# Patient Record
Sex: MALE | Race: WHITE | Employment: FULL TIME
[De-identification: names, ages, dates, MRNs, and addresses within clinical notes are randomized per-mention and may not be internally consistent; named-entity substitution may affect disease eponyms.]

---

## 2019-09-29 ENCOUNTER — HEALTH MAINTENANCE LETTER (OUTPATIENT)
Age: 59
End: 2019-09-29

## 2021-01-14 ENCOUNTER — HEALTH MAINTENANCE LETTER (OUTPATIENT)
Age: 61
End: 2021-01-14

## 2021-04-01 ENCOUNTER — TRANSFERRED RECORDS (OUTPATIENT)
Dept: HEALTH INFORMATION MANAGEMENT | Facility: CLINIC | Age: 61
End: 2021-04-01

## 2021-04-08 ENCOUNTER — OFFICE VISIT (OUTPATIENT)
Dept: FAMILY MEDICINE | Facility: CLINIC | Age: 61
End: 2021-04-08
Payer: COMMERCIAL

## 2021-04-08 ENCOUNTER — TELEPHONE (OUTPATIENT)
Dept: FAMILY MEDICINE | Facility: CLINIC | Age: 61
End: 2021-04-08

## 2021-04-08 VITALS
TEMPERATURE: 98.1 F | SYSTOLIC BLOOD PRESSURE: 131 MMHG | RESPIRATION RATE: 20 BRPM | WEIGHT: 285 LBS | HEART RATE: 60 BPM | HEIGHT: 75 IN | OXYGEN SATURATION: 96 % | BODY MASS INDEX: 35.43 KG/M2 | DIASTOLIC BLOOD PRESSURE: 83 MMHG

## 2021-04-08 DIAGNOSIS — I10 ESSENTIAL HYPERTENSION: ICD-10-CM

## 2021-04-08 DIAGNOSIS — E11.65 TYPE 2 DIABETES MELLITUS WITH HYPERGLYCEMIA, WITH LONG-TERM CURRENT USE OF INSULIN (H): ICD-10-CM

## 2021-04-08 DIAGNOSIS — Z79.4 TYPE 2 DIABETES MELLITUS WITH HYPERGLYCEMIA, WITH LONG-TERM CURRENT USE OF INSULIN (H): Primary | ICD-10-CM

## 2021-04-08 DIAGNOSIS — E78.5 HYPERLIPIDEMIA LDL GOAL <100: ICD-10-CM

## 2021-04-08 DIAGNOSIS — E11.65 TYPE 2 DIABETES MELLITUS WITH HYPERGLYCEMIA, WITH LONG-TERM CURRENT USE OF INSULIN (H): Primary | ICD-10-CM

## 2021-04-08 DIAGNOSIS — Z79.4 TYPE 2 DIABETES MELLITUS WITH HYPERGLYCEMIA, WITH LONG-TERM CURRENT USE OF INSULIN (H): ICD-10-CM

## 2021-04-08 DIAGNOSIS — R73.9 HYPERGLYCEMIA: ICD-10-CM

## 2021-04-08 LAB
ANION GAP SERPL CALCULATED.3IONS-SCNC: 6 MMOL/L (ref 3–14)
BUN SERPL-MCNC: 13 MG/DL (ref 7–30)
CALCIUM SERPL-MCNC: 8.6 MG/DL (ref 8.5–10.1)
CHLORIDE SERPL-SCNC: 105 MMOL/L (ref 94–109)
CHOLEST SERPL-MCNC: 313 MG/DL
CO2 SERPL-SCNC: 24 MMOL/L (ref 20–32)
CREAT SERPL-MCNC: 0.86 MG/DL (ref 0.66–1.25)
CREAT UR-MCNC: 300 MG/DL
GFR SERPL CREATININE-BSD FRML MDRD: >90 ML/MIN/{1.73_M2}
GLUCOSE SERPL-MCNC: 192 MG/DL (ref 70–99)
HBA1C MFR BLD: 11.7 % (ref 0–5.6)
HDLC SERPL-MCNC: 47 MG/DL
LDLC SERPL CALC-MCNC: 217 MG/DL
MICROALBUMIN UR-MCNC: 85 MG/L
MICROALBUMIN/CREAT UR: 28.3 MG/G CR (ref 0–17)
NONHDLC SERPL-MCNC: 266 MG/DL
POTASSIUM SERPL-SCNC: 3.8 MMOL/L (ref 3.4–5.3)
SODIUM SERPL-SCNC: 135 MMOL/L (ref 133–144)
TRIGL SERPL-MCNC: 247 MG/DL

## 2021-04-08 PROCEDURE — 80061 LIPID PANEL: CPT | Performed by: PHYSICIAN ASSISTANT

## 2021-04-08 PROCEDURE — 82043 UR ALBUMIN QUANTITATIVE: CPT | Performed by: PHYSICIAN ASSISTANT

## 2021-04-08 PROCEDURE — 99204 OFFICE O/P NEW MOD 45 MIN: CPT | Performed by: PHYSICIAN ASSISTANT

## 2021-04-08 PROCEDURE — 80048 BASIC METABOLIC PNL TOTAL CA: CPT | Performed by: PHYSICIAN ASSISTANT

## 2021-04-08 PROCEDURE — 36415 COLL VENOUS BLD VENIPUNCTURE: CPT | Performed by: PHYSICIAN ASSISTANT

## 2021-04-08 RX ORDER — FLURBIPROFEN SODIUM 0.3 MG/ML
SOLUTION/ DROPS OPHTHALMIC
Qty: 100 EACH | Refills: 3 | Status: SHIPPED | OUTPATIENT
Start: 2021-04-08 | End: 2021-04-08

## 2021-04-08 RX ORDER — LANCETS
EACH MISCELLANEOUS
Qty: 200 EACH | Refills: 11 | Status: SHIPPED | OUTPATIENT
Start: 2021-04-08 | End: 2021-04-08

## 2021-04-08 RX ORDER — METFORMIN HCL 500 MG
TABLET, EXTENDED RELEASE 24 HR ORAL
Qty: 360 TABLET | Refills: 0 | Status: SHIPPED | OUTPATIENT
Start: 2021-04-08 | End: 2021-04-08

## 2021-04-08 RX ORDER — FLURBIPROFEN SODIUM 0.3 MG/ML
SOLUTION/ DROPS OPHTHALMIC
Qty: 100 EACH | Refills: 3 | Status: SHIPPED | OUTPATIENT
Start: 2021-04-08 | End: 2021-05-10

## 2021-04-08 RX ORDER — GLUCOSAMINE HCL/CHONDROITIN SU 500-400 MG
CAPSULE ORAL
Qty: 100 EACH | Refills: 3 | Status: SHIPPED | OUTPATIENT
Start: 2021-04-08 | End: 2021-04-08

## 2021-04-08 RX ORDER — LANCETS
EACH MISCELLANEOUS
Qty: 200 EACH | Refills: 11 | Status: SHIPPED | OUTPATIENT
Start: 2021-04-08 | End: 2021-04-09

## 2021-04-08 RX ORDER — GLUCOSAMINE HCL/CHONDROITIN SU 500-400 MG
CAPSULE ORAL
Qty: 100 EACH | Refills: 3 | Status: SHIPPED | OUTPATIENT
Start: 2021-04-08

## 2021-04-08 RX ORDER — METFORMIN HCL 500 MG
TABLET, EXTENDED RELEASE 24 HR ORAL
Qty: 360 TABLET | Refills: 0 | Status: SHIPPED | OUTPATIENT
Start: 2021-04-08

## 2021-04-08 SDOH — ECONOMIC STABILITY: FOOD INSECURITY: WITHIN THE PAST 12 MONTHS, YOU WORRIED THAT YOUR FOOD WOULD RUN OUT BEFORE YOU GOT MONEY TO BUY MORE.: NOT ASKED

## 2021-04-08 SDOH — ECONOMIC STABILITY: TRANSPORTATION INSECURITY
IN THE PAST 12 MONTHS, HAS LACK OF TRANSPORTATION KEPT YOU FROM MEETINGS, WORK, OR FROM GETTING THINGS NEEDED FOR DAILY LIVING?: NOT ASKED

## 2021-04-08 SDOH — ECONOMIC STABILITY: FOOD INSECURITY: WITHIN THE PAST 12 MONTHS, THE FOOD YOU BOUGHT JUST DIDN'T LAST AND YOU DIDN'T HAVE MONEY TO GET MORE.: NOT ASKED

## 2021-04-08 SDOH — ECONOMIC STABILITY: TRANSPORTATION INSECURITY
IN THE PAST 12 MONTHS, HAS THE LACK OF TRANSPORTATION KEPT YOU FROM MEDICAL APPOINTMENTS OR FROM GETTING MEDICATIONS?: NOT ASKED

## 2021-04-08 SDOH — ECONOMIC STABILITY: INCOME INSECURITY: HOW HARD IS IT FOR YOU TO PAY FOR THE VERY BASICS LIKE FOOD, HOUSING, MEDICAL CARE, AND HEATING?: NOT ASKED

## 2021-04-08 ASSESSMENT — MIFFLIN-ST. JEOR: SCORE: 2183.38

## 2021-04-08 NOTE — Clinical Note
Neida. Having follow up with you in 1 month. New diabetic. Started on meds. Seeing education. See note. Let me know if you have any questions.     -jeremi gay, pac

## 2021-04-08 NOTE — PROGRESS NOTES
Assessment & Plan     Type 2 diabetes mellitus with hyperglycemia, with long-term current use of insulin (H)  Extremely uncontrolled. Discussed diagnosis and management in great detail. Given a1c, recommending not only starting metformin, but also insulin with future adjustments per diabetes education. Labs pending today and medications and supplies sent to pharmacy. Follow up in 1 week with diabetes education and with extender in 1 month. Recommending check on status of sugars at home and medications at this follow up. Will check a1c in 3 months.   - Albumin Random Urine Quantitative with Creat Ratio  - Lipid panel reflex to direct LDL Fasting  - Basic metabolic panel  (Ca, Cl, CO2, Creat, Gluc, K, Na, BUN)  - AMBULATORY ADULT DIABETES EDUCATOR REFERRAL; Future  - EYE ADULT REFERRAL; Future  - alcohol swab prep pads; Use to swab area of injection/idalia as directed.  - blood glucose (NO BRAND SPECIFIED) test strip; Use to test blood sugar 1-3 times daily or as directed. To accompany: Blood Glucose Monitor Brands: per insurance.  - blood glucose calibration (NO BRAND SPECIFIED) solution; To accompany: Blood Glucose Monitor Brands: per insurance.  - blood glucose monitoring (NO BRAND SPECIFIED) meter device kit; Use to test blood sugar 1-3 times daily or as directed. Preferred blood glucose meter OR supplies to accompany: Blood Glucose Monitor Brands: per insurance.  - insulin glargine (LANTUS SOLOSTAR) 100 UNIT/ML pen; Inject 10 Units Subcutaneous At Bedtime  - insulin pen needle (ULTICARE MINI) 31G X 6 MM miscellaneous; Use 1 pen needles daily or as directed.  - metFORMIN (GLUCOPHAGE-XR) 500 MG 24 hr tablet; Take 1 tab (500 mg) with dinner daily for 1 week. Then increase to 1 tab (500 mg) bid with meals for 1 week. Then 1 tab (500 mg) in AM with meal and 2 tabs (1000 mg) with dinner for 1 week. Then 2 tabs (1000 mg) bid with meals.  - thin (NO BRAND SPECIFIED) lancets; Use with lanceting device. To accompany:  "Blood Glucose Monitor Brands: per insurance.  -Medication use and side effects discussed with the patient. Patient is in complete understanding and agreement with plan.       Hyperglycemia  As above     Essential hypertension  History of this, but now controlled. Will continue to monitor and albumin pending. If elevated albumin and on repeat in 3 months, still elevated, will restart lisinopril.       45 minutes spent on the date of the encounter doing chart review, review of outside records, review of test results, patient visit and documentation      BMI:   Estimated body mass index is 35.62 kg/m  as calculated from the following:    Height as of this encounter: 1.905 m (6' 3\").    Weight as of this encounter: 129.3 kg (285 lb).   Weight management plan: Discussed healthy diet and exercise guidelines      Return in about 1 month (around 5/8/2021) for diabetes extender in person preferred. labs to lab for urine. .    M[facility] MYA Badillo is a 61 year old who presents for the following health issues    History of Present Illness       Diabetes:   He presents for follow up of diabetes.  He is not checking blood glucose. He is concerned about other.  He is not experiencing numbness or burning in feet, excessive thirst, blurry vision, weight changes or redness, sores or blisters on feet. The patient has not had a diabetic eye exam in the last 12 months.         Hypertension: He presents for follow up of hypertension.  He does not check blood pressure  regularly outside of the clinic. Outside blood pressures have been over 140/90. He follows a low salt diet.     He eats 2-3 servings of fruits and vegetables daily.He consumes 0 sweetened beverage(s) daily.He exercises with enough effort to increase his heart rate 30 to 60 minutes per day.  He exercises with enough effort to increase his heart rate 6 days per week.   He is taking medications regularly.   " "  In summary, patient is a 61 yoM with a past medical history of type 2 diabetes (new onset in 2014) and htn who has not been seen in ~6 years and no longer on any medications. Never took medication for diabetes and patient states was not aware was diabetic originally. No longer taking lisinopril. States has focused on weight loss and diet and no longer drinks pop.     Was at [facility] health clinic on 4/1 where his BG was noted to be 221 with BP of 144/96 and a1c of 11.7. patient denies any symptoms of vision changes, numbness or tingling, thirst, urination changes, or unintentional weight loss.          Review of Systems   Constitutional, HEENT, cardiovascular, pulmonary, GI, , musculoskeletal, neuro, skin, endocrine and psych systems are negative, except as otherwise noted.      Objective    /83 (BP Location: Right arm, Patient Position: Chair, Cuff Size: Adult Large)   Pulse 60   Temp 98.1  F (36.7  C) (Oral)   Resp 20   Ht 1.905 m (6' 3\")   Wt 129.3 kg (285 lb)   SpO2 96%   BMI 35.62 kg/m    Body mass index is 35.62 kg/m .  Physical Exam   GENERAL: alert, no distress and obese  RESP: lungs clear to auscultation - no rales, rhonchi or wheezes  CV: regular rates and rhythm, normal S1 S2, no S3 or S4 and no murmur, click or rub  Diabetic foot exam: normal DP and PT pulses, no trophic changes or ulcerative lesions, normal sensory exam and normal monofilament exam            "

## 2021-04-08 NOTE — PATIENT INSTRUCTIONS
"  Patient Education   Diabetes ABCs  Work with Your Health Care Team to Manage Diabetes!     A1c (hemoglobin A1c test):  Check at least every 6 months.  Goal without diabetes: Less than 6%  Goal with diabetes: Less than 7%, but your doctor may have a different goal for you.  My Goal: ___________________   BG (blood glucose):  Goals without diabetes:  Before meals: 60 to 99 mg/dL  After meals (2 hours): under 140 mg/dL  Goals with diabetes:   Before meals: 80 to 130 mg/dL  After meals: (2 hours): under 180 mg/dL  My Goals:  Before meals: __________  After meals: ___________   Blood pressure:  Check at every doctor visit.   Goal: Less than 140/90    Cholesterol:   Check at least 1 time a year.  Goals for LDL (\"bad\" cholesterol):  With heart disease: Less than 70 mg/dL  Without heart disease: Less than 100 mg/dL   Eyes:   Have a dilated eye exam every year.   Teeth:   See your dentist twice a year. People with diabetes have a higher risk of gum disease.  Smoking:   If you smoke, it's important to quit. Make a plan with help from your health care team.  Weight:   Work towards a healthy weight with help from your diabetes educator or dietitian.  Kidneys:     Check your urine protein 1 time each year.    Protect your kidneys by keeping your blood pressure normal.  Feet:    Check your feet every day for signs of injury, dry skin, cracks, cuts, swelling, or blisters.    Ask your doctor to check your feet at every visit.    Wear shoes and socks that fit well.    Don't go barefoot.  Sex:   Talk about erectile dysfunction (ED) and female sexual dysfunction (FSD) with your doctor.  For informational purposes only. Not to replace the advice of your health care provider. Copyright   2018 [employer]. All rights reserved. Illustration ID [id number]   [id number]  TOOVIA. Clinically reviewed by [facility]. SMART[employer] [id number] - 10/18.       "

## 2021-04-08 NOTE — TELEPHONE ENCOUNTER
Pharmacy Comments    thin (NO BRAND SPECIFIED) lancets    How many times per day is the patient testing? Need that information on the Rx.

## 2021-04-09 ENCOUNTER — TELEPHONE (OUTPATIENT)
Dept: FAMILY MEDICINE | Facility: CLINIC | Age: 61
End: 2021-04-09

## 2021-04-09 RX ORDER — ATORVASTATIN CALCIUM 80 MG/1
80 TABLET, FILM COATED ORAL DAILY
Qty: 90 TABLET | Refills: 3 | Status: SHIPPED | OUTPATIENT
Start: 2021-04-09

## 2021-04-09 RX ORDER — LANCETS
EACH MISCELLANEOUS
Qty: 200 EACH | Refills: 11 | Status: SHIPPED | OUTPATIENT
Start: 2021-04-09

## 2021-04-09 NOTE — TELEPHONE ENCOUNTER
Patient called back and stated he will call back to schedule. He just started a new job.    Priya PENA  Central Scheduler

## 2021-04-09 NOTE — TELEPHONE ENCOUNTER
Diabetes Education Scheduling Outreach #1:    Call to patient to schedule. Left message with phone number to call to schedule.    Plan for 2nd outreach attempt within 1 week.    Priya Pemberton  [place] [employer]  Diabetes and Nutrition Scheduling

## 2021-04-09 NOTE — TELEPHONE ENCOUNTER
Lancets Sig corrected. Prescription approved per [facility] Refill Protocol.  Taiwo Sainz RN

## 2021-05-10 ENCOUNTER — OFFICE VISIT (OUTPATIENT)
Dept: FAMILY MEDICINE | Facility: CLINIC | Age: 61
End: 2021-05-10
Payer: COMMERCIAL

## 2021-05-10 VITALS
BODY MASS INDEX: 35.5 KG/M2 | TEMPERATURE: 97.6 F | HEART RATE: 73 BPM | RESPIRATION RATE: 14 BRPM | DIASTOLIC BLOOD PRESSURE: 80 MMHG | WEIGHT: 284 LBS | SYSTOLIC BLOOD PRESSURE: 132 MMHG

## 2021-05-10 DIAGNOSIS — E11.65 TYPE 2 DIABETES MELLITUS WITH HYPERGLYCEMIA, WITH LONG-TERM CURRENT USE OF INSULIN (H): Primary | ICD-10-CM

## 2021-05-10 DIAGNOSIS — Z79.4 TYPE 2 DIABETES MELLITUS WITH HYPERGLYCEMIA, WITH LONG-TERM CURRENT USE OF INSULIN (H): Primary | ICD-10-CM

## 2021-05-10 LAB
ALBUMIN UR-MCNC: 30 MG/DL
AMORPH CRY #/AREA URNS HPF: ABNORMAL /HPF
APPEARANCE UR: CLEAR
BACTERIA #/AREA URNS HPF: ABNORMAL /HPF
BILIRUB UR QL STRIP: NEGATIVE
COLOR UR AUTO: ABNORMAL
CREAT SERPL-MCNC: 0.89 MG/DL (ref 0.66–1.25)
GFR SERPL CREATININE-BSD FRML MDRD: >90 ML/MIN/{1.73_M2}
GLUCOSE SERPL-MCNC: 137 MG/DL (ref 70–99)
GLUCOSE UR STRIP-MCNC: NEGATIVE MG/DL
HGB UR QL STRIP: NEGATIVE
KETONES UR STRIP-MCNC: NEGATIVE MG/DL
LEUKOCYTE ESTERASE UR QL STRIP: NEGATIVE
MUCOUS THREADS #/AREA URNS LPF: PRESENT /LPF
NITRATE UR QL: NEGATIVE
NON-SQ EPI CELLS #/AREA URNS LPF: ABNORMAL /LPF
PH UR STRIP: 5 PH (ref 5–7)
RBC #/AREA URNS AUTO: ABNORMAL /HPF
SOURCE: ABNORMAL
SP GR UR STRIP: >1.03 (ref 1–1.03)
TRANS CELLS #/AREA URNS HPF: ABNORMAL /HPF
UROBILINOGEN UR STRIP-ACNC: 0.2 EU/DL (ref 0.2–1)
WBC #/AREA URNS AUTO: ABNORMAL /HPF

## 2021-05-10 PROCEDURE — 81001 URINALYSIS AUTO W/SCOPE: CPT | Performed by: PHYSICIAN ASSISTANT

## 2021-05-10 PROCEDURE — 36415 COLL VENOUS BLD VENIPUNCTURE: CPT | Performed by: PHYSICIAN ASSISTANT

## 2021-05-10 PROCEDURE — 82565 ASSAY OF CREATININE: CPT | Performed by: PHYSICIAN ASSISTANT

## 2021-05-10 PROCEDURE — 99213 OFFICE O/P EST LOW 20 MIN: CPT | Performed by: PHYSICIAN ASSISTANT

## 2021-05-10 PROCEDURE — 82947 ASSAY GLUCOSE BLOOD QUANT: CPT | Performed by: PHYSICIAN ASSISTANT

## 2021-05-10 RX ORDER — BLOOD-GLUCOSE CONTROL, NORMAL
EACH MISCELLANEOUS
COMMUNITY
Start: 2021-04-08

## 2021-05-10 RX ORDER — BLOOD-GLUCOSE METER
EACH MISCELLANEOUS
COMMUNITY
Start: 2021-04-08

## 2021-05-10 NOTE — Clinical Note
Fyi. Saw patient today. Noncompliant with treatment plan. Never saw diabetes education and never started insulin. However, claims drastic improved home sugars with metformin and life style changes only. Checking labs today. Pending labs, will likely recheck a1c and other future labs in 2 months. Can be virtual visit with lab only. Denied scheduling today. Starting new job tomorrow and will need to look as schedule.     -jeremi gay, pac

## 2021-05-10 NOTE — PROGRESS NOTES
Assessment & Plan     Type 2 diabetes mellitus with hyperglycemia, with long-term current use of insulin (H)  Patient reports drastic improvement in sugars at home with life style changes. Never started lantus as recommended. However, if what he says is true, likely will not need this. Discussed diabetes code as well. Will recheck glucose today. Patient is fasting. Also renal function and UA for evidence of glucose and ketones. Curiously, has lost no weight though making drastic reported life style changes. However, it is possible previous weight was atypical due to unintentional weight loss from diabetes. Pending labs, planning on rechecking a1c in 2 months with microalbumin and lipids. May be lab only and virtual visit for this.   - Glucose  - Creatinine  - *UA reflex to Microscopic and Culture ([place] [facility] (except [place] and [place])    29 minutes spent on the date of the encounter doing chart review, patient visit and documentation       Return in about 2 months (around 7/10/2021) for diabetes check virtual with prior lab only. .    Dm Cintron PA-C  [facility] MYA Badillo is a 61 year old who presents for the following health issues     HPI     Diabetes Follow-up      How often are you checking your blood sugar? Daily before and after breakfast    What concerns do you have today about your diabetes? None     Do you have any of these symptoms? (Select all that apply)  No numbness or tingling in feet.  No redness, sores or blisters on feet.  No complaints of excessive thirst.  No reports of blurry vision.  No significant changes to weight.    Have you had a diabetic eye exam in the last 12 months? No      BP Readings from Last 2 Encounters:   05/10/21 132/80   04/08/21 131/83     Hemoglobin A1C (%)   Date Value   04/01/2021 11.7 (A)   11/12/2013 6.6 (H)     LDL Cholesterol Calculated (mg/dL)   Date Value   04/08/2021 217 (H)   06/06/2014 115        Stopped all sugars including pop daily. Also has increased exercise. States fasting sugars are mid to upper 120s. Never started lantus. On 1000 mg of metformin bid currently. No side effects. Never followed up with diabetes education.     Hypertension Follow-up      Do you check your blood pressure regularly outside of the clinic? No     Are you following a low salt diet? Yes    Are your blood pressures ever more than 140 on the top number (systolic) OR more   than 90 on the bottom number (diastolic), for example 140/90? No    Review of Systems   Constitutional, HEENT, cardiovascular, pulmonary, GI, , musculoskeletal, neuro, skin, endocrine and psych systems are negative, except as otherwise noted.      Objective    /80 (BP Location: Right arm, Patient Position: Chair, Cuff Size: Adult Large)   Pulse 73   Temp 97.6  F (36.4  C) (Oral)   Resp 14   Wt 128.8 kg (284 lb)   BMI 35.50 kg/m    Body mass index is 35.5 kg/m .  Physical Exam   GENERAL: alert, no distress and obese  RESP: lungs clear to auscultation - no rales, rhonchi or wheezes  CV: regular rates and rhythm, normal S1 S2, no S3 or S4 and no murmur, click or rub  PSYCH: mentation appears normal, affect normal/bright

## 2021-05-11 ENCOUNTER — PATIENT OUTREACH (OUTPATIENT)
Dept: FAMILY MEDICINE | Facility: CLINIC | Age: 61
End: 2021-05-11

## 2021-05-11 NOTE — TELEPHONE ENCOUNTER
Pt called when PAL on another call. Did not leave message.   Left message call back Thur or Fri after 7 AM. Or call back this evening before 6 PM.   Taiwo Sainz RN

## 2021-05-11 NOTE — TELEPHONE ENCOUNTER
Left message to all back. FYI started new job today. Offer video visit and lab only in 2 months (or OV if patient prefers). Lipids, albumin, A1C  At this time has not yet reviewed lab results in MyChart:   Kidney function remains normal.    You had a little protein in the urine. This is likely the residual albumin which should improve with improved diabetes.We can recheck this in 2 months.  Blood sugar is much improved. Let's stick to our plan and recheck your diabetes in 2 months.   Taiwo Sainz RN - Patient Advocate and Liaison (PAL)  [facility]   [phone]

## 2021-05-13 NOTE — TELEPHONE ENCOUNTER
Phone call with Justino this morning. Lab results reviewed. He prefers to schedule an in person visit mid-July at a later date.  Taiwo Sainz RN - Patient Advocate and Liaison (PAL)  [facility]   [phone]

## 2021-05-21 ENCOUNTER — TELEPHONE (OUTPATIENT)
Dept: FAMILY MEDICINE | Facility: CLINIC | Age: 61
End: 2021-05-21

## 2021-05-21 NOTE — TELEPHONE ENCOUNTER
Summary:    Patient is due/failing the following:   Eye exam    Reviewed:  [x] CARE EVERYWHERE  [x] LAST OV NOTE INCLUDING ENDO  [x] FYI TAB  [x] MYCHART ACTIVE?  [x] LAST PANEL ENCOUNTER  [x] FUTURE APPTS  [x] IMMUNIZATIONS          Action needed:   Patient needs office visit for eye exam.    Type of outreach:    Phone, left message for patient to call back.  and Sent MyChart message.                                                                               Lyndsey Bolden/[facility]---[facility]

## 2021-07-15 ENCOUNTER — PATIENT OUTREACH (OUTPATIENT)
Dept: FAMILY MEDICINE | Facility: CLINIC | Age: 61
End: 2021-07-15

## 2021-07-15 NOTE — TELEPHONE ENCOUNTER
Due for visit mid-July. [facility] call [facility] or PAL line.   Taiwo Sainz RN - Patient Advocate and Liaison (PAL)  [facility] [phone]  PAL direct [phone]

## 2021-08-16 ENCOUNTER — PATIENT OUTREACH (OUTPATIENT)
Dept: FAMILY MEDICINE | Facility: CLINIC | Age: 61
End: 2021-08-16

## 2021-08-16 NOTE — LETTER
August 16, 2021      Justino Giordano  [address] [address] [address]        Dear Justino,     This is a reminder to schedule a fasting lab appointment followed by a virtual (video) visit with Tom a few days later to review the results.     Please call [phone] if you have questions about this reminder and/or for assistance scheduling the appointments.      Do not eat 10-12 hours before your lab appointment but do drink plenty of water during the fast. Tea and black coffee (with nothing added) are okay, too.     Taiwo Regalado RN - PAL (Patient Advocate and Liaison)  [facility]  [phone]

## 2021-08-16 NOTE — TELEPHONE ENCOUNTER
Was due around 7/10/2021 for diabetes check virtual with prior lab only.  MyChart and phone reminders not answered.   Letter mailed.   Taiwo Sainz RN - Patient Advocate and Liaison (PAL)  [facility]   [phone]

## 2021-10-24 ENCOUNTER — HEALTH MAINTENANCE LETTER (OUTPATIENT)
Age: 61
End: 2021-10-24

## 2022-02-13 ENCOUNTER — HEALTH MAINTENANCE LETTER (OUTPATIENT)
Age: 62
End: 2022-02-13

## 2022-06-05 ENCOUNTER — HEALTH MAINTENANCE LETTER (OUTPATIENT)
Age: 62
End: 2022-06-05

## 2022-10-15 ENCOUNTER — HEALTH MAINTENANCE LETTER (OUTPATIENT)
Age: 62
End: 2022-10-15

## 2023-03-26 ENCOUNTER — HEALTH MAINTENANCE LETTER (OUTPATIENT)
Age: 63
End: 2023-03-26

## 2023-10-29 ENCOUNTER — HEALTH MAINTENANCE LETTER (OUTPATIENT)
Age: 63
End: 2023-10-29

## 2024-05-26 ENCOUNTER — HEALTH MAINTENANCE LETTER (OUTPATIENT)
Age: 64
End: 2024-05-26

## 2024-12-21 ENCOUNTER — HEALTH MAINTENANCE LETTER (OUTPATIENT)
Age: 64
End: 2024-12-21

## 2025-04-07 ENCOUNTER — HOSPITAL ENCOUNTER (INPATIENT)
Facility: CLINIC | Age: 65
End: 2025-04-07
Attending: EMERGENCY MEDICINE
Payer: MEDICARE

## 2025-04-07 ENCOUNTER — APPOINTMENT (OUTPATIENT)
Dept: GENERAL RADIOLOGY | Facility: CLINIC | Age: 65
End: 2025-04-07
Attending: EMERGENCY MEDICINE
Payer: MEDICARE

## 2025-04-07 ENCOUNTER — APPOINTMENT (OUTPATIENT)
Dept: MRI IMAGING | Facility: CLINIC | Age: 65
DRG: 853 | End: 2025-04-07
Attending: PODIATRIST
Payer: MEDICARE

## 2025-04-07 DIAGNOSIS — E78.5 HYPERLIPIDEMIA LDL GOAL <100: ICD-10-CM

## 2025-04-07 DIAGNOSIS — A48.0 GAS GANGRENE OF FOOT (H): Primary | ICD-10-CM

## 2025-04-07 DIAGNOSIS — Z89.422 HISTORY OF COMPLETE RAY AMPUTATION OF FIFTH TOE OF LEFT FOOT: ICD-10-CM

## 2025-04-07 DIAGNOSIS — S91.309A WOUND OF FOOT: ICD-10-CM

## 2025-04-07 DIAGNOSIS — E11.9 TYPE 2 DIABETES MELLITUS WITHOUT COMPLICATION, WITHOUT LONG-TERM CURRENT USE OF INSULIN (H): ICD-10-CM

## 2025-04-07 DIAGNOSIS — E11.9 TYPE 2 DIABETES MELLITUS WITHOUT COMPLICATION, UNSPECIFIED WHETHER LONG TERM INSULIN USE (H): ICD-10-CM

## 2025-04-07 DIAGNOSIS — I10 ESSENTIAL HYPERTENSION, BENIGN: ICD-10-CM

## 2025-04-07 DIAGNOSIS — Z79.4 TYPE 2 DIABETES MELLITUS WITHOUT COMPLICATION, WITH LONG-TERM CURRENT USE OF INSULIN (H): ICD-10-CM

## 2025-04-07 DIAGNOSIS — I48.92 ATRIAL FLUTTER, PAROXYSMAL (H): ICD-10-CM

## 2025-04-07 DIAGNOSIS — E11.9 TYPE 2 DIABETES MELLITUS WITHOUT COMPLICATION, WITH LONG-TERM CURRENT USE OF INSULIN (H): ICD-10-CM

## 2025-04-07 LAB
ANION GAP SERPL CALCULATED.3IONS-SCNC: 19 MMOL/L (ref 7–15)
BASOPHILS # BLD AUTO: 0.1 10E3/UL (ref 0–0.2)
BASOPHILS NFR BLD AUTO: 0 %
BUN SERPL-MCNC: 12.9 MG/DL (ref 8–23)
CALCIUM SERPL-MCNC: 9.1 MG/DL (ref 8.8–10.4)
CHLORIDE SERPL-SCNC: 88 MMOL/L (ref 98–107)
CHOLEST SERPL-MCNC: 207 MG/DL
CREAT SERPL-MCNC: 0.79 MG/DL (ref 0.67–1.17)
CRP SERPL-MCNC: 380.9 MG/L
EGFRCR SERPLBLD CKD-EPI 2021: >90 ML/MIN/1.73M2
EOSINOPHIL # BLD AUTO: 0 10E3/UL (ref 0–0.7)
EOSINOPHIL NFR BLD AUTO: 0 %
ERYTHROCYTE [DISTWIDTH] IN BLOOD BY AUTOMATED COUNT: 11.7 % (ref 10–15)
ERYTHROCYTE [SEDIMENTATION RATE] IN BLOOD BY WESTERGREN METHOD: 46 MM/HR (ref 0–20)
EST. AVERAGE GLUCOSE BLD GHB EST-MCNC: 306 MG/DL
GLUCOSE BLDC GLUCOMTR-MCNC: 232 MG/DL (ref 70–99)
GLUCOSE BLDC GLUCOMTR-MCNC: 239 MG/DL (ref 70–99)
GLUCOSE BLDC GLUCOMTR-MCNC: 240 MG/DL (ref 70–99)
GLUCOSE BLDC GLUCOMTR-MCNC: 256 MG/DL (ref 70–99)
GLUCOSE SERPL-MCNC: 282 MG/DL (ref 70–99)
HBA1C MFR BLD: 12.3 %
HCO3 SERPL-SCNC: 20 MMOL/L (ref 22–29)
HCT VFR BLD AUTO: 40.5 % (ref 40–53)
HDLC SERPL-MCNC: 41 MG/DL
HGB BLD-MCNC: 14.5 G/DL (ref 13.3–17.7)
HOLD SPECIMEN: NORMAL
IMM GRANULOCYTES # BLD: 0.3 10E3/UL
IMM GRANULOCYTES NFR BLD: 2 %
LACTATE SERPL-SCNC: 1.9 MMOL/L (ref 0.7–2)
LDLC SERPL CALC-MCNC: 146 MG/DL
LYMPHOCYTES # BLD AUTO: 1.5 10E3/UL (ref 0.8–5.3)
LYMPHOCYTES NFR BLD AUTO: 9 %
MAGNESIUM SERPL-MCNC: 1.9 MG/DL (ref 1.7–2.3)
MCH RBC QN AUTO: 30.7 PG (ref 26.5–33)
MCHC RBC AUTO-ENTMCNC: 35.8 G/DL (ref 31.5–36.5)
MCV RBC AUTO: 86 FL (ref 78–100)
MONOCYTES # BLD AUTO: 1.6 10E3/UL (ref 0–1.3)
MONOCYTES NFR BLD AUTO: 10 %
MRSA DNA SPEC QL NAA+PROBE: NEGATIVE
NEUTROPHILS # BLD AUTO: 12.9 10E3/UL (ref 1.6–8.3)
NEUTROPHILS NFR BLD AUTO: 79 %
NONHDLC SERPL-MCNC: 166 MG/DL
NRBC # BLD AUTO: 0 10E3/UL
NRBC BLD AUTO-RTO: 0 /100
PHOSPHATE SERPL-MCNC: 3.1 MG/DL (ref 2.5–4.5)
PLAT MORPH BLD: NORMAL
PLATELET # BLD AUTO: 340 10E3/UL (ref 150–450)
POTASSIUM SERPL-SCNC: 3.7 MMOL/L (ref 3.4–5.3)
RADIOLOGIST FLAGS: NORMAL
RBC # BLD AUTO: 4.73 10E6/UL (ref 4.4–5.9)
RBC MORPH BLD: NORMAL
SA TARGET DNA: NEGATIVE
SODIUM SERPL-SCNC: 127 MMOL/L (ref 135–145)
TRIGL SERPL-MCNC: 102 MG/DL
WBC # BLD AUTO: 16.4 10E3/UL (ref 4–11)

## 2025-04-07 PROCEDURE — 36415 COLL VENOUS BLD VENIPUNCTURE: CPT | Performed by: EMERGENCY MEDICINE

## 2025-04-07 PROCEDURE — 86140 C-REACTIVE PROTEIN: CPT | Performed by: EMERGENCY MEDICINE

## 2025-04-07 PROCEDURE — 258N000003 HC RX IP 258 OP 636: Performed by: EMERGENCY MEDICINE

## 2025-04-07 PROCEDURE — 250N000013 HC RX MED GY IP 250 OP 250 PS 637: Performed by: INTERNAL MEDICINE

## 2025-04-07 PROCEDURE — 99285 EMERGENCY DEPT VISIT HI MDM: CPT | Mod: 25

## 2025-04-07 PROCEDURE — 83735 ASSAY OF MAGNESIUM: CPT | Performed by: PHYSICIAN ASSISTANT

## 2025-04-07 PROCEDURE — 82962 GLUCOSE BLOOD TEST: CPT

## 2025-04-07 PROCEDURE — 73630 X-RAY EXAM OF FOOT: CPT | Mod: LT

## 2025-04-07 PROCEDURE — 73718 MRI LOWER EXTREMITY W/O DYE: CPT | Mod: LT

## 2025-04-07 PROCEDURE — 87070 CULTURE OTHR SPECIMN AEROBIC: CPT | Performed by: PHYSICIAN ASSISTANT

## 2025-04-07 PROCEDURE — 250N000012 HC RX MED GY IP 250 OP 636 PS 637: Performed by: PHYSICIAN ASSISTANT

## 2025-04-07 PROCEDURE — 83036 HEMOGLOBIN GLYCOSYLATED A1C: CPT | Performed by: EMERGENCY MEDICINE

## 2025-04-07 PROCEDURE — 96368 THER/DIAG CONCURRENT INF: CPT

## 2025-04-07 PROCEDURE — 99223 1ST HOSP IP/OBS HIGH 75: CPT | Performed by: PHYSICIAN ASSISTANT

## 2025-04-07 PROCEDURE — 87040 BLOOD CULTURE FOR BACTERIA: CPT | Performed by: EMERGENCY MEDICINE

## 2025-04-07 PROCEDURE — 96365 THER/PROPH/DIAG IV INF INIT: CPT

## 2025-04-07 PROCEDURE — 80048 BASIC METABOLIC PNL TOTAL CA: CPT | Performed by: EMERGENCY MEDICINE

## 2025-04-07 PROCEDURE — 84100 ASSAY OF PHOSPHORUS: CPT | Performed by: PHYSICIAN ASSISTANT

## 2025-04-07 PROCEDURE — 250N000011 HC RX IP 250 OP 636: Performed by: PHYSICIAN ASSISTANT

## 2025-04-07 PROCEDURE — 99222 1ST HOSP IP/OBS MODERATE 55: CPT | Performed by: PODIATRIST

## 2025-04-07 PROCEDURE — 87640 STAPH A DNA AMP PROBE: CPT | Performed by: PHYSICIAN ASSISTANT

## 2025-04-07 PROCEDURE — 258N000003 HC RX IP 258 OP 636: Performed by: PHYSICIAN ASSISTANT

## 2025-04-07 PROCEDURE — 85652 RBC SED RATE AUTOMATED: CPT | Performed by: EMERGENCY MEDICINE

## 2025-04-07 PROCEDURE — 87075 CULTR BACTERIA EXCEPT BLOOD: CPT | Performed by: PHYSICIAN ASSISTANT

## 2025-04-07 PROCEDURE — 85004 AUTOMATED DIFF WBC COUNT: CPT | Performed by: EMERGENCY MEDICINE

## 2025-04-07 PROCEDURE — 120N000001 HC R&B MED SURG/OB

## 2025-04-07 PROCEDURE — 73718 MRI LOWER EXTREMITY W/O DYE: CPT | Mod: 26 | Performed by: RADIOLOGY

## 2025-04-07 PROCEDURE — 83605 ASSAY OF LACTIC ACID: CPT | Performed by: EMERGENCY MEDICINE

## 2025-04-07 PROCEDURE — 250N000011 HC RX IP 250 OP 636: Performed by: EMERGENCY MEDICINE

## 2025-04-07 PROCEDURE — 82465 ASSAY BLD/SERUM CHOLESTEROL: CPT | Performed by: PHYSICIAN ASSISTANT

## 2025-04-07 RX ORDER — PIPERACILLIN SODIUM, TAZOBACTAM SODIUM 4; .5 G/20ML; G/20ML
4.5 INJECTION, POWDER, LYOPHILIZED, FOR SOLUTION INTRAVENOUS ONCE
Status: COMPLETED | OUTPATIENT
Start: 2025-04-07 | End: 2025-04-07

## 2025-04-07 RX ORDER — HYDRALAZINE HYDROCHLORIDE 20 MG/ML
5 INJECTION INTRAMUSCULAR; INTRAVENOUS EVERY 6 HOURS PRN
Status: DISCONTINUED | OUTPATIENT
Start: 2025-04-07 | End: 2025-04-19 | Stop reason: HOSPADM

## 2025-04-07 RX ORDER — AMOXICILLIN 250 MG
1 CAPSULE ORAL 2 TIMES DAILY PRN
Status: DISCONTINUED | OUTPATIENT
Start: 2025-04-07 | End: 2025-04-11

## 2025-04-07 RX ORDER — ONDANSETRON 4 MG/1
4 TABLET, ORALLY DISINTEGRATING ORAL EVERY 6 HOURS PRN
Status: DISCONTINUED | OUTPATIENT
Start: 2025-04-07 | End: 2025-04-19 | Stop reason: HOSPADM

## 2025-04-07 RX ORDER — LIDOCAINE 40 MG/G
CREAM TOPICAL
Status: DISCONTINUED | OUTPATIENT
Start: 2025-04-07 | End: 2025-04-17

## 2025-04-07 RX ORDER — CLINDAMYCIN PHOSPHATE 900 MG/50ML
900 INJECTION, SOLUTION INTRAVENOUS EVERY 8 HOURS
Status: DISCONTINUED | OUTPATIENT
Start: 2025-04-07 | End: 2025-04-19 | Stop reason: HOSPADM

## 2025-04-07 RX ORDER — OXYCODONE HYDROCHLORIDE 5 MG/1
5 TABLET ORAL EVERY 4 HOURS PRN
Status: DISCONTINUED | OUTPATIENT
Start: 2025-04-07 | End: 2025-04-17

## 2025-04-07 RX ORDER — ACETAMINOPHEN 325 MG/1
650 TABLET ORAL EVERY 4 HOURS PRN
Status: DISCONTINUED | OUTPATIENT
Start: 2025-04-07 | End: 2025-04-13

## 2025-04-07 RX ORDER — CLINDAMYCIN PHOSPHATE 900 MG/50ML
900 INJECTION, SOLUTION INTRAVENOUS ONCE
Status: COMPLETED | OUTPATIENT
Start: 2025-04-07 | End: 2025-04-07

## 2025-04-07 RX ORDER — ONDANSETRON 2 MG/ML
4 INJECTION INTRAMUSCULAR; INTRAVENOUS EVERY 6 HOURS PRN
Status: DISCONTINUED | OUTPATIENT
Start: 2025-04-07 | End: 2025-04-19 | Stop reason: HOSPADM

## 2025-04-07 RX ORDER — NICOTINE POLACRILEX 4 MG
15-30 LOZENGE BUCCAL
Status: DISCONTINUED | OUTPATIENT
Start: 2025-04-07 | End: 2025-04-19 | Stop reason: HOSPADM

## 2025-04-07 RX ORDER — SODIUM CHLORIDE 9 MG/ML
INJECTION, SOLUTION INTRAVENOUS CONTINUOUS
Status: DISCONTINUED | OUTPATIENT
Start: 2025-04-07 | End: 2025-04-08

## 2025-04-07 RX ORDER — MAGNESIUM OXIDE 400 MG/1
400 TABLET ORAL EVERY 4 HOURS
Status: COMPLETED | OUTPATIENT
Start: 2025-04-07 | End: 2025-04-07

## 2025-04-07 RX ORDER — AMOXICILLIN 250 MG
2 CAPSULE ORAL 2 TIMES DAILY PRN
Status: DISCONTINUED | OUTPATIENT
Start: 2025-04-07 | End: 2025-04-11

## 2025-04-07 RX ORDER — POTASSIUM CHLORIDE 1500 MG/1
20 TABLET, EXTENDED RELEASE ORAL ONCE
Status: COMPLETED | OUTPATIENT
Start: 2025-04-07 | End: 2025-04-07

## 2025-04-07 RX ORDER — PIPERACILLIN SODIUM, TAZOBACTAM SODIUM 4; .5 G/20ML; G/20ML
4.5 INJECTION, POWDER, LYOPHILIZED, FOR SOLUTION INTRAVENOUS EVERY 6 HOURS
Status: DISCONTINUED | OUTPATIENT
Start: 2025-04-07 | End: 2025-04-11

## 2025-04-07 RX ORDER — HYDROMORPHONE HCL IN WATER/PF 6 MG/30 ML
0.2 PATIENT CONTROLLED ANALGESIA SYRINGE INTRAVENOUS
Status: DISCONTINUED | OUTPATIENT
Start: 2025-04-07 | End: 2025-04-19 | Stop reason: HOSPADM

## 2025-04-07 RX ORDER — HYDROMORPHONE HCL IN WATER/PF 6 MG/30 ML
0.4 PATIENT CONTROLLED ANALGESIA SYRINGE INTRAVENOUS
Status: DISCONTINUED | OUTPATIENT
Start: 2025-04-07 | End: 2025-04-19 | Stop reason: HOSPADM

## 2025-04-07 RX ORDER — DEXTROSE MONOHYDRATE 25 G/50ML
25-50 INJECTION, SOLUTION INTRAVENOUS
Status: DISCONTINUED | OUTPATIENT
Start: 2025-04-07 | End: 2025-04-19 | Stop reason: HOSPADM

## 2025-04-07 RX ADMIN — SODIUM CHLORIDE, PRESERVATIVE FREE: 5 INJECTION INTRAVENOUS at 15:30

## 2025-04-07 RX ADMIN — POTASSIUM CHLORIDE 20 MEQ: 1500 TABLET, EXTENDED RELEASE ORAL at 15:30

## 2025-04-07 RX ADMIN — VANCOMYCIN HYDROCHLORIDE 2500 MG: 5 INJECTION, POWDER, LYOPHILIZED, FOR SOLUTION INTRAVENOUS at 09:51

## 2025-04-07 RX ADMIN — Medication 400 MG: at 19:16

## 2025-04-07 RX ADMIN — INSULIN ASPART 2 UNITS: 100 INJECTION, SOLUTION INTRAVENOUS; SUBCUTANEOUS at 16:48

## 2025-04-07 RX ADMIN — SODIUM CHLORIDE 1000 ML: 9 INJECTION, SOLUTION INTRAVENOUS at 13:14

## 2025-04-07 RX ADMIN — Medication 400 MG: at 15:30

## 2025-04-07 RX ADMIN — INSULIN GLARGINE 10 UNITS: 100 INJECTION, SOLUTION SUBCUTANEOUS at 17:29

## 2025-04-07 RX ADMIN — PIPERACILLIN AND TAZOBACTAM 4.5 G: 4; .5 INJECTION, POWDER, FOR SOLUTION INTRAVENOUS at 21:53

## 2025-04-07 RX ADMIN — PIPERACILLIN AND TAZOBACTAM 4.5 G: 4; .5 INJECTION, POWDER, FOR SOLUTION INTRAVENOUS at 09:43

## 2025-04-07 RX ADMIN — PIPERACILLIN AND TAZOBACTAM 4.5 G: 4; .5 INJECTION, POWDER, FOR SOLUTION INTRAVENOUS at 15:30

## 2025-04-07 RX ADMIN — CLINDAMYCIN PHOSPHATE 900 MG: 900 INJECTION, SOLUTION INTRAVENOUS at 09:42

## 2025-04-07 RX ADMIN — Medication 1500 MG: at 21:53

## 2025-04-07 RX ADMIN — CLINDAMYCIN PHOSPHATE 900 MG: 900 INJECTION, SOLUTION INTRAVENOUS at 17:30

## 2025-04-07 RX ADMIN — INSULIN ASPART 3 UNITS: 100 INJECTION, SOLUTION INTRAVENOUS; SUBCUTANEOUS at 19:16

## 2025-04-07 ASSESSMENT — ACTIVITIES OF DAILY LIVING (ADL)
ADLS_ACUITY_SCORE: 41
ADLS_ACUITY_SCORE: 23
ADLS_ACUITY_SCORE: 25
ADLS_ACUITY_SCORE: 25
ADLS_ACUITY_SCORE: 23
ADLS_ACUITY_SCORE: 41
ADLS_ACUITY_SCORE: 41
ADLS_ACUITY_SCORE: 25
ADLS_ACUITY_SCORE: 25
ADLS_ACUITY_SCORE: 41
ADLS_ACUITY_SCORE: 41
ADLS_ACUITY_SCORE: 23
ADLS_ACUITY_SCORE: 23
ADLS_ACUITY_SCORE: 41
ADLS_ACUITY_SCORE: 41

## 2025-04-07 ASSESSMENT — COLUMBIA-SUICIDE SEVERITY RATING SCALE - C-SSRS
1. IN THE PAST MONTH, HAVE YOU WISHED YOU WERE DEAD OR WISHED YOU COULD GO TO SLEEP AND NOT WAKE UP?: NO
6. HAVE YOU EVER DONE ANYTHING, STARTED TO DO ANYTHING, OR PREPARED TO DO ANYTHING TO END YOUR LIFE?: NO
2. HAVE YOU ACTUALLY HAD ANY THOUGHTS OF KILLING YOURSELF IN THE PAST MONTH?: NO

## 2025-04-07 NOTE — ED NOTES
[facility]  ED Nurse Handoff Report    ED Chief complaint: Foot Injury  . ED Diagnosis:   Final diagnoses:   Wound of foot       Allergies: No Known Allergies    Code Status: Full Code    Activity level - Baseline/Home:  independent.  Activity Level - Current:   standby.   Lift room needed: No.   Bariatric: No   Needed: No   Isolation: No.   Infection: Not Applicable.     Respiratory status: Room air    Vital Signs (within 30 minutes):   Vitals:    04/07/25 1256 04/07/25 1257 04/07/25 1258 04/07/25 1259   BP:       Pulse:       Resp:       Temp:       TempSrc:       SpO2: 100% 100% 100% 100%   Weight:       Height:           Cardiac Rhythm:  ,      Pain level:    Patient confused:  No .   Patient Falls Risk: nonskid shoes/slippers when out of bed, patient and family education, activity supervised, and mobility aid in reach.   Elimination Status: Has voided     Patient Report - Initial Complaint: Foot Injury.   Focused Assessment: Pt is a 65 year old male who presents to the ED for evaluation of a foot injury. Justino reports 1 week ago he trimmed a callus on the lateral side of his left foot. Since then, he developed a necrotic skin infection to the dorsolateral aspect of the left foot which wraps around to the bottom of his foot. He denies fever or chills. Patient denies history of diabetes, vascular disease, arterial disease, or smoking.      Abnormal Results:   Labs Ordered and Resulted from Time of ED Arrival to Time of ED Departure   BASIC METABOLIC PANEL - Abnormal       Result Value    Sodium 127 (*)     Potassium 3.7      Chloride 88 (*)     Carbon Dioxide (CO2) 20 (*)     Anion Gap 19 (*)     Urea Nitrogen 12.9      Creatinine 0.79      GFR Estimate >90      Calcium 9.1      Glucose 282 (*)    CRP INFLAMMATION - Abnormal    CRP Inflammation 380.90 (*)    ERYTHROCYTE SEDIMENTATION RATE AUTO - Abnormal    Erythrocyte Sedimentation Rate 46 (*)    CBC WITH PLATELETS AND  DIFFERENTIAL - Abnormal    WBC Count 16.4 (*)     RBC Count 4.73      Hemoglobin 14.5      Hematocrit 40.5      MCV 86      MCH 30.7      MCHC 35.8      RDW 11.7      Platelet Count 340      % Neutrophils 79      % Lymphocytes 9      % Monocytes 10      % Eosinophils 0      % Basophils 0      % Immature Granulocytes 2      NRBCs per 100 WBC 0      Absolute Neutrophils 12.9 (*)     Absolute Lymphocytes 1.5      Absolute Monocytes 1.6 (*)     Absolute Eosinophils 0.0      Absolute Basophils 0.1      Absolute Immature Granulocytes 0.3      Absolute NRBCs 0.0     HEMOGLOBIN A1C - Abnormal    Estimated Average Glucose 306 (*)     Hemoglobin A1C 12.3 (*)    GLUCOSE BY METER - Abnormal    GLUCOSE BY METER POCT 239 (*)    LACTIC ACID WHOLE BLOOD WITH 1X REPEAT IN 2 HR WHEN >2 - Normal    Lactic Acid, Initial 1.9     MAGNESIUM - Normal    Magnesium 1.9     PHOSPHORUS - Normal    Phosphorus 3.1     RBC AND PLATELET MORPHOLOGY    RBC Morphology Confirmed RBC Indices      Platelet Assessment        Value: Automated Count Confirmed. Platelet morphology is normal.   GLUCOSE MONITOR NURSING POCT   GLUCOSE MONITOR NURSING POCT   LIPID REFLEX TO DIRECT LDL PANEL   BLOOD CULTURE   BLOOD CULTURE   AEROBIC BACTERIAL CULTURE ROUTINE   ANAEROBIC BACTERIAL CULTURE ROUTINE   MRSA MSSA PCR, NASAL SWAB        Foot XR, G/E 3 views, left   Final Result   IMPRESSION: Extensive soft tissue gas within the dorsal forefoot along the second through fifth metacarpal heads/necks and surrounding the fifth MTP joint, highly concerning for necrotizing soft tissue infection. There is some localized osteopenia at the    medial base of the fifth proximal phalanx which may be secondary to superimposition of the soft tissue gas. No discrete osseous destructive change. Minimal scattered degenerative arthritis. Plantar calcaneal spur. Benign dystrophic calcifications along    the course of the plantar fascia. Arterial calcifications.      NOTE: ABNORMAL REPORT       THE DICTATION ABOVE DESCRIBES AN ABNORMALITY FOR WHICH FOLLOW-UP IS NEEDED.       MR Foot Left w/o Contrast    (Results Pending)       Treatments provided: See MAR  Family Comments: None  OBS brochure/video discussed/provided to patient:  N/A  ED Medications:   Medications   sodium chloride 0.9% BOLUS 1,000 mL (1,000 mLs Intravenous $New Bag 4/7/25 1314)   lidocaine 1 % 0.1-1 mL (has no administration in time range)   lidocaine (LMX4) cream (has no administration in time range)   sodium chloride (PF) 0.9% PF flush 3 mL (3 mLs Intracatheter $Given 4/7/25 1314)   sodium chloride (PF) 0.9% PF flush 3 mL (3 mLs Intracatheter $Given 4/7/25 1313)   senna-docusate (SENOKOT-S/PERICOLACE) 8.6-50 MG per tablet 1 tablet (has no administration in time range)     Or   senna-docusate (SENOKOT-S/PERICOLACE) 8.6-50 MG per tablet 2 tablet (has no administration in time range)   glucose gel 15-30 g (has no administration in time range)     Or   dextrose 50 % injection 25-50 mL (has no administration in time range)     Or   glucagon injection 1 mg (has no administration in time range)   sodium chloride 0.9 % infusion (has no administration in time range)   acetaminophen (TYLENOL) tablet 650 mg (has no administration in time range)   oxyCODONE IR (ROXICODONE) half-tab 2.5 mg (has no administration in time range)   oxyCODONE (ROXICODONE) tablet 5 mg (has no administration in time range)   HYDROmorphone (DILAUDID) injection 0.2 mg (has no administration in time range)   HYDROmorphone (DILAUDID) injection 0.4 mg (has no administration in time range)   ondansetron (ZOFRAN ODT) ODT tab 4 mg (has no administration in time range)     Or   ondansetron (ZOFRAN) injection 4 mg (has no administration in time range)   insulin aspart (NovoLOG) injection (RAPID ACTING) (has no administration in time range)   piperacillin-tazobactam (ZOSYN) 4.5 g vial to attach to  mL bag (has no administration in time range)   clindamycin (CLEOCIN) 900 mg  in 50 mL D5W intermittent infusion (has no administration in time range)   hydrALAZINE (APRESOLINE) injection 5 mg (has no administration in time range)   insulin glargine (LANTUS PEN) injection 10 Units (has no administration in time range)   piperacillin-tazobactam (ZOSYN) 4.5 g vial to attach to  mL bag (0 g Intravenous Stopped 4/7/25 1133)   clindamycin (CLEOCIN) 900 mg in 50 mL D5W intermittent infusion (0 mg Intravenous Stopped 4/7/25 1133)   vancomycin (VANCOCIN) 2,500 mg in 0.9% NaCl 525 mL intermittent infusion (0 mg Intravenous Stopped 4/7/25 1301)       Drips infusing:  No  For the majority of the shift this patient was Green.   Interventions performed were N/A.    Sepsis treatment initiated: No    Cares/treatment/interventions/medications to be completed following ED care: See MAR    ED Nurse Name: Nita Duran RN  1:26 PM

## 2025-04-07 NOTE — CONSULTS
[facility]  CONSULTATION NOTE    CHIEF COMPLAINT:      I was asked by Bismark Neal MD to evaluate this patient for left foot.    PATIENT HISTORY:  Justino Giordano is a 65 year old male  with a past medical history significant for what's listed below, presented for a worsening left foot infection. States that the site has only been there for a week and that he was shaving a callus at that time and  blood. Denies pain currently. States feels well and doesn't feel sick. Denies a known history of diabetes. Denies N/F/V/C/D.         Review of Systems:  A 10 point review of systems was performed and is positive for that noted above in the patient history.  All other areas are negative.     PAST MEDICAL HISTORY:   Past Medical History:   Diagnosis Date    Elevated blood pressure reading without diagnosis of hypertension     Epigastric hernia     Other and unspecified hyperlipidemia         PAST SURGICAL HISTORY:   Past Surgical History:   Procedure Laterality Date    COLONOSCOPY  8/27/2013    Procedure: COLONOSCOPY;  Colonoscopy ;  Surgeon: Ash Brasher MD;  Location:  GI    HC KNEE SCOPE,REMV LOOSE BODY      right knee, left knee scoped    HERNIORRHAPHY EPIGASTRIC  12/12/2012    Procedure: HERNIORRHAPHY EPIGASTRIC;  Epigastric Hernia repair with Mesh ;  Surgeon: Viv Alcantara DO;  Location:  OR        MEDICATIONS:  Reviewed in Epic. Current.     ALLERGIES:  No Known Allergies     SOCIAL HISTORY:   Social History     Socioeconomic History    Marital status: Single     Spouse name: Marlene    Number of children: 0    Years of education: Not on file    Highest education level: Not on file   Occupational History     Comment: wing and rec for [employer]   Tobacco Use    Smoking status: Never    Smokeless tobacco: Never   Substance and Sexual Activity    Alcohol use: No    Drug use: No    Sexual activity: Not Currently     Partners: Female   Other Topics Concern      "Service Not Asked    Blood Transfusions Not Asked    Caffeine Concern Not Asked    Occupational Exposure Not Asked    Hobby Hazards Not Asked    Sleep Concern Not Asked    Stress Concern Not Asked    Weight Concern Not Asked    Special Diet Not Asked    Back Care Not Asked    Exercise Not Asked    Bike Helmet Not Asked    Seat Belt Yes    Self-Exams Not Asked    Parent/sibling w/ CABG, MI or angioplasty before 65F 55M? Not Asked   Social History Narrative    Not on file     Social Drivers of Health     Financial Resource Strain: Not on file   Food Insecurity: Not on file   Transportation Needs: Not on file   Physical Activity: Not on file   Stress: Not on file   Social Connections: Not on file   Interpersonal Safety: Not on file   Housing Stability: Not on file        FAMILY HISTORY:   Family History   Problem Relation Age of Onset    Heart Disease Father         heart attack age 59    C.A.D. Father     Heart Disease Mother         heart attack age 57    C.A.D. Mother     Diabetes Sister         EXAM:Vitals: BP (!) 170/114   Pulse 90   Temp 98.2  F (36.8  C) (Temporal)   Resp 20   Ht 1.905 m (6' 3\")   Wt 117.3 kg (258 lb 9.6 oz)   SpO2 99%   BMI 32.32 kg/m    BMI= Body mass index is 32.32 kg/m .    LABS:   .a1c  Last Comprehensive Metabolic Panel:  Sodium   Date Value Ref Range Status   04/08/2021 135 133 - 144 mmol/L Final     Potassium   Date Value Ref Range Status   04/08/2021 3.8 3.4 - 5.3 mmol/L Final     Chloride   Date Value Ref Range Status   04/08/2021 105 94 - 109 mmol/L Final     Carbon Dioxide   Date Value Ref Range Status   04/08/2021 24 20 - 32 mmol/L Final     Anion Gap   Date Value Ref Range Status   04/08/2021 6 3 - 14 mmol/L Final     Glucose   Date Value Ref Range Status   05/10/2021 137 (H) 70 - 99 mg/dL Final     Comment:     Fasting specimen     Urea Nitrogen   Date Value Ref Range Status   04/08/2021 13 7 - 30 mg/dL Final     Creatinine   Date Value Ref Range Status   05/10/2021 0.89 " "0.66 - 1.25 mg/dL Final     GFR Estimate   Date Value Ref Range Status   05/10/2021 >90 >60 mL/min/[1.73_m2] Final     Comment:     Non  GFR Calc  Starting 12/18/2018, serum creatinine based estimated GFR (eGFR) will be   calculated using the Chronic Kidney Disease Epidemiology Collaboration   (CKD-EPI) equation.       Calcium   Date Value Ref Range Status   04/08/2021 8.6 8.5 - 10.1 mg/dL Final     No results found for: \"WBC\"  No results found for: \"RBC\"  Lab Results   Component Value Date    HGB 15.1 01/18/2008     No results found for: \"HCT\"  No results found for: \"PLT\"   No results found for: \"INR\"     General appearance: Patient is alert and fully cooperative with history & exam.  No sign of distress is noted during the visit.      Respiratory: Breathing is regular & unlabored while sitting.      HEENT: Hearing is intact to spoken word.  Speech is clear.  No gross evidence of visual impairment that would impact ambulation.      Dermatologic: Left foot: fifth MPJ with large necrotic ulcer extending plantarly to dorsally. Cellulitis from ulcer to dorsal foot. Some noted streaking into the leg. Noted malodor.      Vascular: Dorsalis pedis and posterior tibial pulses are intact & regular bilaterally.  CFT and skin temperature is normal to both lower extremities.       Neurologic: Lower extremity sensation is diminished, bilateral foot, to light touch.  No evidence of neurological-based weakness or contracture in the lower extremities.       Musculoskeletal: Patient is ambulatory without an assistive device or brace.  No gross foot or ankle deformity noted.       Psychiatric: Affect is pleasant & appropriate.      All cultures:  No results for input(s): \"CULTURE\" in the last 168 hours.     IMAGING:     IMPRESSION: Extensive soft tissue gas within the dorsal forefoot along the second through fifth metacarpal heads/necks and surrounding the fifth MTP joint, highly concerning for necrotizing soft tissue " infection. There is some localized osteopenia at the   medial base of the fifth proximal phalanx which may be secondary to superimposition of the soft tissue gas. No discrete osseous destructive change. Minimal scattered degenerative arthritis. Plantar calcaneal spur. Benign dystrophic calcifications along   the course of the plantar fascia. Arterial calcifications.    I personally reviewed the images and agree with the reports as stated.  Noted gas gangrene to fifth MPJ     ASSESSMENT:  Left foot fifth MPJ gas gangrene  Diabetes Mellitus --> hba1c: 12.3     MEDICAL DECISION MAKING:   -Discussed all findings with patient. Chart and imaging reviewed.     -Discussed left foot infection at length. Reviewed xrays which show soft tissue gas isoalted to the fifth MPJ. Discussed with him that given the extent of the soft tissue infection, he'll need a fifth ray resection. Discussed with him that he'll probably need at least 2-3 procedures for full infection control and will likely need a wound vac post op. Discussed an anticipate long recovery for full healing.     -Will schedule for tomorrow. Patient's lactic is normal and vitals are appropriate. Noted leukocytosis and crp over 300.     -Also discussed diabetes as a cause for neuroapthy and lower extremity infections. At time of evaluation, hba1c wasn't back. Reviewed now and is over 12. Patient will need new diabetic management and education.     -Further plans pending surgery. Scheduled for tomorrow at approx 12. NPO after midnight.     -WB to heel of LLE       Thank you for the consultation request and the opportunity to participate in the care of Justino Preciado DPM   [facility]  Available via [employer]

## 2025-04-07 NOTE — H&P
[facility]  Internal Medicine  History and Physical      Patient Name: Justino Giordano MRN# [id number]   Age: 65 year old YOB: 1960     Date of Admission:4/7/2025    Primary care provider: No Ref-Primary, Physician  Date of Service: 4/7/2025         Assessment and Plan:   Justino Giordano is a 65 year old male with a history of HTN, DM Type 2, HLD, Obesity who presents to the ED today with a left foot injury.      Sepsis due to Necrotic Left Foot Infection  Pt presents with a left foot wound that developed after he used a razor blade to shave a callus on his left foot one week ago.  He is afebrile, hypertensive, HR 90, RR 20.  WBC 16.4,  with negative lactic acid.  Left Foot Xray reveals extensive soft tissue gas within the dorsal forefoot along the second through fifth metacarpal heads/necks and surrounding the fifth MTP joint, highly concerning for necrotizing soft tissue infection. There is some localized osteopenia at the medial base of the fifth proximal phalanx which may be secondary to superimposition of the soft tissue gas. No discrete osseous destructive change. Minimal scattered degenerative arthritis.  - npo, IVF, antiemetics, analgesics prn  - continue Vancomycin, Zosyn, Clindamycin  - Podiatry consult  - follow up wound and blood cultures       Untreated and Uncontrolled DM Type 2  Hgb A1C 11.7 (4/2021) and was 6.6 in (2013).  BS on admission 282.  Bicarb 20, AG 19.  Not technically DKA by criteria.  Patient denies he has a history of diabetes, although reports he has had high blood sugars in the past.  No longer takes Metformin and never started Lantus as recommended in 2021.  - IVF hydration  - check hgb A1C  - start Lantus 10 units for now given need for npo and will titrate prn  - ISS protocol  - will need diabetes education and supplies upon discharge    HTN  HLD  Previously on Atorvastatin, no longer takes this.  BP elevated 150-170s on arrival.  - check lipid  "panel  - monitor bp trend, control pain, likely add oral agent prior to discharge.  IV hydralazine for now    Hyponatremia  Hypochloremia  Sodium when corrected for glucose is 131, chloride 88.  - IVF hydration      CODE: full  Diet/IVF: npo for now  DVT ppx: scd  Medically Ready for Discharge: Anticipated in 2-4 Days        Claudia Escalona MS SHORE  Physician Assistant   Hospitalist Service                Chief Complaint:   Foot Injury         HPI:   65 year old male with a history of HTN, DM Type 2, HLD, Obesity who presents to the ED today with a left foot injury.    History obtained from patient, chart review and verbal discussion with the ED provider.    Pt presents with a left foot wound that developed after he used a razor blade to shave a callus on his left foot one week ago.  He reports the wound was bleeding after he used the razor and he placed a bandage.  He has developed progressively worse left foot redness, swelling and has noticed the tissue of his foot turned black and he developed a \"hole\" on the bottom of his foot which is new as well.  He denies he has a history of diabetes, although reports he has had high blood sugars in the past.  No longer takes Metformin and never started Lantus as recommended in 2021.  Denies any history of Neuropathy or MRSA.         Past Medical History:     Past Medical History:   Diagnosis Date    Elevated blood pressure reading without diagnosis of hypertension     Epigastric hernia     Other and unspecified hyperlipidemia           Past Surgical History:     Past Surgical History:   Procedure Laterality Date    COLONOSCOPY  8/27/2013    Procedure: COLONOSCOPY;  Colonoscopy ;  Surgeon: Ash Brasher MD;  Location:  GI    HC KNEE SCOPE,REMV LOOSE BODY      right knee, left knee scoped    HERNIORRHAPHY EPIGASTRIC  12/12/2012    Procedure: HERNIORRHAPHY EPIGASTRIC;  Epigastric Hernia repair with Mesh ;  Surgeon: Viv Alcantara DO;  Location:  OR          " "Social History:     Social History     Socioeconomic History    Marital status: Single     Spouse name: Marlene    Number of children: 0    Years of education: Not on file    Highest education level: Not on file   Occupational History     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 [employer]   Tobacco Use    Smoking status: Never    Smokeless tobacco: Never   Substance and Sexual Activity    Alcohol use: No    Drug use: No    Sexual activity: Not Currently     Partners: Female   Other Topics Concern     Service Not Asked    Blood Transfusions Not Asked    Caffeine Concern Not Asked    Occupational Exposure Not Asked    Hobby Hazards Not Asked    Sleep Concern Not Asked    Stress Concern Not Asked    Weight Concern Not Asked    Special Diet Not Asked    Back Care Not Asked    Exercise Not Asked    Bike Helmet Not Asked    Seat Belt Yes    Self-Exams Not Asked    Parent/sibling w/ CABG, MI or angioplasty before 65F 55M? Not Asked   Social History Narrative    Not on file     Social Drivers of Health     Financial Resource Strain: Not on file   Food Insecurity: Not on file   Transportation Needs: Not on file   Physical Activity: Not on file   Stress: Not on file   Social Connections: Not on file   Interpersonal Safety: Not on file   Housing Stability: Not on file          Family History:     Family History   Problem Relation Age of Onset    Heart Disease Father         heart attack age 59    C.A.D. Father     Heart Disease Mother         heart attack age 57    C.A.D. Mother     Diabetes Sister           Allergies:    No Known Allergies       Medications:     Prior to Admission medications    Not on File          Review of Systems:   A complete ROS was performed and is negative other than what is stated in the HPI.       Physical Exam:   Blood pressure (!) 170/114, pulse 90, temperature 98.2  F (36.8  C), temperature source Temporal, resp. rate 20, height 1.905 m (6' 3\"), weight 117.3 kg (258 lb 9.6 oz), SpO2 99%.  General: " Alert, interactive, NAD  HEENT: AT/NC  Chest/Resp: clear to auscultation bilaterally, no crackles or wheezes  Heart/CV: regular rate and rhythm, no murmur  Abdomen/GI: Soft, nontender, nondistended. +BS.  Extremities/MSK/Skin: see pic in ED provider note from 4/7/25.  Malodorous wound.  Erythema of the left foot with an open, necrotic appearing wound on the lateral portion of the foot with open wound on the plantar aspect    Neuro: Alert & oriented x 3         Labs:     CMP  Recent Labs   Lab 04/07/25  0937   *   POTASSIUM 3.7   CHLORIDE 88*   CO2 20*   ANIONGAP 19*   *   BUN 12.9   CR 0.79   GFRESTIMATED >90   VERNON 9.1     CBC  Recent Labs   Lab 04/07/25  0937   WBC 16.4*   RBC 4.73   HGB 14.5   HCT 40.5   MCV 86   MCH 30.7   MCHC 35.8   RDW 11.7

## 2025-04-07 NOTE — PLAN OF CARE
[facility]    ED Boarding Nurse Handoff Addendum Report:    Date/time: 4/7/2025, 2:11 PM    Activity Level: in bed    Fall Risk: Yes:  bed/chair alarm on, nonskid shoes/slippers when out of bed, arm band in place, patient and family education, and assistive device/personal items within reach    Active Infusions: IVF    Current Meds Due: IVF, insulin novolog and Lantus.     Current care needs: IVF    Oxygen requirements (liters/min and/or FiO2): no    Respiratory status: Room air    Vital signs (within last 30 minutes):    Vitals:    04/07/25 1257 04/07/25 1258 04/07/25 1259 04/07/25 1409   BP:    (!) 142/86   BP Location:    Left arm   Patient Position:    Semi-Montalvo's   Cuff Size:    Adult Regular   Pulse:    82   Resp:    16   Temp:    98.9  F (37.2  C)   TempSrc:    Oral   SpO2: 100% 100% 100% 100%   Weight:       Height:           Focused assessment within last 30 minutes:    A&OX4. Up with SBA. Denies any pain. VSS. On RA.      ED Boarding Nurse name: Alondra Shaikh RN         Goal Outcome Evaluation:      Plan of Care Reviewed With: patient    Overall Patient Progress: no changeOverall Patient Progress: no change    Outcome Evaluation: A&OX4. Up with SBA. Denies any pain. VSS. On RA.      Problem: Adult Inpatient Plan of Care  Goal: Plan of Care Review  Description: The Plan of Care Review/Shift note should be completed every shift.  The Outcome Evaluation is a brief statement about your assessment that the patient is improving, declining, or no change.  This information will be displayed automatically on your shiftnote.  Outcome: Progressing  Flowsheets (Taken 4/7/2025 1410)  Outcome Evaluation: A&OX4. Up with SBA. Denies any pain. VSS. On RA.  Plan of Care Reviewed With: patient  Overall Patient Progress: no change  Goal: Patient-Specific Goal (Individualized)  Description: You can add care plan individualizations to a care plan. Examples of Individualization might be:   "\"Parent requests to be called daily at 9am for status\", \"I have a hard time hearing out of my right ear\", or \"Do not touch me to wake me up as it startlesme\".  Outcome: Progressing  Goal: Absence of Hospital-Acquired Illness or Injury  Outcome: Progressing  Goal: Optimal Comfort and Wellbeing  Outcome: Progressing  Goal: Readiness for Transition of Care  Outcome: Progressing     Problem: Delirium  Goal: Optimal Coping  Outcome: Progressing  Goal: Improved Behavioral Control  Outcome: Progressing  Goal: Improved Attention and Thought Clarity  Outcome: Progressing  Goal: Improved Sleep  Outcome: Progressing       "

## 2025-04-07 NOTE — ED PROVIDER NOTES
Emergency Department Note      History of Present Illness     Chief Complaint   Foot Injury      HPI   Justino Giordano is a 65 year old male who presents to the ED for evaluation of a foot injury. Justino reports 1 week ago he trimmed a callus on the lateral side of his left foot. Since then, he developed a necrotic skin infection to the dorsolateral aspect of the left foot which wraps around to the bottom of his foot. He denies fever or chills. Patient denies history of diabetes, vascular disease, arterial disease, or smoking.    Independent Historian   None    Review of External Notes   Patient out reach note reviewed from 8/16/2021, where patient was attempted to be contacted for diabetes check, though phone was not answered    Past Medical History     Medical History and Problem List   Type 2 diabetes mellitus  Hyperlipidemia  Hypertension  Hernia    Medications   The patient denies taking any medications.    Surgical History   Epigastric herniorrhaphy    Physical Exam     Patient Vitals for the past 24 hrs:   BP Temp Temp src Pulse Resp SpO2 Height Weight   04/07/25 1259 -- -- -- -- -- 100 % -- --   04/07/25 1258 -- -- -- -- -- 100 % -- --   04/07/25 1257 -- -- -- -- -- 100 % -- --   04/07/25 1256 -- -- -- -- -- 100 % -- --   04/07/25 1255 -- -- -- -- -- 100 % -- --   04/07/25 1250 -- -- -- -- -- 100 % -- --   04/07/25 1249 -- -- -- -- -- 100 % -- --   04/07/25 1248 -- -- -- -- -- 100 % -- --   04/07/25 1247 -- -- -- -- -- 100 % -- --   04/07/25 1246 -- -- -- -- -- 98 % -- --   04/07/25 1245 (!) 150/90 -- -- 79 -- 100 % -- --   04/07/25 1215 (!) 148/92 -- -- 72 -- 100 % -- --   04/07/25 1132 -- -- -- -- -- 100 % -- --   04/07/25 1131 -- -- -- -- -- 100 % -- --   04/07/25 1130 (!) 158/106 -- -- 83 -- 100 % -- --   04/07/25 1115 (!) 171/100 -- -- 91 -- 100 % -- --   04/07/25 1100 (!) 150/97 -- -- 82 -- 100 % -- --   04/07/25 1000 (!) 142/86 -- -- 87 -- 100 % -- --   04/07/25 0837 (!) 170/114 98.2  F (36.8  C)  "Temporal 90 20 99 % 1.905 m (6' 3\") 117.3 kg (258 lb 9.6 oz)     Physical Exam  General:              Well-nourished              Speaking in full sentences   Resting comfortably on gurney  Eyes:              Conjunctiva without injection or scleral icterus  Resp:              Even, non-labored respirations  CV:                    Regular rate and rhythm  GI:              Abdomen soft, non-tender  Skin:              Warm, dry, well perfused              No rashes or open wounds on exposed skin  MSK:              Moves all extremities              Necrotic appearing wound to lateral aspect of left foot              Erythema extending dorsally across midfoot              Malodorous appearing wound  Neuro:              Alert              Answers questions appropriately              Moves all extremities equally              Gait stable  Psych:              Normal affect, normal mood        Diagnostics     Lab Results   Labs Ordered and Resulted from Time of ED Arrival to Time of ED Departure   BASIC METABOLIC PANEL - Abnormal       Result Value    Sodium 127 (*)     Potassium 3.7      Chloride 88 (*)     Carbon Dioxide (CO2) 20 (*)     Anion Gap 19 (*)     Urea Nitrogen 12.9      Creatinine 0.79      GFR Estimate >90      Calcium 9.1      Glucose 282 (*)    CRP INFLAMMATION - Abnormal    CRP Inflammation 380.90 (*)    ERYTHROCYTE SEDIMENTATION RATE AUTO - Abnormal    Erythrocyte Sedimentation Rate 46 (*)    CBC WITH PLATELETS AND DIFFERENTIAL - Abnormal    WBC Count 16.4 (*)     RBC Count 4.73      Hemoglobin 14.5      Hematocrit 40.5      MCV 86      MCH 30.7      MCHC 35.8      RDW 11.7      Platelet Count 340      % Neutrophils 79      % Lymphocytes 9      % Monocytes 10      % Eosinophils 0      % Basophils 0      % Immature Granulocytes 2      NRBCs per 100 WBC 0      Absolute Neutrophils 12.9 (*)     Absolute Lymphocytes 1.5      Absolute Monocytes 1.6 (*)     Absolute Eosinophils 0.0      Absolute Basophils 0.1  "     Absolute Immature Granulocytes 0.3      Absolute NRBCs 0.0     HEMOGLOBIN A1C - Abnormal    Estimated Average Glucose 306 (*)     Hemoglobin A1C 12.3 (*)    GLUCOSE BY METER - Abnormal    GLUCOSE BY METER POCT 239 (*)    LACTIC ACID WHOLE BLOOD WITH 1X REPEAT IN 2 HR WHEN >2 - Normal    Lactic Acid, Initial 1.9     MAGNESIUM - Normal    Magnesium 1.9     PHOSPHORUS - Normal    Phosphorus 3.1     RBC AND PLATELET MORPHOLOGY    RBC Morphology Confirmed RBC Indices      Platelet Assessment        Value: Automated Count Confirmed. Platelet morphology is normal.   GLUCOSE MONITOR NURSING POCT   GLUCOSE MONITOR NURSING POCT   LIPID REFLEX TO DIRECT LDL PANEL   BLOOD CULTURE   BLOOD CULTURE   AEROBIC BACTERIAL CULTURE ROUTINE   ANAEROBIC BACTERIAL CULTURE ROUTINE   MRSA MSSA PCR, NASAL SWAB       Imaging   Foot XR, G/E 3 views, left   Final Result   IMPRESSION: Extensive soft tissue gas within the dorsal forefoot along the second through fifth metacarpal heads/necks and surrounding the fifth MTP joint, highly concerning for necrotizing soft tissue infection. There is some localized osteopenia at the    medial base of the fifth proximal phalanx which may be secondary to superimposition of the soft tissue gas. No discrete osseous destructive change. Minimal scattered degenerative arthritis. Plantar calcaneal spur. Benign dystrophic calcifications along    the course of the plantar fascia. Arterial calcifications.      NOTE: ABNORMAL REPORT      THE DICTATION ABOVE DESCRIBES AN ABNORMALITY FOR WHICH FOLLOW-UP IS NEEDED.       MR Foot Left w/o Contrast    (Results Pending)     Independent Interpretation   X-ray left foot shows soft tissue gas    ED Course      Medications Administered   Medications   sodium chloride 0.9% BOLUS 1,000 mL (1,000 mLs Intravenous $New Bag 4/7/25 6347)   lidocaine 1 % 0.1-1 mL (has no administration in time range)   lidocaine (LMX4) cream (has no administration in time range)   sodium chloride  (PF) 0.9% PF flush 3 mL (3 mLs Intracatheter $Given 4/7/25 1314)   sodium chloride (PF) 0.9% PF flush 3 mL (3 mLs Intracatheter $Given 4/7/25 1313)   senna-docusate (SENOKOT-S/PERICOLACE) 8.6-50 MG per tablet 1 tablet (has no administration in time range)     Or   senna-docusate (SENOKOT-S/PERICOLACE) 8.6-50 MG per tablet 2 tablet (has no administration in time range)   glucose gel 15-30 g (has no administration in time range)     Or   dextrose 50 % injection 25-50 mL (has no administration in time range)     Or   glucagon injection 1 mg (has no administration in time range)   sodium chloride 0.9 % infusion (has no administration in time range)   acetaminophen (TYLENOL) tablet 650 mg (has no administration in time range)   oxyCODONE IR (ROXICODONE) half-tab 2.5 mg (has no administration in time range)   oxyCODONE (ROXICODONE) tablet 5 mg (has no administration in time range)   HYDROmorphone (DILAUDID) injection 0.2 mg (has no administration in time range)   HYDROmorphone (DILAUDID) injection 0.4 mg (has no administration in time range)   ondansetron (ZOFRAN ODT) ODT tab 4 mg (has no administration in time range)     Or   ondansetron (ZOFRAN) injection 4 mg (has no administration in time range)   insulin aspart (NovoLOG) injection (RAPID ACTING) (has no administration in time range)   piperacillin-tazobactam (ZOSYN) 4.5 g vial to attach to  mL bag (has no administration in time range)   clindamycin (CLEOCIN) 900 mg in 50 mL D5W intermittent infusion (has no administration in time range)   hydrALAZINE (APRESOLINE) injection 5 mg (has no administration in time range)   insulin glargine (LANTUS PEN) injection 10 Units (has no administration in time range)   piperacillin-tazobactam (ZOSYN) 4.5 g vial to attach to  mL bag (0 g Intravenous Stopped 4/7/25 1133)   clindamycin (CLEOCIN) 900 mg in 50 mL D5W intermittent infusion (0 mg Intravenous Stopped 4/7/25 5512)   vancomycin (VANCOCIN) 2,500 mg in 0.9% NaCl  525 mL intermittent infusion (0 mg Intravenous Stopped 4/7/25 1301)       Procedures   Procedures     Discussion of Management   ED Course    ED Course   ED Course as of 04/07/25 1327   Mon Apr 07, 2025   0847 I obtained history and examined the patient as noted above.    0906 I spoke with Dr. Preciado of podiatry regarding the patient's history and presentation today. They will come down to see the patient.   0907 I rechecked and updated the patient.    1041 Spoke with hospitalist who accepted for admission.   1126 Spoke again with podiatry regarding labs and XR findings.  They are aware and have seen the patient in consultation.         Additional Documentation  None    Medical Decision Making / Diagnosis     MIPS   None    MDM   Justino Giordano is a 65 year old male presenting to the ED for evaluation of a left foot wound.  VS on presentation reveal elevated BP though otherwise are unremarkable.  Examination and workup as outlined above, with findings concerning for necrotizing wound of the left foot.  This is supported by leukocytosis, elevated inflammatory markers, appearance of the wound, as well as radiographic evidence of soft tissue gas.  I did consult podiatry following my initial evaluation, and they have seen and evaluated patient at bedside.  Appreciate their consultation.  He would started on broad-spectrum antibiotics including Zosyn, vancomycin, and clindamycin.  Fortunately, he has remained hemodynamically stable.  He will require admission for further management and likely surgical intervention.  Case discussed with hospital team.  Blood cultures have been obtained.  Patient denies any prior diagnosis of diabetes, though I highly suspicious he does have this, and is likely a contributing factor to his presenting symptoms.    Disposition   The patient was admitted to the hospital.     Diagnosis     ICD-10-CM    1. 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 Wound of foot  S91.309A          Scribe Disclosure:  I, Sally Priest, am serving as a scribe at 8:56 AM on 4/7/2025 to document services personally performed by Bismark Neal MD based on my observations and the provider's statements to me.        Bismark Neal MD  04/07/25 1660

## 2025-04-07 NOTE — PHARMACY-VANCOMYCIN DOSING SERVICE
"Pharmacy Vancomycin Initial Note  Date of Service 2025  Patient's  1960  65 year old, male    Indication: Sepsis and Skin and Soft Tissue Infection    Current estimated CrCl = Estimated Creatinine Clearance: 128.7 mL/min (based on SCr of 0.79 mg/dL).    Creatinine for last 3 days  2025:  9:37 AM Creatinine 0.79 mg/dL    Recent Vancomycin Level(s) for last 3 days  No results found for requested labs within last 3 days.      Vancomycin IV Administrations (past 72 hours)                     vancomycin (VANCOCIN) 2,500 mg in 0.9% NaCl 525 mL intermittent infusion (mg) 2,500 mg New Bag 25 0951                    Nephrotoxins and other renal medications (From now, onward)      Start     Dose/Rate Route Frequency Ordered Stop    25 2200  vancomycin (VANCOCIN) 1,500 mg in 0.9% NaCl 265 mL intermittent infusion         1,500 mg  over 90 Minutes Intravenous EVERY 12 HOURS 25 1534      25 1600  piperacillin-tazobactam (ZOSYN) 4.5 g vial to attach to  mL bag        Note to Pharmacy: For [facility], [facility] and [facility]: For Zosyn-naive patients, use the \"Zosyn initial dose + extended infusion\" order panel.    4.5 g  over 30 Minutes Intravenous EVERY 6 HOURS 25 1146              Contrast Orders - past 72 hours (72h ago, onward)      None            [employer] Prediction of Planned Initial Vancomycin Regimen  Plan:Loading dose: 2500mg  Regimen: 1500 mg IV every 12 hours.  Start time: 22:00 on 2025  Exposure target: AUC24 (range)400-600 mg/L.hr   AUC24,ss: 551 mg/L.hr  Probability of AUC24 > 400: 81 %  Ctrough,ss: 17.6 mg/L  Probability of Ctrough,ss > 20: 39 %  Probability of nephrotoxicity (Lodise STUART ): 13 %    Start vancomycin  1500 mg IV q12h.   Vancomycin monitoring method: AUC  Vancomycin therapeutic monitoring goal: 400-600 mg*h/L  Pharmacy will check vancomycin levels as appropriate in 1-3 Days.    Serum creatinine levels will be ordered daily for the first week of therapy " and at least twice weekly for subsequent weeks.      Ada German RP

## 2025-04-07 NOTE — PROGRESS NOTES
04/07/25 1500   Skin   Skin WDL X;integrity;color   Skin Color pale;other (see comments);redness blanchable  (generally pale, redness to left foot noted and marked with boarder)   Skin Integrity other (see comments)   Redness blanchable location Buttocks/IT  (scrotum)   Other (see comments) L foot wound   Device Skin Interventions Taken Skin barrier applied     Admission/Transfer from: ED  2 RN skin assessment completed. Yes with Laura Ford RN  Significant findings include: Left foot wound with eschar.   LDA added (if applicable)? YES: added in for left foot wound.   Requested WOC Nurse Consult from MD? YES: consult from podiatry placed. Asking for additional WOC consult.

## 2025-04-07 NOTE — PHARMACY-ADMISSION MEDICATION HISTORY
Pharmacy Intern Admission Medication History    Admission medication history is complete. The information provided in this note is only as accurate as the sources available at the time of the update.    Information Source(s): Patient and CareEverywhere/SureScripts via in-person    Pertinent Information: No OTC or Rx medications PTA     Changes made to PTA medication list:  Added: None  Deleted: Atorvastatin, Metformin   Changed: None    Allergies reviewed with patient and updates made in EHR: yes    Medication History Completed By: Patricia Calixto RPH 4/7/2025 10:19 AM    No outpatient medications have been marked as taking for the 4/7/25 encounter (Hospital Encounter).

## 2025-04-07 NOTE — ED TRIAGE NOTES
"Pt states \"I have a infection in my foot\"  states he shaved a bunion off his left foot about a week ago, states it bled, then stopped. Now c/o foot is \"red and black\". Denies pain.     Triage Assessment (Adult)       Row Name 04/07/25 0838          Triage Assessment    Airway WDL WDL        Respiratory WDL    Respiratory WDL WDL        Cardiac WDL    Cardiac WDL WDL        Peripheral/Neurovascular WDL    Peripheral Neurovascular WDL WDL        Cognitive/Neuro/Behavioral WDL    Cognitive/Neuro/Behavioral WDL WDL                     "

## 2025-04-08 ENCOUNTER — ANESTHESIA EVENT (OUTPATIENT)
Dept: SURGERY | Facility: CLINIC | Age: 65
End: 2025-04-08
Payer: MEDICARE

## 2025-04-08 ENCOUNTER — APPOINTMENT (OUTPATIENT)
Dept: GENERAL RADIOLOGY | Facility: CLINIC | Age: 65
End: 2025-04-08
Attending: PODIATRIST
Payer: MEDICARE

## 2025-04-08 ENCOUNTER — ANESTHESIA (OUTPATIENT)
Dept: SURGERY | Facility: CLINIC | Age: 65
End: 2025-04-08
Payer: MEDICARE

## 2025-04-08 LAB
ANION GAP SERPL CALCULATED.3IONS-SCNC: 16 MMOL/L (ref 7–15)
BACTERIA SPEC CULT: ABNORMAL
BUN SERPL-MCNC: 9.2 MG/DL (ref 8–23)
CALCIUM SERPL-MCNC: 8.2 MG/DL (ref 8.8–10.4)
CHLORIDE SERPL-SCNC: 95 MMOL/L (ref 98–107)
CREAT SERPL-MCNC: 0.75 MG/DL (ref 0.67–1.17)
EGFRCR SERPLBLD CKD-EPI 2021: >90 ML/MIN/1.73M2
ERYTHROCYTE [DISTWIDTH] IN BLOOD BY AUTOMATED COUNT: 11.6 % (ref 10–15)
GLUCOSE BLDC GLUCOMTR-MCNC: 183 MG/DL (ref 70–99)
GLUCOSE BLDC GLUCOMTR-MCNC: 186 MG/DL (ref 70–99)
GLUCOSE BLDC GLUCOMTR-MCNC: 188 MG/DL (ref 70–99)
GLUCOSE BLDC GLUCOMTR-MCNC: 203 MG/DL (ref 70–99)
GLUCOSE BLDC GLUCOMTR-MCNC: 205 MG/DL (ref 70–99)
GLUCOSE BLDC GLUCOMTR-MCNC: 218 MG/DL (ref 70–99)
GLUCOSE BLDC GLUCOMTR-MCNC: 252 MG/DL (ref 70–99)
GLUCOSE SERPL-MCNC: 206 MG/DL (ref 70–99)
GLUCOSE SERPL-MCNC: 210 MG/DL (ref 70–99)
GRAM STAIN RESULT: ABNORMAL
GRAM STAIN RESULT: ABNORMAL
HCO3 SERPL-SCNC: 17 MMOL/L (ref 22–29)
HCT VFR BLD AUTO: 36.3 % (ref 40–53)
HGB BLD-MCNC: 12.9 G/DL (ref 13.3–17.7)
MAGNESIUM SERPL-MCNC: 1.8 MG/DL (ref 1.7–2.3)
MCH RBC QN AUTO: 30.2 PG (ref 26.5–33)
MCHC RBC AUTO-ENTMCNC: 35.5 G/DL (ref 31.5–36.5)
MCV RBC AUTO: 85 FL (ref 78–100)
PHOSPHATE SERPL-MCNC: 3 MG/DL (ref 2.5–4.5)
PLATELET # BLD AUTO: 310 10E3/UL (ref 150–450)
POTASSIUM SERPL-SCNC: 3.3 MMOL/L (ref 3.4–5.3)
POTASSIUM SERPL-SCNC: 3.7 MMOL/L (ref 3.4–5.3)
RBC # BLD AUTO: 4.27 10E6/UL (ref 4.4–5.9)
SODIUM SERPL-SCNC: 128 MMOL/L (ref 135–145)
WBC # BLD AUTO: 14.4 10E3/UL (ref 4–11)

## 2025-04-08 PROCEDURE — 87205 SMEAR GRAM STAIN: CPT | Performed by: PODIATRIST

## 2025-04-08 PROCEDURE — 999N000065 XR FOOT PORT LEFT 2 VIEWS: Mod: LT

## 2025-04-08 PROCEDURE — 250N000011 HC RX IP 250 OP 636: Mod: JW | Performed by: PODIATRIST

## 2025-04-08 PROCEDURE — 250N000013 HC RX MED GY IP 250 OP 250 PS 637: Performed by: INTERNAL MEDICINE

## 2025-04-08 PROCEDURE — 88311 DECALCIFY TISSUE: CPT | Mod: TC | Performed by: PODIATRIST

## 2025-04-08 PROCEDURE — 87076 CULTURE ANAEROBE IDENT EACH: CPT | Performed by: PODIATRIST

## 2025-04-08 PROCEDURE — 0LBW0ZZ EXCISION OF LEFT FOOT TENDON, OPEN APPROACH: ICD-10-PCS | Performed by: PODIATRIST

## 2025-04-08 PROCEDURE — 370N000017 HC ANESTHESIA TECHNICAL FEE, PER MIN: Performed by: PODIATRIST

## 2025-04-08 PROCEDURE — 250N000011 HC RX IP 250 OP 636: Performed by: PHYSICIAN ASSISTANT

## 2025-04-08 PROCEDURE — 82947 ASSAY GLUCOSE BLOOD QUANT: CPT | Performed by: PHYSICIAN ASSISTANT

## 2025-04-08 PROCEDURE — 87070 CULTURE OTHR SPECIMN AEROBIC: CPT | Performed by: PODIATRIST

## 2025-04-08 PROCEDURE — 999N000197 HC STATISTIC WOC PT EDUCATION, 0-15 MIN

## 2025-04-08 PROCEDURE — 250N000011 HC RX IP 250 OP 636: Performed by: NURSE ANESTHETIST, CERTIFIED REGISTERED

## 2025-04-08 PROCEDURE — 82947 ASSAY GLUCOSE BLOOD QUANT: CPT | Performed by: ANESTHESIOLOGY

## 2025-04-08 PROCEDURE — 250N000011 HC RX IP 250 OP 636: Performed by: INTERNAL MEDICINE

## 2025-04-08 PROCEDURE — 28820 AMPUTATION OF TOE: CPT | Mod: T4 | Performed by: PODIATRIST

## 2025-04-08 PROCEDURE — 84132 ASSAY OF SERUM POTASSIUM: CPT | Performed by: INTERNAL MEDICINE

## 2025-04-08 PROCEDURE — 272N000001 HC OR GENERAL SUPPLY STERILE: Performed by: PODIATRIST

## 2025-04-08 PROCEDURE — 84100 ASSAY OF PHOSPHORUS: CPT | Performed by: INTERNAL MEDICINE

## 2025-04-08 PROCEDURE — 250N000009 HC RX 250: Performed by: NURSE ANESTHETIST, CERTIFIED REGISTERED

## 2025-04-08 PROCEDURE — 0Y6N0ZF DETACHMENT AT LEFT FOOT, PARTIAL 5TH RAY, OPEN APPROACH: ICD-10-PCS | Performed by: PODIATRIST

## 2025-04-08 PROCEDURE — 85041 AUTOMATED RBC COUNT: CPT | Performed by: PHYSICIAN ASSISTANT

## 2025-04-08 PROCEDURE — 36415 COLL VENOUS BLD VENIPUNCTURE: CPT | Performed by: ANESTHESIOLOGY

## 2025-04-08 PROCEDURE — 36415 COLL VENOUS BLD VENIPUNCTURE: CPT | Performed by: PHYSICIAN ASSISTANT

## 2025-04-08 PROCEDURE — 258N000003 HC RX IP 258 OP 636: Performed by: PHYSICIAN ASSISTANT

## 2025-04-08 PROCEDURE — 710N000012 HC RECOVERY PHASE 2, PER MINUTE: Performed by: PODIATRIST

## 2025-04-08 PROCEDURE — 999N000141 HC STATISTIC PRE-PROCEDURE NURSING ASSESSMENT: Performed by: PODIATRIST

## 2025-04-08 PROCEDURE — 360N000083 HC SURGERY LEVEL 3 W/ FLUORO, PER MIN: Performed by: PODIATRIST

## 2025-04-08 PROCEDURE — 250N000013 HC RX MED GY IP 250 OP 250 PS 637: Performed by: PODIATRIST

## 2025-04-08 PROCEDURE — 258N000003 HC RX IP 258 OP 636: Performed by: NURSE ANESTHETIST, CERTIFIED REGISTERED

## 2025-04-08 PROCEDURE — 99232 SBSQ HOSP IP/OBS MODERATE 35: CPT | Performed by: INTERNAL MEDICINE

## 2025-04-08 PROCEDURE — 83735 ASSAY OF MAGNESIUM: CPT | Performed by: INTERNAL MEDICINE

## 2025-04-08 PROCEDURE — 120N000001 HC R&B MED SURG/OB

## 2025-04-08 RX ORDER — ONDANSETRON 2 MG/ML
INJECTION INTRAMUSCULAR; INTRAVENOUS PRN
Status: DISCONTINUED | OUTPATIENT
Start: 2025-04-08 | End: 2025-04-08

## 2025-04-08 RX ORDER — LIDOCAINE 40 MG/G
CREAM TOPICAL
Status: DISCONTINUED | OUTPATIENT
Start: 2025-04-08 | End: 2025-04-08 | Stop reason: HOSPADM

## 2025-04-08 RX ORDER — BISACODYL 10 MG
10 SUPPOSITORY, RECTAL RECTAL DAILY PRN
Status: DISCONTINUED | OUTPATIENT
Start: 2025-04-11 | End: 2025-04-19 | Stop reason: HOSPADM

## 2025-04-08 RX ORDER — METOPROLOL TARTRATE 1 MG/ML
1-2 INJECTION, SOLUTION INTRAVENOUS EVERY 5 MIN PRN
Status: DISCONTINUED | OUTPATIENT
Start: 2025-04-08 | End: 2025-04-08 | Stop reason: HOSPADM

## 2025-04-08 RX ORDER — PROPOFOL 10 MG/ML
INJECTION, EMULSION INTRAVENOUS CONTINUOUS PRN
Status: DISCONTINUED | OUTPATIENT
Start: 2025-04-08 | End: 2025-04-08

## 2025-04-08 RX ORDER — AMOXICILLIN 250 MG
1 CAPSULE ORAL 2 TIMES DAILY
Status: DISCONTINUED | OUTPATIENT
Start: 2025-04-08 | End: 2025-04-19 | Stop reason: HOSPADM

## 2025-04-08 RX ORDER — POLYETHYLENE GLYCOL 3350 17 G/17G
17 POWDER, FOR SOLUTION ORAL DAILY
Status: DISCONTINUED | OUTPATIENT
Start: 2025-04-09 | End: 2025-04-17

## 2025-04-08 RX ORDER — HYDROMORPHONE HCL IN WATER/PF 6 MG/30 ML
0.4 PATIENT CONTROLLED ANALGESIA SYRINGE INTRAVENOUS EVERY 5 MIN PRN
Status: DISCONTINUED | OUTPATIENT
Start: 2025-04-08 | End: 2025-04-08 | Stop reason: HOSPADM

## 2025-04-08 RX ORDER — SODIUM CHLORIDE, SODIUM LACTATE, POTASSIUM CHLORIDE, CALCIUM CHLORIDE 600; 310; 30; 20 MG/100ML; MG/100ML; MG/100ML; MG/100ML
INJECTION, SOLUTION INTRAVENOUS CONTINUOUS
Status: DISCONTINUED | OUTPATIENT
Start: 2025-04-08 | End: 2025-04-08 | Stop reason: HOSPADM

## 2025-04-08 RX ORDER — NALOXONE HYDROCHLORIDE 0.4 MG/ML
0.1 INJECTION, SOLUTION INTRAMUSCULAR; INTRAVENOUS; SUBCUTANEOUS
Status: DISCONTINUED | OUTPATIENT
Start: 2025-04-08 | End: 2025-04-08 | Stop reason: HOSPADM

## 2025-04-08 RX ORDER — PROPOFOL 10 MG/ML
INJECTION, EMULSION INTRAVENOUS PRN
Status: DISCONTINUED | OUTPATIENT
Start: 2025-04-08 | End: 2025-04-08

## 2025-04-08 RX ORDER — DEXAMETHASONE SODIUM PHOSPHATE 4 MG/ML
4 INJECTION, SOLUTION INTRA-ARTICULAR; INTRALESIONAL; INTRAMUSCULAR; INTRAVENOUS; SOFT TISSUE
Status: DISCONTINUED | OUTPATIENT
Start: 2025-04-08 | End: 2025-04-08 | Stop reason: HOSPADM

## 2025-04-08 RX ORDER — ACETAMINOPHEN 325 MG/1
975 TABLET ORAL EVERY 8 HOURS
Status: DISCONTINUED | OUTPATIENT
Start: 2025-04-08 | End: 2025-04-13

## 2025-04-08 RX ORDER — HYDRALAZINE HYDROCHLORIDE 20 MG/ML
2.5-5 INJECTION INTRAMUSCULAR; INTRAVENOUS EVERY 10 MIN PRN
Status: DISCONTINUED | OUTPATIENT
Start: 2025-04-08 | End: 2025-04-08 | Stop reason: HOSPADM

## 2025-04-08 RX ORDER — MAGNESIUM SULFATE HEPTAHYDRATE 40 MG/ML
2 INJECTION, SOLUTION INTRAVENOUS ONCE
Status: COMPLETED | OUTPATIENT
Start: 2025-04-08 | End: 2025-04-08

## 2025-04-08 RX ORDER — ACETAMINOPHEN 325 MG/1
975 TABLET ORAL ONCE
Status: DISCONTINUED | OUTPATIENT
Start: 2025-04-08 | End: 2025-04-08 | Stop reason: HOSPADM

## 2025-04-08 RX ORDER — LIDOCAINE 40 MG/G
CREAM TOPICAL
Status: DISCONTINUED | OUTPATIENT
Start: 2025-04-08 | End: 2025-04-08

## 2025-04-08 RX ORDER — POTASSIUM CHLORIDE 1500 MG/1
40 TABLET, EXTENDED RELEASE ORAL ONCE
Status: COMPLETED | OUTPATIENT
Start: 2025-04-08 | End: 2025-04-08

## 2025-04-08 RX ORDER — POTASSIUM CHLORIDE 1.5 G/1.58G
20 POWDER, FOR SOLUTION ORAL ONCE
Status: COMPLETED | OUTPATIENT
Start: 2025-04-08 | End: 2025-04-08

## 2025-04-08 RX ORDER — ONDANSETRON 2 MG/ML
4 INJECTION INTRAMUSCULAR; INTRAVENOUS EVERY 30 MIN PRN
Status: DISCONTINUED | OUTPATIENT
Start: 2025-04-08 | End: 2025-04-08 | Stop reason: HOSPADM

## 2025-04-08 RX ORDER — FENTANYL CITRATE 50 UG/ML
50 INJECTION, SOLUTION INTRAMUSCULAR; INTRAVENOUS EVERY 5 MIN PRN
Status: DISCONTINUED | OUTPATIENT
Start: 2025-04-08 | End: 2025-04-08 | Stop reason: HOSPADM

## 2025-04-08 RX ORDER — LIDOCAINE HYDROCHLORIDE 20 MG/ML
INJECTION, SOLUTION INFILTRATION; PERINEURAL PRN
Status: DISCONTINUED | OUTPATIENT
Start: 2025-04-08 | End: 2025-04-08

## 2025-04-08 RX ORDER — FENTANYL CITRATE 50 UG/ML
25 INJECTION, SOLUTION INTRAMUSCULAR; INTRAVENOUS EVERY 5 MIN PRN
Status: DISCONTINUED | OUTPATIENT
Start: 2025-04-08 | End: 2025-04-08 | Stop reason: HOSPADM

## 2025-04-08 RX ORDER — ATORVASTATIN CALCIUM 40 MG/1
40 TABLET, FILM COATED ORAL EVERY EVENING
Status: DISCONTINUED | OUTPATIENT
Start: 2025-04-08 | End: 2025-04-19 | Stop reason: HOSPADM

## 2025-04-08 RX ORDER — ONDANSETRON 4 MG/1
4 TABLET, ORALLY DISINTEGRATING ORAL EVERY 30 MIN PRN
Status: DISCONTINUED | OUTPATIENT
Start: 2025-04-08 | End: 2025-04-08 | Stop reason: HOSPADM

## 2025-04-08 RX ORDER — KETAMINE HYDROCHLORIDE 10 MG/ML
INJECTION INTRAMUSCULAR; INTRAVENOUS PRN
Status: DISCONTINUED | OUTPATIENT
Start: 2025-04-08 | End: 2025-04-08

## 2025-04-08 RX ORDER — HYDROMORPHONE HCL IN WATER/PF 6 MG/30 ML
0.2 PATIENT CONTROLLED ANALGESIA SYRINGE INTRAVENOUS EVERY 5 MIN PRN
Status: DISCONTINUED | OUTPATIENT
Start: 2025-04-08 | End: 2025-04-08 | Stop reason: HOSPADM

## 2025-04-08 RX ORDER — BUPIVACAINE HYDROCHLORIDE 5 MG/ML
INJECTION, SOLUTION EPIDURAL; INTRACAUDAL; PERINEURAL PRN
Status: DISCONTINUED | OUTPATIENT
Start: 2025-04-08 | End: 2025-04-08 | Stop reason: HOSPADM

## 2025-04-08 RX ORDER — SODIUM CHLORIDE, SODIUM LACTATE, POTASSIUM CHLORIDE, CALCIUM CHLORIDE 600; 310; 30; 20 MG/100ML; MG/100ML; MG/100ML; MG/100ML
INJECTION, SOLUTION INTRAVENOUS CONTINUOUS PRN
Status: DISCONTINUED | OUTPATIENT
Start: 2025-04-08 | End: 2025-04-08

## 2025-04-08 RX ORDER — KETOROLAC TROMETHAMINE 15 MG/ML
15 INJECTION, SOLUTION INTRAMUSCULAR; INTRAVENOUS
Status: DISCONTINUED | OUTPATIENT
Start: 2025-04-08 | End: 2025-04-08 | Stop reason: HOSPADM

## 2025-04-08 RX ADMIN — POTASSIUM CHLORIDE 40 MEQ: 1500 TABLET, EXTENDED RELEASE ORAL at 09:31

## 2025-04-08 RX ADMIN — MIDAZOLAM 2 MG: 1 INJECTION INTRAMUSCULAR; INTRAVENOUS at 12:17

## 2025-04-08 RX ADMIN — CLINDAMYCIN PHOSPHATE 900 MG: 900 INJECTION, SOLUTION INTRAVENOUS at 01:48

## 2025-04-08 RX ADMIN — Medication 1500 MG: at 12:39

## 2025-04-08 RX ADMIN — POTASSIUM CHLORIDE 20 MEQ: 1.5 POWDER, FOR SOLUTION ORAL at 14:34

## 2025-04-08 RX ADMIN — ACETAMINOPHEN 975 MG: 325 TABLET, FILM COATED ORAL at 21:52

## 2025-04-08 RX ADMIN — SODIUM CHLORIDE, POTASSIUM CHLORIDE, SODIUM LACTATE AND CALCIUM CHLORIDE: 600; 310; 30; 20 INJECTION, SOLUTION INTRAVENOUS at 12:14

## 2025-04-08 RX ADMIN — MAGNESIUM SULFATE HEPTAHYDRATE 2 G: 40 INJECTION, SOLUTION INTRAVENOUS at 09:31

## 2025-04-08 RX ADMIN — Medication 1500 MG: at 22:26

## 2025-04-08 RX ADMIN — INSULIN ASPART 2 UNITS: 100 INJECTION, SOLUTION INTRAVENOUS; SUBCUTANEOUS at 09:47

## 2025-04-08 RX ADMIN — CLINDAMYCIN PHOSPHATE 900 MG: 900 INJECTION, SOLUTION INTRAVENOUS at 10:37

## 2025-04-08 RX ADMIN — Medication 25 MG: at 12:19

## 2025-04-08 RX ADMIN — PIPERACILLIN AND TAZOBACTAM 4.5 G: 4; .5 INJECTION, POWDER, FOR SOLUTION INTRAVENOUS at 04:04

## 2025-04-08 RX ADMIN — ACETAMINOPHEN 975 MG: 325 TABLET, FILM COATED ORAL at 14:34

## 2025-04-08 RX ADMIN — PIPERACILLIN AND TAZOBACTAM 4.5 G: 4; .5 INJECTION, POWDER, FOR SOLUTION INTRAVENOUS at 11:08

## 2025-04-08 RX ADMIN — PROPOFOL 30 MG: 10 INJECTION, EMULSION INTRAVENOUS at 12:19

## 2025-04-08 RX ADMIN — INSULIN GLARGINE 10 UNITS: 100 INJECTION, SOLUTION SUBCUTANEOUS at 09:46

## 2025-04-08 RX ADMIN — INSULIN ASPART 1 UNITS: 100 INJECTION, SOLUTION INTRAVENOUS; SUBCUTANEOUS at 04:04

## 2025-04-08 RX ADMIN — PROPOFOL 100 MCG/KG/MIN: 10 INJECTION, EMULSION INTRAVENOUS at 12:20

## 2025-04-08 RX ADMIN — LIDOCAINE HYDROCHLORIDE 50 MG: 20 INJECTION, SOLUTION INFILTRATION; PERINEURAL at 12:19

## 2025-04-08 RX ADMIN — PIPERACILLIN AND TAZOBACTAM 4.5 G: 4; .5 INJECTION, POWDER, FOR SOLUTION INTRAVENOUS at 15:48

## 2025-04-08 RX ADMIN — PIPERACILLIN AND TAZOBACTAM 4.5 G: 4; .5 INJECTION, POWDER, FOR SOLUTION INTRAVENOUS at 21:46

## 2025-04-08 RX ADMIN — ONDANSETRON 4 MG: 2 INJECTION INTRAMUSCULAR; INTRAVENOUS at 12:28

## 2025-04-08 RX ADMIN — SODIUM CHLORIDE, PRESERVATIVE FREE: 5 INJECTION INTRAVENOUS at 01:48

## 2025-04-08 RX ADMIN — ATORVASTATIN CALCIUM 40 MG: 40 TABLET, FILM COATED ORAL at 20:23

## 2025-04-08 RX ADMIN — INSULIN ASPART 1 UNITS: 100 INJECTION, SOLUTION INTRAVENOUS; SUBCUTANEOUS at 00:00

## 2025-04-08 RX ADMIN — CLINDAMYCIN PHOSPHATE 900 MG: 900 INJECTION, SOLUTION INTRAVENOUS at 18:12

## 2025-04-08 ASSESSMENT — ACTIVITIES OF DAILY LIVING (ADL)
ADLS_ACUITY_SCORE: 25
ADLS_ACUITY_SCORE: 24
ADLS_ACUITY_SCORE: 24
ADLS_ACUITY_SCORE: 25
ADLS_ACUITY_SCORE: 31
ADLS_ACUITY_SCORE: 25
ADLS_ACUITY_SCORE: 25
ADLS_ACUITY_SCORE: 32
ADLS_ACUITY_SCORE: 25
ADLS_ACUITY_SCORE: 32
ADLS_ACUITY_SCORE: 25
ADLS_ACUITY_SCORE: 31
ADLS_ACUITY_SCORE: 25
ADLS_ACUITY_SCORE: 24
ADLS_ACUITY_SCORE: 25
ADLS_ACUITY_SCORE: 25
ADLS_ACUITY_SCORE: 31

## 2025-04-08 NOTE — ANESTHESIA CARE TRANSFER NOTE
Patient: Justino Giordano    Procedure: Procedure(s):  Left foot fifth ray resection, left foot excisional debridement       Diagnosis: Gas gangrene of foot (H) [A48.0]  Diagnosis Additional Information: No value filed.    Anesthesia Type:   MAC     Note:    Oropharynx: oropharynx clear of all foreign objects  Level of Consciousness: drowsy  Oxygen Supplementation: face mask  Level of Supplemental Oxygen (L/min / FiO2): 6  Independent Airway: airway patency satisfactory and stable  Dentition: dentition unchanged  Vital Signs Stable: post-procedure vital signs reviewed and stable  Report to RN Given: handoff report given  Patient transferred to: PACU    Handoff Report: Identifed the Patient, Identified the Reponsible Provider, Reviewed the pertinent medical history, Discussed the surgical course, Reviewed Intra-OP anesthesia mangement and issues during anesthesia, Set expectations for post-procedure period and Allowed opportunity for questions and acknowledgement of understanding      Vitals:  Vitals Value Taken Time   /82 04/08/25 1310   Temp     Pulse 77 04/08/25 1310   Resp     SpO2 99 % 04/08/25 1313   Vitals shown include unfiled device data.    Electronically Signed By: MELY Drake CRNA  April 8, 2025  1:13 PM

## 2025-04-08 NOTE — PROGRESS NOTES
[facility]    Medicine Progress Note - Hospitalist Service    Date of Admission:  4/7/2025    Primary Care Physician   Physician No Ref-Primary  CONSULTANTS: podiatry     Assessment & Plan   Justino Giordano is a 65 year old male with a history of HTN, DM Type 2, HLD, Obesity who presents to the ED today with a left foot injury.        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  He has not been taking medications nor caring for his diabetes. .  He was afebrile, hypertensive, HR 90, RR 20.  WBC 16.4,  with negative lactic acid.  Left Foot Xray revealed extensive soft tissue gas within the dorsal forefoot along the second through fifth metacarpal heads/necks and surrounding the fifth MTP joint, highly concerning for necrotizing soft tissue infection. There is some localized osteopenia at the medial base of the fifth proximal phalanx which may be secondary to superimposition of the soft tissue gas. No discrete osseous destructive change. Minimal scattered degenerative arthritis.  He was placed on IV fluids and made NPO.  He was empirically placed on Vancomycin, Zosyn, Clindamycin. Culture with gram stain with a  polymicrobial infection with GNB, GPB, GPC.  Podiatry consulted and planning I&D with likely 5th ray resection with likely postop wound vac and may need more than 1 procedure. Follow up wound and blood cultures. Only to be weight bearing to heal on the left lower extremity.  Patient works as a  for [employer].     Noncompliance  Patient has not been taking any medications nor caring for his diabetes, hypertension.  This puts him at risk for readmissions, morbidity, and early mortality. We discussed the importance of taking care of his medical issues at length.      Untreated and Uncontrolled type 2  diabetes, will be insulin dependent   Hgb A1C 11.7 (4/2021) and was 6.6 in (2013).  BS on admission 282 with hemoglobin A1c up to 12.3.  Bicarb 20, AG 19.  Not technically DKA by criteria.  Patient denies he has a history of diabetes, although reports he has had high blood sugars in the past.  No longer takes Metformin and never started Lantus as recommended in 2021. Given the elevated A1c above 10 will need insulin.  Was started on lantus and an insulin correction scale.  Needs close primary care provider follow up with diabetic education.       Hypertension, hyperlipidemia  Previously on Atorvastatin, no longer takes this.  BP elevated 150-170s on arrival. , HDL 41, .  Will start on lipitor.   BP currently stable at 132/84, consider starting an ace inhibitor   - check lipid panel  - monitor bp trend, control pain, likely add oral agent prior to discharge.  IV hydralazine for now     Hyponatremia, Hypochloremia  Sodium when corrected for glucose is 131, chloride 88.  - IVF hydration, repeat sodium 128     Discussed plan of care with nursing, social work/case management and podiatry note      Diet: NPO for Procedure/Surgery per Anesthesia Guidelines Except for: Meds; Clear liquids before procedure/surgery: ADULT (Age GREATER than or Equal to 18 years) - Clear liquids 2 hours before procedure/surgery    DVT Prophylaxis: Pneumatic Compression Devices  Nuñez Catheter: Not present  Lines: None     Cardiac Monitoring: None    RESTRAINTS: not indicated  Code Status: Full Code      This document was created using voice recognition technology.  Please excuse any typographical errors that may have occurred.  Please call with any questions.        Clinically Significant Risk Factors         # Hypokalemia: Lowest K = 3.3 mmol/L in last 2 days, will replace as needed  # Hyponatremia: Lowest Na = 127 mmol/L in last 2 days, will monitor as appropriate  # Hypochloremia: Lowest Cl = 88 mmol/L in last 2 days, will  "monitor as appropriate     # Anion Gap Metabolic Acidosis: Highest Anion Gap = 19 mmol/L in last 2 days, will monitor and treat as appropriate      # Hypertension: Noted on problem list           # DMII: A1C = 12.3 % (Ref range: <5.7 %) within past 6 months, PRESENT ON ADMISSION  # Obesity: Estimated body mass index is 32.29 kg/m  as calculated from the following:    Height as of this encounter: 1.905 m (6' 3\").    Weight as of this encounter: 117.2 kg (258 lb 4.8 oz)., PRESENT ON ADMISSION      complicates all aspects of his care       Disposition Plan      Expected Discharge Date: 04/10/2025                  Barrier to discharge: podiatry surgery  Medically Ready for Discharge: Anticipated in 2-4 Days       Bernardo Valdivia MD  Hospitalist Service  [facility]  Securely message with [employer] (more info)  Text page via [facility] Paging/Directory   ______________________________________________________________________    Interval History   Patient is new to me. Denies any pain, but room is malodorous from his foot infection. Denies f/c/s.  5th ray amputation is planned by podiatry       ROS: A comprehensive review of systems was negative except for items noted in the HPI.  Patient currently denies any fever, chills, sweats, nausea, vomiting, diarrhea, shortness of breath, or chest pain.       Physical Exam   Vital Signs: Temp: 99  F (37.2  C) Temp src: Oral BP: 132/84 Pulse: 76   Resp: 20 SpO2: 98 % O2 Device: None (Room air)    Weight: 258 lbs 4.8 oz    Exam stable from yesterday, room is malodorous   General: Alert, interactive, NAD, lying in bed, appears stated age  HEENT: MMM  Chest/Resp: clear to auscultation bilaterally, no crackles or wheezes, good air movement  Heart/CV: regular rate and rhythm, no murmur  Abdomen/GI: Soft, nontender, nondistended. +BS. obese  Extremities/MSK/Skin: see pic in ED provider note from 4/7/25.  Malodorous wound.  Erythema of the left foot with an open, necrotic " appearing wound on the lateral portion of the foot with open wound on the plantar aspect    Neuro: Alert & oriented x 3, no focal deficits      Data     I have personally reviewed the following data over the past 24 hrs:    14.4 (H)  \   12.9 (L)   / 310     128 (L) 95 (L) 9.2 /  203 (H)   3.3 (L) 17 (L) 0.75 \     TSH: N/A T4: N/A A1C: N/A       Imaging:   Results for orders placed or performed during the hospital encounter of 04/07/25   Foot XR, G/E 3 views, left    Narrative    EXAM: XR FOOT LEFT G/E 3 VIEWS  LOCATION: [facility]  DATE: 4/7/2025    INDICATION: left foot wound  COMPARISON: None.      Impression    IMPRESSION: Extensive soft tissue gas within the dorsal forefoot along the second through fifth metacarpal heads/necks and surrounding the fifth MTP joint, highly concerning for necrotizing soft tissue infection. There is some localized osteopenia at the   medial base of the fifth proximal phalanx which may be secondary to superimposition of the soft tissue gas. No discrete osseous destructive change. Minimal scattered degenerative arthritis. Plantar calcaneal spur. Benign dystrophic calcifications along   the course of the plantar fascia. Arterial calcifications.    NOTE: ABNORMAL REPORT    THE DICTATION ABOVE DESCRIBES AN ABNORMALITY FOR WHICH FOLLOW-UP IS NEEDED.    MR Foot Left w/o Contrast     Value    Radiologist flags Presumed necrotizing infection, in the left foot,    Narrative    Exam: MRI of the left forefoot and midfoot without contrast dated  4/7/2025.    COMPARISON: Radiographs from the same day.    CLINICAL HISTORY: Evaluate for osteomyelitis in patient with fifth  digit infection.    TECHNIQUE: Multiplanar, multisequence MR imaging of the left forefoot  and midfoot was obtained using standard sequences in 3 orthogonal  planes without the use of intravenous or intra-articular gadolinium  contrast.    FINDINGS:    As seen on the plain radiographs, there is extensive  air within the  subcutaneous soft tissues along the dorsal aspect of the left foot,  dorsal to the third through fifth distal metatarsals, greatest along  the dorsal aspect of the distal fifth metatarsal, at the fifth  metatarsophalangeal joint. Subtle bone marrow edema in the distal  fifth metatarsal as well as in the proximal to mid aspect of the fifth  toe proximal phalanx, with no significant low T1 signal. No  significant fluid at the left fifth metatarsophalangeal joint.     Scattered degenerative changes with subchondral edema including at the  distal first metatarsophalangeal joint as well as at the navicular  cuneiform joint.    The Lisfranc ligament is intact and the Lisfranc joint is intact.    Minimal fluid in the third intermetatarsal bursa.    Mild fatty infiltration within the muscles about the foot with  extensive soft tissue edema. No discrete abscess although contrast was  not administered, mildly limiting evaluation.    The tendinous structures are grossly intact. No intermetatarsal  masses.      Impression    IMPRESSION:  1. Extensive presumed necrotizing infection within the soft tissues of  the left forefoot/midfoot with soft tissue defect seen along the  dorsal lateral aspect of the foot. There is extensive gas formation,  as seen on the radiographs, extending along the dorsal aspect of the  third through fifth metatarsals, greatest at the fifth  metatarsophalangeal joint. No significant joint effusion at the fifth  metatarsophalangeal joint to suggest septic joint. There is reactive  bone marrow edema in the distal fifth metatarsal, and in the fifth toe  proximal phalanx without definite corresponding low T1 signal,  findings presumed to represent osteitis however early osteomyelitis  cannot be excluded. Correlate with clinical exam.    2. Diffuse soft tissue edema within the subcutaneous tissues and  within the muscles of the left forefoot/midfoot, most likely  representing a  cellulitis/myositis. No discrete abscess although  contrast was not administered.    3. Degenerative changes at the navicular cuneiform joint as well as  first metatarsophalangeal joint.    [Consider Follow Up: Presumed necrotizing infection, in the left foot,  as above]    This report will be copied to the [facility] to ensure a  provider acknowledges the finding. [facility] Center is available Monday  through Friday 8am-3:30 pm.       BRIEN MICHAELS MD         SYSTEM ID:  [id number]     Procedures: per podiatry 5th ray amputation 4/8/25 pending   I have personally have reviewed the patient's most up to date radiologic exams,   labs, orders, and medications myself

## 2025-04-08 NOTE — PLAN OF CARE
"To Do:  End of Shift Summary  For vital signs and complete assessments, please see documentation flowsheets.     Pertinent assessments: Pt is alert and oriented x 4. VSS on RA. Denies nausea, dizziness, sob. Advanced to regular diet, tolerating well. ACHS blood sugar, 183. Denies pain. PIV SL. Left ace wrap in place, denies numbness and tingling. Standby assist to bathroom. Call light within reach and able to make needs known.     Major Shift Events: None     Treatment Plan: Symptom management, encourage ambulation, IV abx     Bedside Nurse: Maci Seymour RN                                                 Goal Outcome Evaluation:         Problem: Adult Inpatient Plan of Care  Goal: Plan of Care Review  Description: The Plan of Care Review/Shift note should be completed every shift.  The Outcome Evaluation is a brief statement about your assessment that the patient is improving, declining, or no change.  This information will be displayed automatically on your shiftnote.  Outcome: Progressing  Goal: Patient-Specific Goal (Individualized)  Description: You can add care plan individualizations to a care plan. Examples of Individualization might be:  \"Parent requests to be called daily at 9am for status\", \"I have a hard time hearing out of my right ear\", or \"Do not touch me to wake me up as it startlesme\".  Outcome: Progressing  Goal: Absence of Hospital-Acquired Illness or Injury  Outcome: Progressing  Intervention: Identify and Manage Fall Risk  Recent Flowsheet Documentation  Taken 4/8/2025 1845 by Maci Seymour, RN  Safety Promotion/Fall Prevention:   activity supervised   assistive device/personal items within reach   clutter free environment maintained   increased rounding and observation   increase visualization of patient   lighting adjusted   mobility aid in reach   nonskid shoes/slippers when out of bed   patient and family education   room near nurse's station   room organization consistent   " safety round/check completed   supervised activity   treat reversible contributory factors   treat underlying cause  Intervention: Prevent Infection  Recent Flowsheet Documentation  Taken 4/8/2025 1845 by Maci Seymour RN  Infection Prevention:   cohorting utilized   rest/sleep promoted   single patient room provided  Goal: Optimal Comfort and Wellbeing  Outcome: Progressing  Intervention: Provide Person-Centered Care  Recent Flowsheet Documentation  Taken 4/8/2025 1845 by Maci Seymour RN  Trust Relationship/Rapport: care explained  Goal: Readiness for Transition of Care  Outcome: Progressing     Problem: Delirium  Goal: Optimal Coping  Outcome: Progressing  Goal: Improved Behavioral Control  Outcome: Progressing  Intervention: Minimize Safety Risk  Recent Flowsheet Documentation  Taken 4/8/2025 1845 by Maci Seymour RN  Trust Relationship/Rapport: care explained  Goal: Improved Attention and Thought Clarity  Outcome: Progressing  Goal: Improved Sleep  Outcome: Progressing     Problem: Skin Injury Risk Increased  Goal: Skin Health and Integrity  Outcome: Progressing  Intervention: Plan: Nurse Driven Intervention: Moisture Management  Recent Flowsheet Documentation  Taken 4/8/2025 1845 by Maci Seymour RN  Moisture Interventions: Encourage regular toileting  Intervention: Optimize Skin Protection  Recent Flowsheet Documentation  Taken 4/8/2025 1845 by Maci Seymour RN  Activity Management:   activity adjusted per tolerance   activity encouraged   ambulated to bathroom   back to bed     Problem: Comorbidity Management  Goal: Blood Glucose Levels Within Targeted Range  Outcome: Progressing     Problem: Infection  Goal: Absence of Infection Signs and Symptoms  Outcome: Progressing

## 2025-04-08 NOTE — PLAN OF CARE
"End of Shift Summary  For vital signs and complete assessments, please see documentation flowsheets.     Pertinent assessments: VSS. Afebrile. LS clear. BS x4. Pt denies pain and nausea. NPO, meds' BG were 186 and 188. SBA to ambulate. PIV - IVF. LLE red and hot, dressing in place with slight drainage noted. A&O, calls appropriately for assistance. Slept on/off.    Major Shift Events: none    Treatment Plan: Vanco, Zosyn, Cleocin, Plan to have surgery today, Discharge TBD.      Goal Outcome Evaluation:      Plan of Care Reviewed With: patient    Overall Patient Progress: no change    Outcome Evaluation: Denies pain, BG were 186 and 188, Slept well      Problem: Adult Inpatient Plan of Care  Goal: Plan of Care Review  Description: The Plan of Care Review/Shift note should be completed every shift.  The Outcome Evaluation is a brief statement about your assessment that the patient is improving, declining, or no change.  This information will be displayed automatically on your shiftnote.  Outcome: Not Progressing  Flowsheets (Taken 4/8/2025 0602)  Outcome Evaluation: Denies pain, BG were 186 and 188, Slept well  Plan of Care Reviewed With: patient  Overall Patient Progress: no change  Goal: Patient-Specific Goal (Individualized)  Description: You can add care plan individualizations to a care plan. Examples of Individualization might be:  \"Parent requests to be called daily at 9am for status\", \"I have a hard time hearing out of my right ear\", or \"Do not touch me to wake me up as it startlesme\".  Outcome: Not Progressing  Goal: Absence of Hospital-Acquired Illness or Injury  Outcome: Not Progressing  Intervention: Identify and Manage Fall Risk  Recent Flowsheet Documentation  Taken 4/7/2025 2335 by Maci Rocha, RN  Safety Promotion/Fall Prevention:   activity supervised   assistive device/personal items within reach   clutter free environment maintained   increased rounding and observation   increase " visualization of patient   lighting adjusted   mobility aid in reach   nonskid shoes/slippers when out of bed   patient and family education   room near nurse's station   room organization consistent   safety round/check completed   supervised activity   treat reversible contributory factors   treat underlying cause  Intervention: Prevent Skin Injury  Recent Flowsheet Documentation  Taken 4/7/2025 2335 by Maci Rocha RN  Body Position:   position changed independently   supine  Intervention: Prevent and Manage VTE (Venous Thromboembolism) Risk  Recent Flowsheet Documentation  Taken 4/7/2025 2335 by Maci Rocha RN  VTE Prevention/Management: SCDs off (sequential compression devices)  Intervention: Prevent Infection  Recent Flowsheet Documentation  Taken 4/7/2025 2335 by Maci Rocha RN  Infection Prevention:   cohorting utilized   rest/sleep promoted   single patient room provided  Goal: Optimal Comfort and Wellbeing  Outcome: Not Progressing  Goal: Readiness for Transition of Care  Outcome: Not Progressing     Problem: Delirium  Goal: Optimal Coping  Outcome: Not Progressing  Goal: Improved Behavioral Control  Outcome: Not Progressing  Intervention: Minimize Safety Risk  Recent Flowsheet Documentation  Taken 4/7/2025 2335 by Maci Rocha RN  Enhanced Safety Measures:   pain management   review medications for side effects with activity   room near unit station  Goal: Improved Attention and Thought Clarity  Outcome: Not Progressing  Goal: Improved Sleep  Outcome: Not Progressing     Problem: Skin Injury Risk Increased  Goal: Skin Health and Integrity  Outcome: Not Progressing  Intervention: Optimize Skin Protection  Recent Flowsheet Documentation  Taken 4/8/2025 0000 by Maci Rocha RN  Activity Management: ambulated to bathroom  Taken 4/7/2025 2335 by Maci Rocha RN  Activity Management:   activity adjusted per tolerance   up ad ricardo  Head of Bed (HOB)  Positioning: HOB flat     Problem: Comorbidity Management  Goal: Blood Glucose Levels Within Targeted Range  Outcome: Not Progressing  Intervention: Monitor and Manage Glycemia  Recent Flowsheet Documentation  Taken 4/7/2025 2335 by Maci Rocha RN  Medication Review/Management: medications reviewed     Problem: Infection  Goal: Absence of Infection Signs and Symptoms  Outcome: Not Progressing

## 2025-04-08 NOTE — PLAN OF CARE
"End of Shift Summary  For vital signs and complete assessments, please see documentation flowsheets.     Pertinent assessments: Aox4 VSS on RA except HTN. Afebrile. Foot wound to Left foot with eschar. No reports of pain. Up SBA in room. 2 PIV , one infusing NS at 100mL. All other systems assessments unremarkable.     Major Shift Events admitted to floor, skin check, iv abx started    Treatment Plan: iv zosyn, clindamycin, vancomycin. Sliding scale insuling surgery tomorrow. NPO at MN    Bedside Nurse: Tolu Guido RN     Goal Outcome Evaluation:      Problem: Adult Inpatient Plan of Care  Goal: Plan of Care Review  Description: The Plan of Care Review/Shift note should be completed every shift.  The Outcome Evaluation is a brief statement about your assessment that the patient is improving, declining, or no change.  This information will be displayed automatically on your shiftnote.  4/7/2025 2204 by Tolu Guido RN  Outcome: Progressing  Flowsheets (Taken 4/7/2025 2204)  Outcome Evaluation: AOx4, VSS, no reports of pain. on iv abx. personal care needs met.  4/7/2025 2204 by Tolu Guido RN  Outcome: Progressing  Flowsheets (Taken 4/7/2025 2204)  Outcome Evaluation: AOx4, VSS, no reports of pain. on iv abx. personal care needs met.  Plan of Care Reviewed With: patient  Overall Patient Progress: no change  Goal: Patient-Specific Goal (Individualized)  Description: You can add care plan individualizations to a care plan. Examples of Individualization might be:  \"Parent requests to be called daily at 9am for status\", \"I have a hard time hearing out of my right ear\", or \"Do not touch me to wake me up as it startlesme\".  4/7/2025 2204 by Tolu Guido RN  Outcome: Progressing  4/7/2025 2204 by Tolu Giudo RN  Outcome: Progressing  Goal: Absence of Hospital-Acquired Illness or Injury  4/7/2025 2204 by Tolu Guido RN  Outcome: Progressing  4/7/2025 2204 by Tolu Guido, " RN  Outcome: Progressing  Intervention: Identify and Manage Fall Risk  Recent Flowsheet Documentation  Taken 4/7/2025 1500 by Tolu Guido RN  Safety Promotion/Fall Prevention:   clutter free environment maintained   mobility aid in reach   nonskid shoes/slippers when out of bed   safety round/check completed   room organization consistent   room near nurse's station  Intervention: Prevent Skin Injury  Recent Flowsheet Documentation  Taken 4/7/2025 1500 by Tolu Guido RN  Body Position: position changed independently  Intervention: Prevent and Manage VTE (Venous Thromboembolism) Risk  Recent Flowsheet Documentation  Taken 4/7/2025 1500 by Tolu Guido RN  VTE Prevention/Management: SCDs off (sequential compression devices)  Intervention: Prevent Infection  Recent Flowsheet Documentation  Taken 4/7/2025 1500 by Tolu Guido RN  Infection Prevention:   environmental surveillance performed   cohorting utilized   equipment surfaces disinfected   hand hygiene promoted  Goal: Optimal Comfort and Wellbeing  4/7/2025 2204 by Tolu Guido RN  Outcome: Progressing  4/7/2025 2204 by Tolu Guido RN  Outcome: Progressing  Goal: Readiness for Transition of Care  4/7/2025 2204 by Tolu Guido RN  Outcome: Progressing  4/7/2025 2204 by Tolu Guido RN  Outcome: Progressing  Intervention: Mutually Develop Transition Plan  Recent Flowsheet Documentation  Taken 4/7/2025 1500 by Tolu Guido RN  Equipment Currently Used at Home: none     Problem: Delirium  Goal: Optimal Coping  4/7/2025 2204 by Tolu Guido RN  Outcome: Progressing  4/7/2025 2204 by Tolu Guido RN  Outcome: Progressing  Goal: Improved Behavioral Control  4/7/2025 2204 by Tolu Guido RN  Outcome: Progressing  4/7/2025 2204 by Tolu Guido RN  Outcome: Progressing  Goal: Improved Attention and Thought Clarity  4/7/2025 2204 by Tolu Guido RN  Outcome:  Progressing  4/7/2025 2204 by Tolu Guido RN  Outcome: Progressing  Goal: Improved Sleep  4/7/2025 2204 by Tolu Guido RN  Outcome: Progressing  4/7/2025 2204 by Tolu Guido RN  Outcome: Progressing     Problem: Skin Injury Risk Increased  Goal: Skin Health and Integrity  4/7/2025 2204 by Tolu Guido RN  Outcome: Progressing  4/7/2025 2204 by Tolu Guido RN  Outcome: Progressing  Intervention: Plan: Nurse Driven Intervention: Moisture Management  Recent Flowsheet Documentation  Taken 4/7/2025 1928 by Tolu Guido RN  Moisture Interventions:   Encourage regular toileting   No brief in bed   Incontinence pad  Bathing/Skin Care: (independent of hygiene cares, proper practices encouraged) other (see comments)  Intervention: Optimize Skin Protection  Recent Flowsheet Documentation  Taken 4/7/2025 1500 by Tolu Guido RN  Activity Management:   activity adjusted per tolerance   activity encouraged  Head of Bed (HOB) Positioning: HOB lowered     Problem: Comorbidity Management  Goal: Blood Glucose Levels Within Targeted Range  4/7/2025 2204 by Tolu Guido RN  Outcome: Progressing  4/7/2025 2204 by Tolu Guido RN  Outcome: Progressing     Problem: Infection  Goal: Absence of Infection Signs and Symptoms  4/7/2025 2204 by Tolu Guido RN  Outcome: Progressing  4/7/2025 2204 by Tolu Guido RN  Outcome: Progressing         Plan of Care Reviewed With: patient    Overall Patient Progress: no changeOverall Patient Progress: no change    Outcome Evaluation: AOx4, VSS, no reports of pain. on iv abx. personal care needs met.

## 2025-04-08 NOTE — OP NOTE
PREOPERATIVE DIAGNOSES:     1.  Left foot gas gangrene, fifth metatarsal phalangeal joint          POSTOPERATIVE DIAGNOSES:   1.   Left foot gas gangrene, fifth metatarsal phalangeal joint      PROCEDURE:     1. Left foot fifth ray resection   2. Left foot excisional debridement down to and including tendon for site measuring 6 cm x 4 cm x 2 cm       ANESTHESIA:  MAC with local.       HEMOSTASIS:  Electrocautery.       ESTIMATED BLOOD LOSS:  10 mL.       SPECIMENS:  Soft tissue culture, fifth ray for pathology       MATERIALS:  None    Indications: Justino Giordano  has an ulcer and active infection to the left foot. Xray and MRI confirm osteomyelitis to the fifth ray and gas gangrene. Given this, recommendation was made for a fifth ray resection. Discussed with him that he has a significant infection and will need multiple procedures including possible further amputation and up to a transmetatarsral amputation. Procedure was discussed with patient at length, including all risks and benefits. Patient agrees to planned procedure.     Procedure: Under mild sedation pt was brought into the OR & placed on the operating table in a supine position. A total of  10 cc of .5% marcaine were injected into the fifth met in a mays block formation  The foot was scrubbed, prepped and draped in an aseptic manner.  An incision was made along the fifth ray, excising all necrotic and infection tissue. The freer elevator was then used to reflect all soft tissue from the fifth metatarsal and the sagittal saw was then used to cut through the metatarsal, starting dorsally and moving plantarly. This was continued in through and through cuts and the bones and digits were sharply excised and sent to pathology. All bleeders were ligated and cauterized as necessary.     Following this, significant time was spent debriding the site due to a large amount of infection. Excisional debridement was done with a 15 blade and a rongeur along the plantar  foot and dorsal foot. An additional incision was made on the dorsal foot extending laterally to medially. Purulence was expressed from here. Time was also spent excising nonviable tendon from the dorsal and lateral foot, using the rongeur. Upon completion, the debrided, additional site measured as above and was down to and including tendon.    All resected bone was sent to pathology.  All nonviable soft tissue was resected  insuring no necrotic tissue proximal to the amputation remained. Next copious amounts of saline were used to flush the amputation site. A couple of retention sutures were placed using 2-0 nylon. Upon completion of suturing, a non-adhesive sterile dressing was then placed over the surgical site followed by 4 x 4s, kerlix, & an ACE wrap. Cultures were taken consisting of deep soft tissue.    The pt tolerated the procedure & anesthesia well.  He was transferred to the recovery room with vital signs stable and vascular status intact to the left foot.  Following a period of post-op monitoring the pt will return to his hospital bed with the following written and oral post-op instructions:    Keep dressing clean, dry and intact.  Do not remove dressing.  WB to heel of LLE  Elevate foot at rest.  Continue IV antibiotics  Contact Dr. Preciado if any post-op questions or arrise.     Intraop findings: Large amount of purulence and necrosis from site, excised. Less than expected bleeding for procedure.     Post op plan: Will see on POD#1. Will need further surgery and likely vascular studies when dressing is smaller and able to get appropriate results.

## 2025-04-08 NOTE — PLAN OF CARE
"  Problem: Adult Inpatient Plan of Care  Goal: Plan of Care Review  Description: The Plan of Care Review/Shift note should be completed every shift.  The Outcome Evaluation is a brief statement about your assessment that the patient is improving, declining, or no change.  This information will be displayed automatically on your shiftnote.  Outcome: Progressing  Flowsheets (Taken 4/8/2025 1110)  Outcome Evaluation: pt sent to pacu for surgery.  Plan of Care Reviewed With: patient  Overall Patient Progress: no change  Goal: Patient-Specific Goal (Individualized)  Description: You can add care plan individualizations to a care plan. Examples of Individualization might be:  \"Parent requests to be called daily at 9am for status\", \"I have a hard time hearing out of my right ear\", or \"Do not touch me to wake me up as it startlesme\".  Outcome: Progressing  Goal: Absence of Hospital-Acquired Illness or Injury  Outcome: Progressing  Intervention: Identify and Manage Fall Risk  Recent Flowsheet Documentation  Taken 4/8/2025 0927 by Allie Pierre RN  Safety Promotion/Fall Prevention:   activity supervised   assistive device/personal items within reach   clutter free environment maintained   increased rounding and observation   increase visualization of patient   lighting adjusted   mobility aid in reach   nonskid shoes/slippers when out of bed   patient and family education   room near nurse's station   room organization consistent   safety round/check completed   supervised activity   treat reversible contributory factors   treat underlying cause  Intervention: Prevent Skin Injury  Recent Flowsheet Documentation  Taken 4/8/2025 0927 by Allie Pierre RN  Body Position:   position changed independently   supine  Intervention: Prevent and Manage VTE (Venous Thromboembolism) Risk  Recent Flowsheet Documentation  Taken 4/8/2025 0949 by Allie Pierre RN  VTE Prevention/Management: SCDs off (sequential " compression devices)  Taken 4/8/2025 0927 by Allie Pierre RN  VTE Prevention/Management: SCDs off (sequential compression devices)  Intervention: Prevent Infection  Recent Flowsheet Documentation  Taken 4/8/2025 0927 by Allie Pierre RN  Infection Prevention:   cohorting utilized   rest/sleep promoted   single patient room provided  Goal: Optimal Comfort and Wellbeing  Outcome: Progressing  Goal: Readiness for Transition of Care  Outcome: Progressing     Problem: Delirium  Goal: Optimal Coping  Outcome: Progressing  Goal: Improved Behavioral Control  Outcome: Progressing  Intervention: Minimize Safety Risk  Recent Flowsheet Documentation  Taken 4/8/2025 0927 by Allie Pierre RN  Enhanced Safety Measures:   pain management   review medications for side effects with activity   room near unit station  Goal: Improved Attention and Thought Clarity  Outcome: Progressing  Goal: Improved Sleep  Outcome: Progressing     Problem: Skin Injury Risk Increased  Goal: Skin Health and Integrity  Outcome: Progressing     Problem: Comorbidity Management  Goal: Blood Glucose Levels Within Targeted Range  Outcome: Progressing  Intervention: Monitor and Manage Glycemia  Recent Flowsheet Documentation  Taken 4/8/2025 0927 by Allie Pierre RN  Medication Review/Management: medications reviewed     Problem: Infection  Goal: Absence of Infection Signs and Symptoms  Outcome: Progressing       Goal Outcome Evaluation:      Plan of Care Reviewed With: patient    Overall Patient Progress: no changeOverall Patient Progress: no change    Outcome Evaluation: pt sent to pacu for surgery.

## 2025-04-08 NOTE — PROVIDER NOTIFICATION
Notified Dr. Zepeda patient pre-operative blood glucose 205. No further orders. Dr. Zepeda states he does not want to treat blood glucose in pre-op. Will treat blood glucose post-op.    Manjula Pappas RN on 4/8/2025 at 12:17 PM

## 2025-04-08 NOTE — PROGRESS NOTES
[facility]  PROGRESS NOTE    CHIEF COMPLAINT:      I was asked by Bismark Neal MD to evaluate this patient for left foot.    PATIENT HISTORY:  Justino Giordano is a 65 year old male seen in follow up for his left foot. Plan for surgery today. Denies pain. Denies N/F/V/C/D.     Review of Systems:  A 10 point review of systems was performed and is positive for that noted above in the patient history.  All other areas are negative.     PAST MEDICAL HISTORY:   Past Medical History:   Diagnosis Date    Elevated blood pressure reading without diagnosis of hypertension     Epigastric hernia     Other and unspecified hyperlipidemia         PAST SURGICAL HISTORY:   Past Surgical History:   Procedure Laterality Date    COLONOSCOPY  8/27/2013    Procedure: COLONOSCOPY;  Colonoscopy ;  Surgeon: Ash Brasher MD;  Location:  GI    HC KNEE SCOPE,REMV LOOSE BODY      right knee, left knee scoped    HERNIORRHAPHY EPIGASTRIC  12/12/2012    Procedure: HERNIORRHAPHY EPIGASTRIC;  Epigastric Hernia repair with Mesh ;  Surgeon: Viv Alcantara DO;  Location:  OR        MEDICATIONS:  Reviewed in Epic. Current.     ALLERGIES:  No Known Allergies     SOCIAL HISTORY:   Social History     Socioeconomic History    Marital status: Single     Spouse name: Marlene    Number of children: 0    Years of education: Not on file    Highest education level: Not on file   Occupational History     Comment: [employer] for [employer]   Tobacco Use    Smoking status: Never    Smokeless tobacco: Never   Substance and Sexual Activity    Alcohol use: No    Drug use: No    Sexual activity: Not Currently     Partners: Female   Other Topics Concern     Service Not Asked    Blood Transfusions Not Asked    Caffeine Concern Not Asked    Occupational Exposure Not Asked    Hobby Hazards Not Asked    Sleep Concern Not Asked    Stress Concern Not Asked    Weight Concern Not Asked    Special Diet Not Asked    Back Care  Not Asked    Exercise Not Asked    Bike Helmet Not Asked    Seat Belt Yes    Self-Exams Not Asked    Parent/sibling w/ CABG, MI or angioplasty before 65F 55M? Not Asked   Social History Narrative    Not on file     Social Drivers of Health     Financial Resource Strain: Low Risk  (4/7/2025)    Financial Resource Strain     Within the past 12 months, have you or your family members you live with been unable to get utilities (heat, electricity) when it was really needed?: No   Food Insecurity: Low Risk  (4/7/2025)    Food Insecurity     Within the past 12 months, did you worry that your food would run out before you got money to buy more?: No     Within the past 12 months, did the food you bought just not last and you didn t have money to get more?: No   Transportation Needs: Low Risk  (4/7/2025)    Transportation Needs     Within the past 12 months, has lack of transportation kept you from medical appointments, getting your medicines, non-medical meetings or appointments, work, or from getting things that you need?: No   Physical Activity: Not on file   Stress: Not on file   Social Connections: Not on file   Interpersonal Safety: Low Risk  (4/7/2025)    Interpersonal Safety     Do you feel physically and emotionally safe where you currently live?: Yes     Within the past 12 months, have you been hit, slapped, kicked or otherwise physically hurt by someone?: No     Within the past 12 months, have you been humiliated or emotionally abused in other ways by your partner or ex-partner?: No   Housing Stability: Low Risk  (4/7/2025)    Housing Stability     Do you have housing? : Yes     Are you worried about losing your housing?: No        FAMILY HISTORY:   Family History   Problem Relation Age of Onset    Heart Disease Father         heart attack age 59    C.A.D. Father     Heart Disease Mother         heart attack age 57    C.A.D. Mother     Diabetes Sister         EXAM:Vitals: BP (!) 145/91 (BP Location: Left arm)    "Pulse 87   Temp 98.1  F (36.7  C) (Core)   Resp 16   Ht 1.905 m (6' 3\")   Wt 117.2 kg (258 lb 4.8 oz)   SpO2 98%   BMI 32.29 kg/m    BMI= Body mass index is 32.29 kg/m .    LABS:   .a1c  Last Comprehensive Metabolic Panel:  Sodium   Date Value Ref Range Status   04/08/2025 128 (L) 135 - 145 mmol/L Final   04/08/2021 135 133 - 144 mmol/L Final     Potassium   Date Value Ref Range Status   04/08/2025 3.3 (L) 3.4 - 5.3 mmol/L Final   04/08/2021 3.8 3.4 - 5.3 mmol/L Final     Chloride   Date Value Ref Range Status   04/08/2025 95 (L) 98 - 107 mmol/L Final   04/08/2021 105 94 - 109 mmol/L Final     Carbon Dioxide   Date Value Ref Range Status   04/08/2021 24 20 - 32 mmol/L Final     Carbon Dioxide (CO2)   Date Value Ref Range Status   04/08/2025 17 (L) 22 - 29 mmol/L Final     Anion Gap   Date Value Ref Range Status   04/08/2025 16 (H) 7 - 15 mmol/L Final   04/08/2021 6 3 - 14 mmol/L Final     Glucose   Date Value Ref Range Status   05/10/2021 137 (H) 70 - 99 mg/dL Final     Comment:     Fasting specimen     GLUCOSE BY METER POCT   Date Value Ref Range Status   04/08/2025 205 (H) 70 - 99 mg/dL Final     Urea Nitrogen   Date Value Ref Range Status   04/08/2025 9.2 8.0 - 23.0 mg/dL Final   04/08/2021 13 7 - 30 mg/dL Final     Creatinine   Date Value Ref Range Status   04/08/2025 0.75 0.67 - 1.17 mg/dL Final   05/10/2021 0.89 0.66 - 1.25 mg/dL Final     GFR Estimate   Date Value Ref Range Status   04/08/2025 >90 >60 mL/min/1.73m2 Final     Comment:     eGFR calculated using 2021 CKD-EPI equation.   05/10/2021 >90 >60 mL/min/[1.73_m2] Final     Comment:     Non  GFR Calc  Starting 12/18/2018, serum creatinine based estimated GFR (eGFR) will be   calculated using the Chronic Kidney Disease Epidemiology Collaboration   (CKD-EPI) equation.       Calcium   Date Value Ref Range Status   04/08/2025 8.2 (L) 8.8 - 10.4 mg/dL Final   04/08/2021 8.6 8.5 - 10.1 mg/dL Final     No results found for: \"WBC\"  No " "results found for: \"RBC\"  Lab Results   Component Value Date    HGB 15.1 01/18/2008     No results found for: \"HCT\"  No results found for: \"PLT\"   No results found for: \"INR\"     General appearance: Patient is alert and fully cooperative with history & exam.  No sign of distress is noted during the visit.      Respiratory: Breathing is regular & unlabored while sitting.      HEENT: Hearing is intact to spoken word.  Speech is clear.  No gross evidence of visual impairment that would impact ambulation.      Dermatologic: Left foot: fifth MPJ with large necrotic ulcer extending plantarly to dorsally. Cellulitis from ulcer to dorsal foot. Some noted streaking into the leg. Noted malodor.      Vascular: Dorsalis pedis and posterior tibial pulses are intact & regular bilaterally.  CFT and skin temperature is normal to both lower extremities.       Neurologic: Lower extremity sensation is diminished, bilateral foot, to light touch.  No evidence of neurological-based weakness or contracture in the lower extremities.       Musculoskeletal: Patient is ambulatory without an assistive device or brace.  No gross foot or ankle deformity noted.       Psychiatric: Affect is pleasant & appropriate.      All cultures:  Recent Labs   Lab 04/07/25  1535 04/07/25  0937   CULTURE Culture in progress No growth after 12 hours  No growth after 12 hours        IMAGING:     IMPRESSION: Extensive soft tissue gas within the dorsal forefoot along the second through fifth metacarpal heads/necks and surrounding the fifth MTP joint, highly concerning for necrotizing soft tissue infection. There is some localized osteopenia at the   medial base of the fifth proximal phalanx which may be secondary to superimposition of the soft tissue gas. No discrete osseous destructive change. Minimal scattered degenerative arthritis. Plantar calcaneal spur. Benign dystrophic calcifications along   the course of the plantar fascia. Arterial calcifications.    I " personally reviewed the images and agree with the reports as stated.  Noted gas gangrene to fifth MPJ     Left foot MRI  IMPRESSION:  1. Extensive presumed necrotizing infection within the soft tissues of  the left forefoot/midfoot with soft tissue defect seen along the  dorsal lateral aspect of the foot. There is extensive gas formation,  as seen on the radiographs, extending along the dorsal aspect of the  third through fifth metatarsals, greatest at the fifth  metatarsophalangeal joint. No significant joint effusion at the fifth  metatarsophalangeal joint to suggest septic joint. There is reactive  bone marrow edema in the distal fifth metatarsal, and in the fifth toe  proximal phalanx without definite corresponding low T1 signal,  findings presumed to represent osteitis however early osteomyelitis  cannot be excluded. Correlate with clinical exam.     2. Diffuse soft tissue edema within the subcutaneous tissues and  within the muscles of the left forefoot/midfoot, most likely  representing a cellulitis/myositis. No discrete abscess although  contrast was not administered.     3. Degenerative changes at the navicular cuneiform joint as well as  first metatarsophalangeal joint.    I personally reviewed the images and agree with the reports as stated.  Noted gas gangrene and osteomyelitis to the fifth ray     ASSESSMENT:  Left foot fifth MPJ gas gangrene  Diabetes Mellitus --> hba1c: 12.3     MEDICAL DECISION MAKING:   -Discussed all findings with patient. Chart and imaging reviewed.     -Follow up conversation had regarding patient's left foot. Plan for fifth ray resection today which will be largely left open. Will need further surgery/debridements, pending ongoing clinical improvement.     -MRI reviewed with patient showing osteomyelitis to the fifth ray.     -Discussed with patient at length need for better glycemic control.     -Also discussed possibility of needing further amputation, including up to a TMA,  pending ongoing improvement.     -Patient agrees to planned procedure.     VÍCTOR Alves Department of Podiatry/Foot & Ankle Surgery  Available via [employer]

## 2025-04-08 NOTE — ANESTHESIA PREPROCEDURE EVALUATION
Anesthesia Pre-Procedure Evaluation    Patient: Justino Giordano   MRN: [id number] : 1960        Procedure : Procedure(s):  Left foot fifth ray resection, left foot excisional debridement          Past Medical History:   Diagnosis Date    Elevated blood pressure reading without diagnosis of hypertension     Epigastric hernia     Other and unspecified hyperlipidemia       Past Surgical History:   Procedure Laterality Date    COLONOSCOPY  2013    Procedure: COLONOSCOPY;  Colonoscopy ;  Surgeon: Ash Brasher MD;  Location: [facility]    HC KNEE SCOPE,REMV LOOSE BODY      right knee, left knee scoped    HERNIORRHAPHY EPIGASTRIC  2012    Procedure: HERNIORRHAPHY EPIGASTRIC;  Epigastric Hernia repair with Mesh ;  Surgeon: Viv Alcantara DO;  Location: [facility] OR      No Known Allergies   Social History     Tobacco Use    Smoking status: Never    Smokeless tobacco: Never   Substance Use Topics    Alcohol use: No      Wt Readings from Last 1 Encounters:   25 117.2 kg (258 lb 4.8 oz)        Anesthesia Evaluation            ROS/MED HX  ENT/Pulmonary:       Neurologic:       Cardiovascular:     (+)  hypertension- -   -  - -                                      METS/Exercise Tolerance:     Hematologic: Comments: Lab Test        25                       0609          0937          WBC          14.4*        16.4*         HGB          12.9*        14.5          MCV          85           86            PLT          310          340            Lab Test        04/08/25     04/08/25     04/08/25     04/07/25     04/07/25     05/10/21     04/08/21                                    0940          0815          0609          1316          0937          1104          1020          0000          NA            --           --          128*          --          127*          --          135           --           POTASSIUM     --           --          3.3*          --          3.7           --           3.8           --           CHLORIDE      --           --          95*           --          88*           --          105           --           CO2           --           --          17*           --          20*           --          24            --           BUN           --           --          9.2           --          12.9          --          13            --           CR            --           --          0.75          --          0.79         0.89         0.86          --           ANIONGAP      --           --          16*           --          19*           --          6             --           VERNON           --           --          8.2*          --          9.1           --          8.6           --           GLC          203*         218*         206*           < >        282*         137*         192*           < >          < > = values in this interval not displayed.                       Musculoskeletal: Comment: Foot infx      GI/Hepatic:       Renal/Genitourinary:       Endo:     (+) type I DM,              Obesity,       Psychiatric/Substance Use:       Infectious Disease:       Malignancy:       Other:            Physical Exam    Airway        Mallampati: II   TM distance: > 3 FB   Neck ROM: full   Mouth opening: > 3 cm    Respiratory Devices and Support         Dental       (+) Minor Abnormalities - some fillings, tiny chips      Cardiovascular   cardiovascular exam normal          Pulmonary   pulmonary exam normal                OUTSIDE LABS:  CBC:   Lab Results   Component Value Date    WBC 14.4 (H) 04/08/2025    WBC 16.4 (H) 04/07/2025    HGB 12.9 (L) 04/08/2025    HGB 14.5 04/07/2025    HCT 36.3 (L) 04/08/2025    HCT 40.5 04/07/2025     04/08/2025     04/07/2025     BMP:   Lab Results   Component Value Date     (L) 04/08/2025     (L) 04/07/2025    POTASSIUM 3.3 (L) 04/08/2025    POTASSIUM 3.7 04/07/2025    CHLORIDE 95 (L) 04/08/2025    CHLORIDE 88 (L)  "04/07/2025    CO2 17 (L) 04/08/2025    CO2 20 (L) 04/07/2025    BUN 9.2 04/08/2025    BUN 12.9 04/07/2025    CR 0.75 04/08/2025    CR 0.79 04/07/2025     (H) 04/08/2025     (H) 04/08/2025     COAGS: No results found for: \"PTT\", \"INR\", \"FIBR\"  POC:   Lab Results   Component Value Date     (H) 12/12/2012     HEPATIC:   Lab Results   Component Value Date    ALBUMIN 3.8 (L) 03/10/2014    PROTTOTAL 7.6 03/10/2014    ALT 25 03/10/2014    AST 35 03/10/2014    ALKPHOS 94 03/10/2014    BILITOTAL 0.8 03/10/2014     OTHER:   Lab Results   Component Value Date    LACT 1.9 04/07/2025    A1C 12.3 (H) 04/07/2025    VERNON 8.2 (L) 04/08/2025    PHOS 3.0 04/08/2025    MAG 1.8 04/08/2025    TSH 1.28 11/12/2013    SED 46 (H) 04/07/2025       Anesthesia Plan    ASA Status:  3    NPO Status:  NPO Appropriate    Anesthesia Type: MAC.     - Reason for MAC: immobility needed   Induction: Propofol.   Maintenance: TIVA.        Consents    Anesthesia Plan(s) and associated risks, benefits, and realistic alternatives discussed. Questions answered and patient/representative(s) expressed understanding.     - Discussed:     - Discussed with:  Patient      - Extended Intubation/Ventilatory Support Discussed: No.      - Patient is DNR/DNI Status: No     Use of blood products discussed: No .     Postoperative Care    Pain management: IV analgesics.   PONV prophylaxis: Dexamethasone or Solumedrol, Ondansetron (or other 5HT-3)     Comments:               Bismark Zepeda MD    Clinically Significant Risk Factors        # Hypokalemia: Lowest K = 3.3 mmol/L in last 2 days, will replace as needed  # Hyponatremia: Lowest Na = 127 mmol/L in last 2 days, will monitor as appropriate  # Hypochloremia: Lowest Cl = 88 mmol/L in last 2 days, will monitor as appropriate     # Anion Gap Metabolic Acidosis: Highest Anion Gap = 19 mmol/L in last 2 days, will monitor and treat as appropriate      # Hypertension: Noted on problem list           # " "DMII: A1C = 12.3 % (Ref range: <5.7 %) within past 6 months, PRESENT ON ADMISSION  # Obesity: Estimated body mass index is 32.29 kg/m  as calculated from the following:    Height as of this encounter: 1.905 m (6' 3\").    Weight as of this encounter: 117.2 kg (258 lb 4.8 oz)., PRESENT ON ADMISSION              "

## 2025-04-08 NOTE — CONSULTS
[facility]  WO Nurse Inpatient Assessment     Consulted for: Left foot    Summary: Patient with left foot wound with plans for left foot fifth ray resection, left foot excisional debridement today.  Spoke with bedside RN who reports no other concerns.  Podiatry managing site.  WOC will sign off, re-consult if podiatry desires [facility] involvement.    Fred Schumacher RN CWOCN  Contact [facility]- [facility] Nurse ([facility])  Dept. Office Number: [phone]

## 2025-04-08 NOTE — BRIEF OP NOTE
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 Left foot fifth ray resection     2. Left foot excisional debridement down to and including tendon for site measuring 6 cm x 4 cm x 2 cm     Surgeon: Surgeons and Role:     * Denisse Preciado DPM - Primary  Anesthesia: MAC with Local   Estimated Blood Loss: 10 ml     Drains: None  Specimens:   ID Type Source Tests Collected by Time Destination   1 : Left foot 5th ray Tissue Toe, Left SURGICAL PATHOLOGY EXAM Denisse Preciado DPM 4/8/2025 12:39 PM    A : Left Foot soft tissue Tissue Foot, Left ANAEROBIC BACTERIAL CULTURE ROUTINE, GRAM STAIN, AEROBIC BACTERIAL CULTURE ROUTINE Denisse Preciado DPM 4/8/2025 12:49 PM      Findings:   Significant purulence extending from plantar to dorsal foot, extending medial towards first metatarsal.  Complications: None.  Implants: * No implants in log *

## 2025-04-08 NOTE — ANESTHESIA POSTPROCEDURE EVALUATION
Patient: Justino Giordano    Procedure: Procedure(s):  Left foot fifth ray resection, left foot excisional debridement       Anesthesia Type:  MAC    Note:  Disposition: Outpatient   Postop Pain Control: Uneventful            Sign Out: Well controlled pain   PONV: No   Neuro/Psych: Uneventful            Sign Out: Acceptable/Baseline neuro status   Airway/Respiratory: Uneventful            Sign Out: Acceptable/Baseline resp. status   CV/Hemodynamics: Uneventful            Sign Out: Acceptable CV status; No obvious hypovolemia; No obvious fluid overload   Other NRE: NONE   DID A NON-ROUTINE EVENT OCCUR? No           Last vitals:  Vitals Value Taken Time   /90 04/08/25 1340   Temp 97.4  F (36.3  C) 04/08/25 1309   Pulse 68 04/08/25 1340   Resp 18 04/08/25 1315   SpO2 98 % 04/08/25 1346   Vitals shown include unfiled device data.    Electronically Signed By: Bismark Zepeda MD  April 8, 2025  1:50 PM

## 2025-04-09 ENCOUNTER — APPOINTMENT (OUTPATIENT)
Dept: ULTRASOUND IMAGING | Facility: CLINIC | Age: 65
End: 2025-04-09
Attending: PODIATRIST
Payer: MEDICARE

## 2025-04-09 ENCOUNTER — APPOINTMENT (OUTPATIENT)
Dept: PHYSICAL THERAPY | Facility: CLINIC | Age: 65
DRG: 853 | End: 2025-04-09
Attending: INTERNAL MEDICINE
Payer: MEDICARE

## 2025-04-09 LAB
BACTERIA WND CULT: ABNORMAL
BACTERIA WND CULT: ABNORMAL
CREAT SERPL-MCNC: 0.68 MG/DL (ref 0.67–1.17)
EGFRCR SERPLBLD CKD-EPI 2021: >90 ML/MIN/1.73M2
GLUCOSE BLDC GLUCOMTR-MCNC: 184 MG/DL (ref 70–99)
GLUCOSE BLDC GLUCOMTR-MCNC: 200 MG/DL (ref 70–99)
GLUCOSE BLDC GLUCOMTR-MCNC: 207 MG/DL (ref 70–99)
GLUCOSE BLDC GLUCOMTR-MCNC: 225 MG/DL (ref 70–99)
GLUCOSE BLDC GLUCOMTR-MCNC: 233 MG/DL (ref 70–99)
MAGNESIUM SERPL-MCNC: 1.9 MG/DL (ref 1.7–2.3)
PHOSPHATE SERPL-MCNC: 3.3 MG/DL (ref 2.5–4.5)
POTASSIUM SERPL-SCNC: 3.6 MMOL/L (ref 3.4–5.3)
VANCOMYCIN SERPL-MCNC: 7.8 UG/ML

## 2025-04-09 PROCEDURE — 250N000011 HC RX IP 250 OP 636: Performed by: PHYSICIAN ASSISTANT

## 2025-04-09 PROCEDURE — 258N000003 HC RX IP 258 OP 636: Performed by: INTERNAL MEDICINE

## 2025-04-09 PROCEDURE — 36415 COLL VENOUS BLD VENIPUNCTURE: CPT | Performed by: INTERNAL MEDICINE

## 2025-04-09 PROCEDURE — 83735 ASSAY OF MAGNESIUM: CPT | Performed by: INTERNAL MEDICINE

## 2025-04-09 PROCEDURE — 97161 PT EVAL LOW COMPLEX 20 MIN: CPT | Mod: GP

## 2025-04-09 PROCEDURE — 84132 ASSAY OF SERUM POTASSIUM: CPT | Performed by: INTERNAL MEDICINE

## 2025-04-09 PROCEDURE — 99232 SBSQ HOSP IP/OBS MODERATE 35: CPT | Performed by: INTERNAL MEDICINE

## 2025-04-09 PROCEDURE — 99231 SBSQ HOSP IP/OBS SF/LOW 25: CPT | Performed by: PODIATRIST

## 2025-04-09 PROCEDURE — 80202 ASSAY OF VANCOMYCIN: CPT | Performed by: INTERNAL MEDICINE

## 2025-04-09 PROCEDURE — 93922 UPR/L XTREMITY ART 2 LEVELS: CPT

## 2025-04-09 PROCEDURE — 97116 GAIT TRAINING THERAPY: CPT | Mod: GP

## 2025-04-09 PROCEDURE — 82565 ASSAY OF CREATININE: CPT | Performed by: INTERNAL MEDICINE

## 2025-04-09 PROCEDURE — 250N000011 HC RX IP 250 OP 636: Performed by: INTERNAL MEDICINE

## 2025-04-09 PROCEDURE — 250N000013 HC RX MED GY IP 250 OP 250 PS 637: Performed by: PODIATRIST

## 2025-04-09 PROCEDURE — 84100 ASSAY OF PHOSPHORUS: CPT | Performed by: INTERNAL MEDICINE

## 2025-04-09 PROCEDURE — 120N000001 HC R&B MED SURG/OB

## 2025-04-09 PROCEDURE — 258N000003 HC RX IP 258 OP 636: Performed by: PHYSICIAN ASSISTANT

## 2025-04-09 PROCEDURE — 250N000013 HC RX MED GY IP 250 OP 250 PS 637: Performed by: INTERNAL MEDICINE

## 2025-04-09 RX ORDER — POTASSIUM CHLORIDE 1500 MG/1
20 TABLET, EXTENDED RELEASE ORAL ONCE
Status: COMPLETED | OUTPATIENT
Start: 2025-04-09 | End: 2025-04-09

## 2025-04-09 RX ORDER — LISINOPRIL 20 MG/1
20 TABLET ORAL DAILY
Status: DISCONTINUED | OUTPATIENT
Start: 2025-04-09 | End: 2025-04-19 | Stop reason: HOSPADM

## 2025-04-09 RX ORDER — MAGNESIUM OXIDE 400 MG/1
400 TABLET ORAL EVERY 4 HOURS
Status: COMPLETED | OUTPATIENT
Start: 2025-04-09 | End: 2025-04-09

## 2025-04-09 RX ADMIN — PIPERACILLIN AND TAZOBACTAM 4.5 G: 4; .5 INJECTION, POWDER, FOR SOLUTION INTRAVENOUS at 15:33

## 2025-04-09 RX ADMIN — PIPERACILLIN AND TAZOBACTAM 4.5 G: 4; .5 INJECTION, POWDER, FOR SOLUTION INTRAVENOUS at 09:17

## 2025-04-09 RX ADMIN — CLINDAMYCIN PHOSPHATE 900 MG: 900 INJECTION, SOLUTION INTRAVENOUS at 02:01

## 2025-04-09 RX ADMIN — ACETAMINOPHEN 975 MG: 325 TABLET, FILM COATED ORAL at 22:10

## 2025-04-09 RX ADMIN — POTASSIUM CHLORIDE 20 MEQ: 1500 TABLET, EXTENDED RELEASE ORAL at 09:58

## 2025-04-09 RX ADMIN — PIPERACILLIN AND TAZOBACTAM 4.5 G: 4; .5 INJECTION, POWDER, FOR SOLUTION INTRAVENOUS at 22:11

## 2025-04-09 RX ADMIN — CLINDAMYCIN PHOSPHATE 900 MG: 900 INJECTION, SOLUTION INTRAVENOUS at 20:36

## 2025-04-09 RX ADMIN — PIPERACILLIN AND TAZOBACTAM 4.5 G: 4; .5 INJECTION, POWDER, FOR SOLUTION INTRAVENOUS at 04:03

## 2025-04-09 RX ADMIN — CLINDAMYCIN PHOSPHATE 900 MG: 900 INJECTION, SOLUTION INTRAVENOUS at 11:40

## 2025-04-09 RX ADMIN — Medication 400 MG: at 13:13

## 2025-04-09 RX ADMIN — LISINOPRIL 20 MG: 20 TABLET ORAL at 09:16

## 2025-04-09 RX ADMIN — VANCOMYCIN HYDROCHLORIDE 1500 MG: 10 INJECTION, POWDER, LYOPHILIZED, FOR SOLUTION INTRAVENOUS at 17:34

## 2025-04-09 RX ADMIN — Medication 400 MG: at 09:58

## 2025-04-09 RX ADMIN — ATORVASTATIN CALCIUM 40 MG: 40 TABLET, FILM COATED ORAL at 20:33

## 2025-04-09 RX ADMIN — Medication 1500 MG: at 09:53

## 2025-04-09 RX ADMIN — ACETAMINOPHEN 975 MG: 325 TABLET, FILM COATED ORAL at 13:13

## 2025-04-09 ASSESSMENT — ACTIVITIES OF DAILY LIVING (ADL)
ADLS_ACUITY_SCORE: 33
ADLS_ACUITY_SCORE: 31
ADLS_ACUITY_SCORE: 31
ADLS_ACUITY_SCORE: 33
ADLS_ACUITY_SCORE: 31
ADLS_ACUITY_SCORE: 33
ADLS_ACUITY_SCORE: 31
ADLS_ACUITY_SCORE: 33
ADLS_ACUITY_SCORE: 31
ADLS_ACUITY_SCORE: 31
ADLS_ACUITY_SCORE: 33
ADLS_ACUITY_SCORE: 31
ADLS_ACUITY_SCORE: 33
ADLS_ACUITY_SCORE: 31
ADLS_ACUITY_SCORE: 31
ADLS_ACUITY_SCORE: 33
ADLS_ACUITY_SCORE: 33
ADLS_ACUITY_SCORE: 31

## 2025-04-09 NOTE — PLAN OF CARE
"A&O. Denies pain. , good appetite. Dressing CDI. New IV started, antibiotics continued.     Goal Outcome Evaluation:      Plan of Care Reviewed With: patient    Overall Patient Progress: improvingOverall Patient Progress: improving    Outcome Evaluation: able to ambulate      Problem: Adult Inpatient Plan of Care  Goal: Plan of Care Review  Description: The Plan of Care Review/Shift note should be completed every shift.  The Outcome Evaluation is a brief statement about your assessment that the patient is improving, declining, or no change.  This information will be displayed automatically on your shiftnote.  Outcome: Progressing  Flowsheets (Taken 4/9/2025 0729)  Outcome Evaluation: able to ambulate  Plan of Care Reviewed With: patient  Overall Patient Progress: improving  Goal: Patient-Specific Goal (Individualized)  Description: You can add care plan individualizations to a care plan. Examples of Individualization might be:  \"Parent requests to be called daily at 9am for status\", \"I have a hard time hearing out of my right ear\", or \"Do not touch me to wake me up as it startlesme\".  Outcome: Progressing  Goal: Absence of Hospital-Acquired Illness or Injury  Outcome: Progressing  Intervention: Identify and Manage Fall Risk  Recent Flowsheet Documentation  Taken 4/9/2025 1533 by Dana Menchaca, RN  Safety Promotion/Fall Prevention:   activity supervised   assistive device/personal items within reach   clutter free environment maintained   increased rounding and observation   increase visualization of patient   nonskid shoes/slippers when out of bed   patient and family education   safety round/check completed   room organization consistent  Intervention: Prevent Infection  Recent Flowsheet Documentation  Taken 4/9/2025 1533 by Dana Menchaca, RN  Infection Prevention:   personal protective equipment utilized   single patient room provided   rest/sleep promoted  Goal: Optimal Comfort and Wellbeing  Outcome: " Progressing  Goal: Readiness for Transition of Care  Outcome: Progressing     Problem: Delirium  Goal: Optimal Coping  Outcome: Progressing  Goal: Improved Behavioral Control  Outcome: Progressing  Intervention: Minimize Safety Risk  Recent Flowsheet Documentation  Taken 4/9/2025 1533 by Dana Menchaca, RN  Enhanced Safety Measures:   patient/family teach back on injury risk   pain management  Goal: Improved Attention and Thought Clarity  Outcome: Progressing  Goal: Improved Sleep  Outcome: Progressing     Problem: Comorbidity Management  Goal: Blood Glucose Levels Within Targeted Range  Outcome: Progressing  Intervention: Monitor and Manage Glycemia  Recent Flowsheet Documentation  Taken 4/9/2025 1533 by Dana Menchaca, RN  Medication Review/Management: medications reviewed

## 2025-04-09 NOTE — PROGRESS NOTES
[facility]    Medicine Progress Note - Hospitalist Service    Date of Admission:  4/7/2025    Primary Care Physician   Physician No Ref-Primary  CONSULTANTS: podiatry     Assessment & Plan   Justino Giordano is a 65 year old male with a history of HTN, DM Type 2, HLD, Obesity who presents to the ED today with a left foot injury.        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  He has not been taking medications nor caring for his diabetes. .  He was afebrile, hypertensive, HR 90, RR 20.  WBC 16.4,  with negative lactic acid.  Left Foot Xray revealed extensive soft tissue gas within the dorsal forefoot along the second through fifth metacarpal heads/necks and surrounding the fifth MTP joint, highly concerning for necrotizing soft tissue infection. There is some localized osteopenia at the medial base of the fifth proximal phalanx which may be secondary to superimposition of the soft tissue gas. No discrete osseous destructive change. Minimal scattered degenerative arthritis.  He was placed on IV fluids and made NPO.  He was empirically placed on Vancomycin, Zosyn, Clindamycin. Wound Culture with gram stain with a  polymicrobial infection with GNB, GPB, GPC, will change antibiotics once we know what we are treating with sensitivities, blood culture negative to date.  Should be able to transition to oral antibiotics soon as no signs of bacteremia and now has source control.  Podiatry consulted and patient taken on 4/8/25 for left 5th ray resection with debridement.  Op culture pending. Only to be weight bearing to heal on the left lower extremity.  Patient works as a  for [employer]. Management per podiatry, may require further surgery/wound vac/etc. Pain well  controlled, likely with severe neuropathy. Physical therapy to see to make sure he will be safe for home.    Noncompliance  Patient has not been taking any medications nor caring for his diabetes, hypertension.  This puts him at risk for readmissions, morbidity, and early mortality. We discussed the importance of taking care of his medical issues at length.      Untreated and Uncontrolled type 2 diabetes, will be insulin dependent with peripheral neuropathy  Hgb A1C 11.7 (4/2021) and was 6.6 in (2013).  BS on admission 282 with hemoglobin A1c up to 12.3.  Bicarb 20, AG 19.  Not technically DKA by criteria.  Patient denies he has a history of diabetes, although reports he has had high blood sugars in the past.  No longer takes Metformin and never started Lantus as recommended in 2021. Given the elevated A1c above 10 will need insulin.  Was started on lantus and an insulin correction scale.  Needs close primary care provider follow up with diabetic education.   Blood sugar up over 200, will increase his lantus 15 units a day     Hypertension, hyperlipidemia  -Previously on Atorvastatin, no longer takes this.  BP elevated 150-170s on arrival. , HDL 41, .  Will start on lipitor.   BP currently stable at 132/84, consider starting an ace inhibitor   - monitor bp trend,  -will add ace inhibitor given diabetes will need to follow his BMP in a few weeks with a primary care provider      Hyponatremia, Hypochloremia  Sodium when corrected for glucose is 131, chloride 88.  - IVF hydration, repeat sodium 128     Discussed plan of care with nursing, social work/case management and podiatry notes reviewed      Diet: Regular Diet Adult    DVT Prophylaxis: Pneumatic Compression Devices  Nuñez Catheter: Not present  Lines: None     Cardiac Monitoring: None    RESTRAINTS: not indicated  Code Status: Full Code      This document was created using voice recognition technology.  Please excuse any typographical errors that  "may have occurred.  Please call with any questions.        Clinically Significant Risk Factors         # Hypokalemia: Lowest K = 3.3 mmol/L in last 2 days, will replace as needed  # Hyponatremia: Lowest Na = 127 mmol/L in last 2 days, will monitor as appropriate  # Hypochloremia: Lowest Cl = 88 mmol/L in last 2 days, will monitor as appropriate     # Anion Gap Metabolic Acidosis: Highest Anion Gap = 19 mmol/L in last 2 days, will monitor and treat as appropriate      # Hypertension: Noted on problem list           # DMII: A1C = 12.3 % (Ref range: <5.7 %) within past 6 months, PRESENT ON ADMISSION  # Obesity: Estimated body mass index is 32.29 kg/m  as calculated from the following:    Height as of this encounter: 1.905 m (6' 3\").    Weight as of this encounter: 117.2 kg (258 lb 4.8 oz)., PRESENT ON ADMISSION      complicates all aspects of his care       Disposition Plan     Expected Discharge Date: 04/10/2025                  Barrier to discharge: podiatry surgery  Medically Ready for Discharge: Anticipated in 2-4 Days       Bernardo Valdivia MD  Hospitalist Service  [facility]  Securely message with [employer] (more info)  Text page via [facility] Paging/Directory   ______________________________________________________________________    Interval History   Patient with elevated blood pressure and blood sugar.  Has no pain despite having a mitch ray amputation yesterday.  Sleeping well. No new complaints.        ROS: A comprehensive review of systems was negative except for items noted in the HPI.  Patient currently denies any fever, chills, sweats, nausea, vomiting, diarrhea, shortness of breath, or chest pain.       Physical Exam   Vital Signs: Temp: 98.3  F (36.8  C) Temp src: Oral BP: (!) 148/86 Pulse: 76   Resp: 16 SpO2: 96 % O2 Device: None (Room air) Oxygen Delivery: 7 LPM  Weight: 258 lbs 4.8 oz    Exam stable from yesterday though no  longer malodorous   General: Alert, interactive, NAD, " lying in bed, appears stated age  HEENT: MMM  Chest/Resp: clear to auscultation bilaterally, no crackles or wheezes, good air movement  Heart/CV: regular rate and rhythm, no murmur  Abdomen/GI: Soft, nontender, nondistended. +BS. obese  Extremities/MSK/Skin:  left foot dressing c/d/I not removed by me  Neuro: Alert & oriented x 3, no focal deficits      Data     I have personally reviewed the following data over the past 24 hrs:    N/A  \   N/A   / N/A     N/A N/A N/A /  207 (H)   3.7 N/A N/A \       Imaging:   Results for orders placed or performed during the hospital encounter of 04/07/25   Foot XR, G/E 3 views, left    Narrative    EXAM: XR FOOT LEFT G/E 3 VIEWS  LOCATION: [facility]  DATE: 4/7/2025    INDICATION: left foot wound  COMPARISON: None.      Impression    IMPRESSION: Extensive soft tissue gas within the dorsal forefoot along the second through fifth metacarpal heads/necks and surrounding the fifth MTP joint, highly concerning for necrotizing soft tissue infection. There is some localized osteopenia at the   medial base of the fifth proximal phalanx which may be secondary to superimposition of the soft tissue gas. No discrete osseous destructive change. Minimal scattered degenerative arthritis. Plantar calcaneal spur. Benign dystrophic calcifications along   the course of the plantar fascia. Arterial calcifications.    NOTE: ABNORMAL REPORT    THE DICTATION ABOVE DESCRIBES AN ABNORMALITY FOR WHICH FOLLOW-UP IS NEEDED.    MR Foot Left w/o Contrast     Value    Radiologist flags Presumed necrotizing infection, in the left foot,    Narrative    Exam: MRI of the left forefoot and midfoot without contrast dated  4/7/2025.    COMPARISON: Radiographs from the same day.    CLINICAL HISTORY: Evaluate for osteomyelitis in patient with fifth  digit infection.    TECHNIQUE: Multiplanar, multisequence MR imaging of the left forefoot  and midfoot was obtained using standard sequences in 3  orthogonal  planes without the use of intravenous or intra-articular gadolinium  contrast.    FINDINGS:    As seen on the plain radiographs, there is extensive air within the  subcutaneous soft tissues along the dorsal aspect of the left foot,  dorsal to the third through fifth distal metatarsals, greatest along  the dorsal aspect of the distal fifth metatarsal, at the fifth  metatarsophalangeal joint. Subtle bone marrow edema in the distal  fifth metatarsal as well as in the proximal to mid aspect of the fifth  toe proximal phalanx, with no significant low T1 signal. No  significant fluid at the left fifth metatarsophalangeal joint.     Scattered degenerative changes with subchondral edema including at the  distal first metatarsophalangeal joint as well as at the navicular  cuneiform joint.    The Lisfranc ligament is intact and the Lisfranc joint is intact.    Minimal fluid in the third intermetatarsal bursa.    Mild fatty infiltration within the muscles about the foot with  extensive soft tissue edema. No discrete abscess although contrast was  not administered, mildly limiting evaluation.    The tendinous structures are grossly intact. No intermetatarsal  masses.      Impression    IMPRESSION:  1. Extensive presumed necrotizing infection within the soft tissues of  the left forefoot/midfoot with soft tissue defect seen along the  dorsal lateral aspect of the foot. There is extensive gas formation,  as seen on the radiographs, extending along the dorsal aspect of the  third through fifth metatarsals, greatest at the fifth  metatarsophalangeal joint. No significant joint effusion at the fifth  metatarsophalangeal joint to suggest septic joint. There is reactive  bone marrow edema in the distal fifth metatarsal, and in the fifth toe  proximal phalanx without definite corresponding low T1 signal,  findings presumed to represent osteitis however early osteomyelitis  cannot be excluded. Correlate with clinical  exam.    2. Diffuse soft tissue edema within the subcutaneous tissues and  within the muscles of the left forefoot/midfoot, most likely  representing a cellulitis/myositis. No discrete abscess although  contrast was not administered.    3. Degenerative changes at the navicular cuneiform joint as well as  first metatarsophalangeal joint.    [Consider Follow Up: Presumed necrotizing infection, in the left foot,  as above]    This report will be copied to the [facility] to ensure a  provider acknowledges the finding. Access Center is available Monday  through Friday 8am-3:30 pm.       BRIEN MICHAELS MD         SYSTEM ID:  [id number]     Procedures: 4/8/25 per podiatry  PROCEDURE:     1. Left foot fifth ray resection   2. Left foot excisional debridement down to and including tendon for site measuring 6 cm x 4 cm x 2 cm       I have personally have reviewed the patient's most up to labs, orders, and medications myself

## 2025-04-09 NOTE — PROGRESS NOTES
04/09/25 1411   Appointment Info   Signing Clinician's Name / Credentials (PT) KEMI Fowler   Student Supervision Therapy services provided with the co-signing licensed therapist guiding and directing the services, and providing the skilled judgement and assessment throughout the session   Rehab Comments (PT) LLE heel WB   Living Environment   People in Home alone   Current Living Arrangements condominium   Home Accessibility no concerns   Living Environment Comments pt is living in condo, elevator acess, walk in shower   Self-Care   Usual Activity Tolerance good   Current Activity Tolerance moderate   Regular Exercise No   Equipment Currently Used at Home none   Fall history within last six months no   Activity/Exercise/Self-Care Comment At baseline pt is IND w/ functional mobility and self care. Currently pt is amb is WB restriction heel only on L LE, FWW for improved amb. Pt has access to FWW and SPC if needed.   General Information   Onset of Illness/Injury or Date of Surgery 04/08/25  (L ray resection 4/8/25)   Referring Physician Bernardo Valdivia MD   Patient/Family Therapy Goals Statement (PT) Return to home   Pertinent History of Current Problem (include personal factors and/or comorbidities that impact the POC) Justino Giordano is a 65 year old male with a history of HTN, DM Type 2, HLD, Obesity who presents to the ED 4/7 with a left foot injury. L 5th ray resection 4/8.   Existing Precautions/Restrictions weight bearing  (L LE heel only)   Weight-Bearing Status - LLE   (heel WB)   Cognition   Affect/Mental Status (Cognition) WNL   Orientation Status (Cognition) oriented x 4   Follows Commands (Cognition) WNL   Pain Assessment   Patient Currently in Pain No   Integumentary/Edema   Integumentary/Edema Comments See podiatry note - L 5th ray resection, ace wrap around L foot   Posture    Posture Forward head position;Protracted shoulders   Range of Motion (ROM)   Range of Motion ROM is WFL    Strength (Manual Muscle Testing)   Strength (Manual Muscle Testing) strength is WFL   Bed Mobility   Comment, (Bed Mobility) supine <> sit IND   Transfers   Comment, (Transfers) sit <> stand SBA   Gait/Stairs (Locomotion)   Comment, (Gait/Stairs) amb w/ SBA   Balance   Balance Comments impaired: mild unsteadiness noted, reaching for UE support   Sensory Examination   Sensory Perception Comments impaired: lack of sensation on plantar aspect via neuropathy   Clinical Impression   Criteria for Skilled Therapeutic Intervention Yes, treatment indicated   PT Diagnosis (PT) impaired functional mobility   Influenced by the following impairments weakness, impaired balance, decreased mob, impaired WB   Functional limitations due to impairments difficulty w/ amb   Clinical Presentation (PT Evaluation Complexity) stable   Clinical Presentation Rationale clinical judgement   Clinical Decision Making (Complexity) low complexity   Planned Therapy Interventions (PT) balance training;gait training;home exercise program;strengthening;home program guidelines;risk factor education;progressive activity/exercise;patient/family education;transfer training   Risk & Benefits of therapy have been explained evaluation/treatment results reviewed;care plan/treatment goals reviewed;risks/benefits reviewed;participants voiced agreement with care plan;current/potential barriers reviewed;participants included;patient   PT Total Evaluation Time   PT Eval, Low Complexity Minutes (01294) 8   Physical Therapy Goals   PT Frequency 3x/week   PT Predicted Duration/Target Date for Goal Attainment 04/16/25   PT Goals Gait;Transfers   PT: Transfers Sit to/from stand;Modified independent;Assistive device;Within precautions   PT: Gait Modified independent;Assistive device;Within precautions;Greater than 200 feet   PT Discharge Planning   PT Plan progress amb distance w/ FWW vs SPC, reinforce WB precautions, AD for d/c?   PT Discharge Recommendation (DC Rec)  home with outpatient physical therapy   PT Rationale for DC Rec Pt below reported baseline. Lives alone in condo w/ elevator access. Pt currently limited by impaired balance, WB restriction, impaired amb. Recommend home w/ OP PT to progress balance and safety with IND mobility upon d/c. Pt may need AD for discharge.   PT Brief overview of current status SBA w/ FWW   PT Total Distance Amb During Session (feet) 130   PT Equipment Needed at Discharge walker, standard   Physical Therapy Time and Intention   Total Session Time (sum of timed and untimed services) 8

## 2025-04-09 NOTE — PLAN OF CARE
"Goal Outcome Evaluation:    Pertinent assessments:     A&Ox4. VSS on RA. Denies pain. LLE ace wrapped. CMS intact. SBA.  Major Shift Events: None     Treatment Plan: Symptom management, encourage ambulation, IV abx     Bedside Nurse: Jenna Randall RN        Problem: Adult Inpatient Plan of Care  Goal: Plan of Care Review  Description: The Plan of Care Review/Shift note should be completed every shift.  The Outcome Evaluation is a brief statement about your assessment that the patient is improving, declining, or no change.  This information will be displayed automatically on your shiftnote.  Outcome: Progressing  Flowsheets (Taken 4/9/2025 0506)  Overall Patient Progress: improving  Goal: Patient-Specific Goal (Individualized)  Description: You can add care plan individualizations to a care plan. Examples of Individualization might be:  \"Parent requests to be called daily at 9am for status\", \"I have a hard time hearing out of my right ear\", or \"Do not touch me to wake me up as it startlesme\".  Outcome: Progressing  Goal: Absence of Hospital-Acquired Illness or Injury  Outcome: Progressing  Intervention: Identify and Manage Fall Risk  Recent Flowsheet Documentation  Taken 4/9/2025 0045 by Jenna Randall RN  Safety Promotion/Fall Prevention: safety round/check completed  Taken 4/8/2025 2020 by Jenna Randall RN  Safety Promotion/Fall Prevention:   activity supervised   assistive device/personal items within reach   clutter free environment maintained   nonskid shoes/slippers when out of bed   patient and family education   room organization consistent   safety round/check completed  Intervention: Prevent Skin Injury  Recent Flowsheet Documentation  Taken 4/9/2025 0400 by Jenna Randall RN  Body Position: position changed independently  Taken 4/8/2025 2020 by Jenna Randall RN  Body Position: position changed independently  Intervention: Prevent Infection  Recent Flowsheet " Documentation  Taken 4/8/2025 2020 by Jenna Randall RN  Infection Prevention: rest/sleep promoted  Goal: Optimal Comfort and Wellbeing  Outcome: Progressing  Goal: Readiness for Transition of Care  Outcome: Progressing     Problem: Delirium  Goal: Optimal Coping  Outcome: Progressing  Goal: Improved Behavioral Control  Outcome: Progressing  Intervention: Minimize Safety Risk  Recent Flowsheet Documentation  Taken 4/8/2025 2020 by Jenna Randall RN  Enhanced Safety Measures:   review medications for side effects with activity   room near unit station  Goal: Improved Attention and Thought Clarity  Outcome: Progressing  Goal: Improved Sleep  Outcome: Progressing     Problem: Skin Injury Risk Increased  Goal: Skin Health and Integrity  Outcome: Progressing  Intervention: Plan: Nurse Driven Intervention: Moisture Management  Recent Flowsheet Documentation  Taken 4/8/2025 2020 by Jenna Randall RN  Moisture Interventions: Encourage regular toileting  Intervention: Optimize Skin Protection  Recent Flowsheet Documentation  Taken 4/9/2025 0400 by Jenna Randall RN  Activity Management: ambulated to bathroom  Taken 4/8/2025 2020 by Jenna Randall RN  Head of Bed (HOB) Positioning: HOB at 20 degrees     Problem: Comorbidity Management  Goal: Blood Glucose Levels Within Targeted Range  Outcome: Progressing  Intervention: Monitor and Manage Glycemia  Recent Flowsheet Documentation  Taken 4/8/2025 2020 by Jenna Randall RN  Medication Review/Management: medications reviewed     Problem: Infection  Goal: Absence of Infection Signs and Symptoms  Outcome: Progressing

## 2025-04-09 NOTE — PROGRESS NOTES
[facility]  PROGRESS NOTE    CHIEF COMPLAINT:      I was asked by Bismark Neal MD to evaluate this patient for left foot.    PATIENT HISTORY:  Justino Giordano is a 65 year old male seen in follow up for his left foot. Had fifth ray resection yesterday. States feels well. Denies pain. Denies N/F/V/C/D.     Review of Systems:  A 10 point review of systems was performed and is positive for that noted above in the patient history.  All other areas are negative.     PAST MEDICAL HISTORY:   Past Medical History:   Diagnosis Date    Elevated blood pressure reading without diagnosis of hypertension     Epigastric hernia     Other and unspecified hyperlipidemia         PAST SURGICAL HISTORY:   Past Surgical History:   Procedure Laterality Date    COLONOSCOPY  8/27/2013    Procedure: COLONOSCOPY;  Colonoscopy ;  Surgeon: Ash Brahser MD;  Location:  GI    HC KNEE SCOPE,REMV LOOSE BODY      right knee, left knee scoped    HERNIORRHAPHY EPIGASTRIC  12/12/2012    Procedure: HERNIORRHAPHY EPIGASTRIC;  Epigastric Hernia repair with Mesh ;  Surgeon: Viv Alcantara DO;  Location:  OR        MEDICATIONS:  Reviewed in Epic. Current.     ALLERGIES:  No Known Allergies     SOCIAL HISTORY:   Social History     Socioeconomic History    Marital status: Single     Spouse name: Marlene    Number of children: 0    Years of education: Not on file    Highest education level: Not on file   Occupational History     Comment: [employer] for [employer]   Tobacco Use    Smoking status: Never    Smokeless tobacco: Never   Substance and Sexual Activity    Alcohol use: No    Drug use: No    Sexual activity: Not Currently     Partners: Female   Other Topics Concern     Service Not Asked    Blood Transfusions Not Asked    Caffeine Concern Not Asked    Occupational Exposure Not Asked    Hobby Hazards Not Asked    Sleep Concern Not Asked    Stress Concern Not Asked    Weight Concern Not Asked    Special  Diet Not Asked    Back Care Not Asked    Exercise Not Asked    Bike Helmet Not Asked    Seat Belt Yes    Self-Exams Not Asked    Parent/sibling w/ CABG, MI or angioplasty before 65F 55M? Not Asked   Social History Narrative    Not on file     Social Drivers of Health     Financial Resource Strain: Low Risk  (4/7/2025)    Financial Resource Strain     Within the past 12 months, have you or your family members you live with been unable to get utilities (heat, electricity) when it was really needed?: No   Food Insecurity: Low Risk  (4/7/2025)    Food Insecurity     Within the past 12 months, did you worry that your food would run out before you got money to buy more?: No     Within the past 12 months, did the food you bought just not last and you didn t have money to get more?: No   Transportation Needs: Low Risk  (4/7/2025)    Transportation Needs     Within the past 12 months, has lack of transportation kept you from medical appointments, getting your medicines, non-medical meetings or appointments, work, or from getting things that you need?: No   Physical Activity: Not on file   Stress: Not on file   Social Connections: Not on file   Interpersonal Safety: Low Risk  (4/7/2025)    Interpersonal Safety     Do you feel physically and emotionally safe where you currently live?: Yes     Within the past 12 months, have you been hit, slapped, kicked or otherwise physically hurt by someone?: No     Within the past 12 months, have you been humiliated or emotionally abused in other ways by your partner or ex-partner?: No   Housing Stability: Low Risk  (4/7/2025)    Housing Stability     Do you have housing? : Yes     Are you worried about losing your housing?: No        FAMILY HISTORY:   Family History   Problem Relation Age of Onset    Heart Disease Father         heart attack age 59    C.A.D. Father     Heart Disease Mother         heart attack age 57    C.A.D. Mother     Diabetes Sister         EXAM:Vitals: BP (!) 148/86  "(BP Location: Left arm)   Pulse 76   Temp 98.3  F (36.8  C) (Oral)   Resp 16   Ht 1.905 m (6' 3\")   Wt 117.2 kg (258 lb 4.8 oz)   SpO2 96%   BMI 32.29 kg/m    BMI= Body mass index is 32.29 kg/m .    LABS:   .a1c  Last Comprehensive Metabolic Panel:  Sodium   Date Value Ref Range Status   04/08/2025 128 (L) 135 - 145 mmol/L Final   04/08/2021 135 133 - 144 mmol/L Final     Potassium   Date Value Ref Range Status   04/08/2025 3.7 3.4 - 5.3 mmol/L Final   04/08/2021 3.8 3.4 - 5.3 mmol/L Final     Chloride   Date Value Ref Range Status   04/08/2025 95 (L) 98 - 107 mmol/L Final   04/08/2021 105 94 - 109 mmol/L Final     Carbon Dioxide   Date Value Ref Range Status   04/08/2021 24 20 - 32 mmol/L Final     Carbon Dioxide (CO2)   Date Value Ref Range Status   04/08/2025 17 (L) 22 - 29 mmol/L Final     Anion Gap   Date Value Ref Range Status   04/08/2025 16 (H) 7 - 15 mmol/L Final   04/08/2021 6 3 - 14 mmol/L Final     Glucose   Date Value Ref Range Status   05/10/2021 137 (H) 70 - 99 mg/dL Final     Comment:     Fasting specimen     GLUCOSE BY METER POCT   Date Value Ref Range Status   04/09/2025 207 (H) 70 - 99 mg/dL Final     Comment:     Dr/RN Notified     Urea Nitrogen   Date Value Ref Range Status   04/08/2025 9.2 8.0 - 23.0 mg/dL Final   04/08/2021 13 7 - 30 mg/dL Final     Creatinine   Date Value Ref Range Status   04/08/2025 0.75 0.67 - 1.17 mg/dL Final   05/10/2021 0.89 0.66 - 1.25 mg/dL Final     GFR Estimate   Date Value Ref Range Status   04/08/2025 >90 >60 mL/min/1.73m2 Final     Comment:     eGFR calculated using 2021 CKD-EPI equation.   05/10/2021 >90 >60 mL/min/[1.73_m2] Final     Comment:     Non  GFR Calc  Starting 12/18/2018, serum creatinine based estimated GFR (eGFR) will be   calculated using the Chronic Kidney Disease Epidemiology Collaboration   (CKD-EPI) equation.       Calcium   Date Value Ref Range Status   04/08/2025 8.2 (L) 8.8 - 10.4 mg/dL Final   04/08/2021 8.6 8.5 - " "10.1 mg/dL Final     No results found for: \"WBC\"  No results found for: \"RBC\"  Lab Results   Component Value Date    HGB 15.1 01/18/2008     No results found for: \"HCT\"  No results found for: \"PLT\"   No results found for: \"INR\"     General appearance: Patient is alert and fully cooperative with history & exam.  No sign of distress is noted during the visit.      Respiratory: Breathing is regular & unlabored while sitting.      HEENT: Hearing is intact to spoken word.  Speech is clear.  No gross evidence of visual impairment that would impact ambulation.      Dermatologic: Left foot: large open ulcer laterally. Dorsal and plantar foot with hemorrhagic changes. Cellulitis and swelling much improved/resolved.      Vascular: Dorsalis pedis and posterior tibial pulses are intact & regular bilaterally.  CFT and skin temperature is normal to both lower extremities.       Neurologic: Lower extremity sensation is diminished, bilateral foot, to light touch.  No evidence of neurological-based weakness or contracture in the lower extremities.       Musculoskeletal: Patient is ambulatory without an assistive device or brace.  No gross foot or ankle deformity noted.       Psychiatric: Affect is pleasant & appropriate.      All cultures:  Recent Labs   Lab 04/08/25  1249 04/07/25  1535 04/07/25  0937   CULTURE See corresponding culture for results No anaerobic organisms isolated after 1 day  Culture in progress No growth after 1 day  No growth after 1 day        IMAGING:     IMPRESSION: Extensive soft tissue gas within the dorsal forefoot along the second through fifth metacarpal heads/necks and surrounding the fifth MTP joint, highly concerning for necrotizing soft tissue infection. There is some localized osteopenia at the   medial base of the fifth proximal phalanx which may be secondary to superimposition of the soft tissue gas. No discrete osseous destructive change. Minimal scattered degenerative arthritis. Plantar " calcaneal spur. Benign dystrophic calcifications along   the course of the plantar fascia. Arterial calcifications.    I personally reviewed the images and agree with the reports as stated.  Noted gas gangrene to fifth MPJ     Left foot MRI  IMPRESSION:  1. Extensive presumed necrotizing infection within the soft tissues of  the left forefoot/midfoot with soft tissue defect seen along the  dorsal lateral aspect of the foot. There is extensive gas formation,  as seen on the radiographs, extending along the dorsal aspect of the  third through fifth metatarsals, greatest at the fifth  metatarsophalangeal joint. No significant joint effusion at the fifth  metatarsophalangeal joint to suggest septic joint. There is reactive  bone marrow edema in the distal fifth metatarsal, and in the fifth toe  proximal phalanx without definite corresponding low T1 signal,  findings presumed to represent osteitis however early osteomyelitis  cannot be excluded. Correlate with clinical exam.     2. Diffuse soft tissue edema within the subcutaneous tissues and  within the muscles of the left forefoot/midfoot, most likely  representing a cellulitis/myositis. No discrete abscess although  contrast was not administered.     3. Degenerative changes at the navicular cuneiform joint as well as  first metatarsophalangeal joint.    I personally reviewed the images and agree with the reports as stated.  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 Chart and imaging reviewed.     -Discussed with patient intra-op findings and ongoing plan. Large amount of purulence and  necrosis. Needs further debridement and will have a large soft tissue defect. Discussed with him option/consideration for a TMA in order to have a plantar flap to close down the defect. He declines this, stating he's okay with an extending wound healing course.    -Patient with less than expected intra-op bleeding, will need vascular studies. Okay to remove bandage for exam and apply a new one afterwards.     -Will follow up with patient tomorrow, likely second surgery on 4/11.     Denisse Preciado DPM   [facility]  Available via [employer] Text

## 2025-04-09 NOTE — PLAN OF CARE
"Pertinent assessments: A&O, SBA in room, L extremity elevated while in bed. Denies pain sob or nausea, no neuropathy. Voiding well, endorsed having diarrhea overnight. Cont IV ABX.  , 225.     Major Shift Events: Replaced M and K.     Treatment Plan: Podiatry following, Monitor L foot dressing and change as directed. Cont IV ABX.     Bedside Nurse: Katiana Stein RN     Problem: Adult Inpatient Plan of Care  Goal: Plan of Care Review  Description: The Plan of Care Review/Shift note should be completed every shift.  The Outcome Evaluation is a brief statement about your assessment that the patient is improving, declining, or no change.  This information will be displayed automatically on your shiftnote.  Outcome: Progressing  Flowsheets (Taken 4/9/2025 9672)  Outcome Evaluation: Treatment Plan: Podiatry following, Monitor L foot dressing and change as directed. Cont IV ABX.  Plan of Care Reviewed With: patient  Overall Patient Progress: no change  Goal: Patient-Specific Goal (Individualized)  Description: You can add care plan individualizations to a care plan. Examples of Individualization might be:  \"Parent requests to be called daily at 9am for status\", \"I have a hard time hearing out of my right ear\", or \"Do not touch me to wake me up as it startlesme\".  Outcome: Progressing  Goal: Absence of Hospital-Acquired Illness or Injury  Outcome: Progressing  Intervention: Identify and Manage Fall Risk  Recent Flowsheet Documentation  Taken 4/9/2025 0939 by Katiana Stein RN  Safety Promotion/Fall Prevention:   activity supervised   increased rounding and observation   increase visualization of patient   nonskid shoes/slippers when out of bed   room near nurse's station  Intervention: Prevent Infection  Recent Flowsheet Documentation  Taken 4/9/2025 0939 by Katiana Stein RN  Infection Prevention: rest/sleep promoted  Goal: Optimal Comfort and Wellbeing  Outcome: Progressing  Goal: Readiness for " Transition of Care  Outcome: Progressing     Problem: Delirium  Goal: Optimal Coping  Outcome: Progressing  Goal: Improved Behavioral Control  Outcome: Progressing  Intervention: Minimize Safety Risk  Recent Flowsheet Documentation  Taken 4/9/2025 0939 by Katiana Stein RN  Enhanced Safety Measures:   review medications for side effects with activity   room near unit station  Goal: Improved Attention and Thought Clarity  Outcome: Progressing  Goal: Improved Sleep  Outcome: Progressing     Problem: Skin Injury Risk Increased  Goal: Skin Health and Integrity  Outcome: Progressing     Problem: Comorbidity Management  Goal: Blood Glucose Levels Within Targeted Range  Outcome: Progressing  Intervention: Monitor and Manage Glycemia  Recent Flowsheet Documentation  Taken 4/9/2025 0939 by Katiana Stein RN  Medication Review/Management: medications reviewed     Problem: Infection  Goal: Absence of Infection Signs and Symptoms  Outcome: Progressing   Goal Outcome Evaluation:      Plan of Care Reviewed With: patient    Overall Patient Progress: no changeOverall Patient Progress: no change    Outcome Evaluation: Treatment Plan: Podiatry following, Monitor L foot dressing and change as directed. Cont IV ABX.

## 2025-04-09 NOTE — PHARMACY-VANCOMYCIN DOSING SERVICE
"Pharmacy Vancomycin Note  Date of Service 2025  Patient's  1960   65 year old, male    Indication: Sepsis and Skin and Soft Tissue Infection  Day of Therapy: 3  Current vancomycin regimen:  1500 mg IV q12h  Current vancomycin monitoring method: AUC  Current vancomycin therapeutic monitoring goal: 400-600 mg*h/L    [employer] Prediction of Current Vancomycin Regimen    Regimen: 1500 mg IV every 12 hours.  Start time: 10:26 on 2025  Exposure target: AUC24 (range)400-600 mg/L.hr   AUC24,ss: 342 mg/L.hr  Probability of AUC24 > 400: 17 %  Ctrough,ss: 6.9 mg/L  Probability of Ctrough,ss > 20: 0 %  Probability of nephrotoxicity (Lodise STUART ): 4 %      Current estimated CrCl = Estimated Creatinine Clearance: 149.5 mL/min (based on SCr of 0.68 mg/dL).    Creatinine for last 3 days  2025:  9:37 AM Creatinine 0.79 mg/dL  2025:  6:09 AM Creatinine 0.75 mg/dL  2025:  8:21 AM Creatinine 0.68 mg/dL    Recent Vancomycin Levels (past 3 days)  2025:  8:21 AM Vancomycin 7.8 ug/mL    Vancomycin IV Administrations (past 72 hours)                     vancomycin (VANCOCIN) 1,500 mg in 0.9% NaCl 265 mL intermittent infusion (mg) 1,500 mg New Bag 25 2226     1,500 mg Given  1239     1,500 mg New Bag 25 2153    vancomycin (VANCOCIN) 2,500 mg in 0.9% NaCl 525 mL intermittent infusion (mg) 2,500 mg New Bag 25 0951                    Nephrotoxins and other renal medications (From now, onward)      Start     Dose/Rate Route Frequency Ordered Stop    25 0900  lisinopril (ZESTRIL) tablet 20 mg         20 mg Oral DAILY 25 0835      25 2200  vancomycin (VANCOCIN) 1,500 mg in 0.9% NaCl 265 mL intermittent infusion         1,500 mg  over 90 Minutes Intravenous EVERY 12 HOURS 25 1534      25 1600  piperacillin-tazobactam (ZOSYN) 4.5 g vial to attach to  mL bag        Note to Pharmacy: For [facility], [facility] and [facility]: For Zosyn-naive patients, use the \"Zosyn initial " "dose + extended infusion\" order panel.    4.5 g  over 30 Minutes Intravenous EVERY 6 HOURS 04/07/25 1146                 Contrast Orders - past 72 hours (72h ago, onward)      None            Interpretation of levels and current regimen:  Vancomycin level is reflective of AUC less than 400    Has serum creatinine changed greater than 50% in last 72 hours: No    Urine output:  unable to determine    Renal Function: Stable    [employer] Prediction of Planned New Vancomycin Regimen    Regimen: 1500 mg IV every 8 hours.  Start time: 10:26 on 04/09/2025  Exposure target: AUC24 (range)400-600 mg/L.hr   AUC24,ss: 513 mg/L.hr  Probability of AUC24 > 400: 94 %  Ctrough,ss: 13.2 mg/L  Probability of Ctrough,ss > 20: 1 %  Probability of nephrotoxicity (Lodise STUART 2009): 8 %      Plan:  Increase Dose to 1500 mg  Vancomycin monitoring method: AUC  Vancomycin therapeutic monitoring goal: 400-600 mg*h/L  Pharmacy will check vancomycin levels as appropriate in 1-3 Days.  Serum creatinine levels will be ordered daily for the first week of therapy and at least twice weekly for subsequent weeks.    Tonia Major, [facility]    "

## 2025-04-10 VITALS
WEIGHT: 258.3 LBS | HEART RATE: 71 BPM | RESPIRATION RATE: 18 BRPM | SYSTOLIC BLOOD PRESSURE: 129 MMHG | DIASTOLIC BLOOD PRESSURE: 79 MMHG | TEMPERATURE: 99.6 F | BODY MASS INDEX: 32.12 KG/M2 | OXYGEN SATURATION: 98 % | HEIGHT: 75 IN

## 2025-04-10 LAB
BACTERIA BLD CULT: NORMAL
BACTERIA BLD CULT: NORMAL
BACTERIA TISS BX CULT: ABNORMAL
BACTERIA TISS BX CULT: ABNORMAL
BACTERIA TISS BX CULT: NORMAL
BACTERIA WND CULT: ABNORMAL
BACTERIA WND CULT: ABNORMAL
CREAT SERPL-MCNC: 0.71 MG/DL (ref 0.67–1.17)
EGFRCR SERPLBLD CKD-EPI 2021: >90 ML/MIN/1.73M2
GLUCOSE BLDC GLUCOMTR-MCNC: 202 MG/DL (ref 70–99)
GLUCOSE BLDC GLUCOMTR-MCNC: 209 MG/DL (ref 70–99)
GLUCOSE BLDC GLUCOMTR-MCNC: 211 MG/DL (ref 70–99)
GLUCOSE BLDC GLUCOMTR-MCNC: 217 MG/DL (ref 70–99)
GLUCOSE BLDC GLUCOMTR-MCNC: 227 MG/DL (ref 70–99)
GLUCOSE BLDC GLUCOMTR-MCNC: 248 MG/DL (ref 70–99)
GLUCOSE BLDC GLUCOMTR-MCNC: 276 MG/DL (ref 70–99)
GRAM STAIN RESULT: ABNORMAL
MAGNESIUM SERPL-MCNC: 1.7 MG/DL (ref 1.7–2.3)
PHOSPHATE SERPL-MCNC: 3.2 MG/DL (ref 2.5–4.5)
POTASSIUM SERPL-SCNC: 3.4 MMOL/L (ref 3.4–5.3)
POTASSIUM SERPL-SCNC: 3.5 MMOL/L (ref 3.4–5.3)

## 2025-04-10 PROCEDURE — 84100 ASSAY OF PHOSPHORUS: CPT | Performed by: INTERNAL MEDICINE

## 2025-04-10 PROCEDURE — 99232 SBSQ HOSP IP/OBS MODERATE 35: CPT | Performed by: PODIATRIST

## 2025-04-10 PROCEDURE — 250N000013 HC RX MED GY IP 250 OP 250 PS 637: Performed by: INTERNAL MEDICINE

## 2025-04-10 PROCEDURE — 83735 ASSAY OF MAGNESIUM: CPT | Performed by: INTERNAL MEDICINE

## 2025-04-10 PROCEDURE — 36415 COLL VENOUS BLD VENIPUNCTURE: CPT | Performed by: INTERNAL MEDICINE

## 2025-04-10 PROCEDURE — 99232 SBSQ HOSP IP/OBS MODERATE 35: CPT | Performed by: INTERNAL MEDICINE

## 2025-04-10 PROCEDURE — L4361 PNEUMA/VAC WALK BOOT PRE OTS: HCPCS

## 2025-04-10 PROCEDURE — 82565 ASSAY OF CREATININE: CPT | Performed by: INTERNAL MEDICINE

## 2025-04-10 PROCEDURE — 258N000003 HC RX IP 258 OP 636: Performed by: INTERNAL MEDICINE

## 2025-04-10 PROCEDURE — 120N000001 HC R&B MED SURG/OB

## 2025-04-10 PROCEDURE — 250N000011 HC RX IP 250 OP 636: Performed by: PHYSICIAN ASSISTANT

## 2025-04-10 PROCEDURE — 250N000011 HC RX IP 250 OP 636: Performed by: INTERNAL MEDICINE

## 2025-04-10 PROCEDURE — 84132 ASSAY OF SERUM POTASSIUM: CPT | Performed by: INTERNAL MEDICINE

## 2025-04-10 RX ORDER — MAGNESIUM OXIDE 400 MG/1
400 TABLET ORAL EVERY 4 HOURS
Status: COMPLETED | OUTPATIENT
Start: 2025-04-10 | End: 2025-04-10

## 2025-04-10 RX ORDER — POTASSIUM CHLORIDE 1500 MG/1
20 TABLET, EXTENDED RELEASE ORAL ONCE
Status: COMPLETED | OUTPATIENT
Start: 2025-04-10 | End: 2025-04-10

## 2025-04-10 RX ORDER — POTASSIUM CHLORIDE 1500 MG/1
40 TABLET, EXTENDED RELEASE ORAL ONCE
Status: COMPLETED | OUTPATIENT
Start: 2025-04-10 | End: 2025-04-10

## 2025-04-10 RX ADMIN — CLINDAMYCIN PHOSPHATE 900 MG: 900 INJECTION, SOLUTION INTRAVENOUS at 13:26

## 2025-04-10 RX ADMIN — POTASSIUM CHLORIDE 20 MEQ: 1500 TABLET, EXTENDED RELEASE ORAL at 16:26

## 2025-04-10 RX ADMIN — CLINDAMYCIN PHOSPHATE 900 MG: 900 INJECTION, SOLUTION INTRAVENOUS at 19:53

## 2025-04-10 RX ADMIN — POTASSIUM CHLORIDE 40 MEQ: 1500 TABLET, EXTENDED RELEASE ORAL at 10:50

## 2025-04-10 RX ADMIN — PIPERACILLIN AND TAZOBACTAM 4.5 G: 4; .5 INJECTION, POWDER, FOR SOLUTION INTRAVENOUS at 04:25

## 2025-04-10 RX ADMIN — LISINOPRIL 20 MG: 20 TABLET ORAL at 09:04

## 2025-04-10 RX ADMIN — PIPERACILLIN AND TAZOBACTAM 4.5 G: 4; .5 INJECTION, POWDER, FOR SOLUTION INTRAVENOUS at 09:59

## 2025-04-10 RX ADMIN — VANCOMYCIN HYDROCHLORIDE 1500 MG: 10 INJECTION, POWDER, LYOPHILIZED, FOR SOLUTION INTRAVENOUS at 10:39

## 2025-04-10 RX ADMIN — VANCOMYCIN HYDROCHLORIDE 1500 MG: 10 INJECTION, POWDER, LYOPHILIZED, FOR SOLUTION INTRAVENOUS at 01:59

## 2025-04-10 RX ADMIN — CLINDAMYCIN PHOSPHATE 900 MG: 900 INJECTION, SOLUTION INTRAVENOUS at 05:04

## 2025-04-10 RX ADMIN — VANCOMYCIN HYDROCHLORIDE 1500 MG: 10 INJECTION, POWDER, LYOPHILIZED, FOR SOLUTION INTRAVENOUS at 18:03

## 2025-04-10 RX ADMIN — ATORVASTATIN CALCIUM 40 MG: 40 TABLET, FILM COATED ORAL at 19:53

## 2025-04-10 RX ADMIN — PIPERACILLIN AND TAZOBACTAM 4.5 G: 4; .5 INJECTION, POWDER, FOR SOLUTION INTRAVENOUS at 21:36

## 2025-04-10 RX ADMIN — PIPERACILLIN AND TAZOBACTAM 4.5 G: 4; .5 INJECTION, POWDER, FOR SOLUTION INTRAVENOUS at 15:32

## 2025-04-10 ASSESSMENT — ACTIVITIES OF DAILY LIVING (ADL)
ADLS_ACUITY_SCORE: 40
ADLS_ACUITY_SCORE: 40
ADLS_ACUITY_SCORE: 33
ADLS_ACUITY_SCORE: 37
ADLS_ACUITY_SCORE: 33
ADLS_ACUITY_SCORE: 40
ADLS_ACUITY_SCORE: 33
ADLS_ACUITY_SCORE: 33
ADLS_ACUITY_SCORE: 40
ADLS_ACUITY_SCORE: 37
ADLS_ACUITY_SCORE: 33

## 2025-04-10 NOTE — PROGRESS NOTES
[facility]    Medicine Progress Note - Hospitalist Service    Date of Admission:  4/7/2025    Primary Care Physician   Physician No Ref-Primary  CONSULTANTS: podiatry     Assessment & Plan   Justino Giordano is a 65 year old male with a history of HTN, DM Type 2, HLD, Obesity who presents to the ED today with a left foot injury.        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  He has not been taking medications nor caring for his diabetes. .  He was afebrile, hypertensive, HR 90, RR 20.  WBC 16.4,  with negative lactic acid.  Left Foot Xray revealed extensive soft tissue gas within the dorsal forefoot along the second through fifth metacarpal heads/necks and surrounding the fifth MTP joint, highly concerning for necrotizing soft tissue infection. There is some localized osteopenia at the medial base of the fifth proximal phalanx which may be secondary to superimposition of the soft tissue gas. No discrete osseous destructive change. Minimal scattered degenerative arthritis.  He was placed on IV fluids and made NPO.  He was empirically placed on Vancomycin, Zosyn, Clindamycin. Wound Culture with gram stain with a  polymicrobial infection with GNB, GPB, GPC, will change antibiotics once we know what we are treating with sensitivities, blood culture negative to date.  wound with fusobacterium species.  Should be able to transition to oral antibiotics soon as no signs of bacteremia and now has source control.  Podiatry consulted and patient taken on 4/8/25 for left 5th ray resection with debridement.  Op culture gram stain gram positive, culture pending. Only to be weight bearing to heal on the left lower extremity.  Patient works as a  for [employer]. Management  per podiatry, will require further surgery/wound debridement 4/11. Pain well controlled, likely with severe neuropathy. Physical therapy to see to make sure he will be safe for home. Recommend outpatient physical therapy.  Patient had a normal ARASELI US of the lower extremity. CAM boot ordered by podiatry    Noncompliance  Patient has not been taking any medications nor caring for his diabetes, hypertension.  This puts him at risk for readmissions, morbidity, and early mortality. We discussed the importance of taking care of his medical issues at length. Need to make medications and care as easy as possible for him so that he will be compliant.       Untreated and Uncontrolled type 2 diabetes, will be insulin dependent with peripheral neuropathy  Hgb A1C 11.7 (4/2021) and was 6.6 in (2013).  BS on admission 282 with hemoglobin A1c up to 12.3.  Bicarb 20, AG 19.  Not technically DKA by criteria.  Patient denies he has a history of diabetes, although reports he has had high blood sugars in the past.  No longer takes Metformin and never started Lantus as recommended in 2021. Given the elevated A1c above 10 will need insulin.  Was started on lantus and an insulin correction scale.  Needs close primary care provider follow up with diabetic education.   Blood sugar up over 200, will increase his lantus 120units a day, add prandial novolog 3 units with meals, and nursing to do insulin teaching as patient has not had insulin at home.      Hypertension, hyperlipidemia  -Previously on Atorvastatin, no longer takes this.  BP elevated 150-170s on arrival. , HDL 41, .  Will start on lipitor.   BP currently 110-142/54-89,   - monitor bp trend,  - ace inhibitor added 4/9.  follow his BMP and BP in a few weeks with a primary care provider      Hyponatremia, Hypochloremia  Sodium when corrected for glucose is 131, chloride 88.  - IVF hydration, repeat sodium 128  -repeat bmp in am.      Discussed plan of care with nursing,  "social work/case management and podiatry notes reviewed again      Diet: Regular Diet Adult    DVT Prophylaxis: Pneumatic Compression Devices  Nuñez Catheter: Not present  Lines: None     Cardiac Monitoring: None    RESTRAINTS: not indicated  Code Status: Full Code      This document was created using voice recognition technology.  Please excuse any typographical errors that may have occurred.  Please call with any questions.        Clinically Significant Risk Factors                    # Hypertension: Noted on problem list           # DMII: A1C = 12.3 % (Ref range: <5.7 %) within past 6 months, PRESENT ON ADMISSION  # Obesity: Estimated body mass index is 32.29 kg/m  as calculated from the following:    Height as of this encounter: 1.905 m (6' 3\").    Weight as of this encounter: 117.2 kg (258 lb 4.8 oz)., PRESENT ON ADMISSION      complicates all aspects of his care       Disposition Plan     Expected Discharge Date: 04/12/2025                  Barrier to discharge: podiatry surgery  Medically Ready for Discharge: Anticipated in 2-4 Days depends on surgical interventions.         Bernardo Valdivia MD  Hospitalist Service  [facility]  Securely message with [employer] (more info)  Text page via [employer] Paging/Directory   ______________________________________________________________________    Interval History   Patient doing fine. Pain controlled.  Is sleeping well. Planning further foot surgery tomorrow.  BS high, insulin getting titrated      ROS: A comprehensive review of systems was negative except for items noted in the HPI.  Patient currently denies any fever, chills, sweats, nausea, vomiting, diarrhea, shortness of breath, or chest pain.       Physical Exam   Vital Signs: Temp: 98.1  F (36.7  C) Temp src: Oral BP: (!) 142/89 Pulse: 74   Resp: 16 SpO2: 99 % O2 Device: None (Room air)    Weight: 258 lbs 4.8 oz    Exam stable from yesterday     General: Alert, interactive, NAD, lying in bed, " appears stated age  HEENT: MMM  Chest/Resp: clear to auscultation bilaterally, good air movement  Heart/CV: regular rate and rhythm, no murmur  Abdomen/GI: Soft, nontender, nondistended. +BS. obese  Extremities/MSK/Skin:  left foot dressing ace wrap, c/d/I not removed by me  Neuro: Alert & oriented x 3, no focal deficits      Data     I have personally reviewed the following data over the past 24 hrs:    N/A  \   N/A   / N/A     N/A N/A N/A /  217 (H)   3.6 N/A 0.68 \       Imaging:   Results for orders placed or performed during the hospital encounter of 04/07/25   Foot XR, G/E 3 views, left    Narrative    EXAM: XR FOOT LEFT G/E 3 VIEWS  LOCATION: [facility]  DATE: 4/7/2025    INDICATION: left foot wound  COMPARISON: None.      Impression    IMPRESSION: Extensive soft tissue gas within the dorsal forefoot along the second through fifth metacarpal heads/necks and surrounding the fifth MTP joint, highly concerning for necrotizing soft tissue infection. There is some localized osteopenia at the   medial base of the fifth proximal phalanx which may be secondary to superimposition of the soft tissue gas. No discrete osseous destructive change. Minimal scattered degenerative arthritis. Plantar calcaneal spur. Benign dystrophic calcifications along   the course of the plantar fascia. Arterial calcifications.    NOTE: ABNORMAL REPORT    THE DICTATION ABOVE DESCRIBES AN ABNORMALITY FOR WHICH FOLLOW-UP IS NEEDED.    MR Foot Left w/o Contrast     Value    Radiologist flags Presumed necrotizing infection, in the left foot,    Narrative    Exam: MRI of the left forefoot and midfoot without contrast dated  4/7/2025.    COMPARISON: Radiographs from the same day.    CLINICAL HISTORY: Evaluate for osteomyelitis in patient with fifth  digit infection.    TECHNIQUE: Multiplanar, multisequence MR imaging of the left forefoot  and midfoot was obtained using standard sequences in 3 orthogonal  planes without the  use of intravenous or intra-articular gadolinium  contrast.    FINDINGS:    As seen on the plain radiographs, there is extensive air within the  subcutaneous soft tissues along the dorsal aspect of the left foot,  dorsal to the third through fifth distal metatarsals, greatest along  the dorsal aspect of the distal fifth metatarsal, at the fifth  metatarsophalangeal joint. Subtle bone marrow edema in the distal  fifth metatarsal as well as in the proximal to mid aspect of the fifth  toe proximal phalanx, with no significant low T1 signal. No  significant fluid at the left fifth metatarsophalangeal joint.     Scattered degenerative changes with subchondral edema including at the  distal first metatarsophalangeal joint as well as at the navicular  cuneiform joint.    The Lisfranc ligament is intact and the Lisfranc joint is intact.    Minimal fluid in the third intermetatarsal bursa.    Mild fatty infiltration within the muscles about the foot with  extensive soft tissue edema. No discrete abscess although contrast was  not administered, mildly limiting evaluation.    The tendinous structures are grossly intact. No intermetatarsal  masses.      Impression    IMPRESSION:  1. Extensive presumed necrotizing infection within the soft tissues of  the left forefoot/midfoot with soft tissue defect seen along the  dorsal lateral aspect of the foot. There is extensive gas formation,  as seen on the radiographs, extending along the dorsal aspect of the  third through fifth metatarsals, greatest at the fifth  metatarsophalangeal joint. No significant joint effusion at the fifth  metatarsophalangeal joint to suggest septic joint. There is reactive  bone marrow edema in the distal fifth metatarsal, and in the fifth toe  proximal phalanx without definite corresponding low T1 signal,  findings presumed to represent osteitis however early osteomyelitis  cannot be excluded. Correlate with clinical exam.    2. Diffuse soft tissue edema  within the subcutaneous tissues and  within the muscles of the left forefoot/midfoot, most likely  representing a cellulitis/myositis. No discrete abscess although  contrast was not administered.    3. Degenerative changes at the navicular cuneiform joint as well as  first metatarsophalangeal joint.    [Consider Follow Up: Presumed necrotizing infection, in the left foot,  as above]    This report will be copied to the [facility] to ensure a  provider acknowledges the finding. [facility] is available Monday  through Friday 8am-3:30 pm.       BRIEN MICHAELS MD         SYSTEM ID:  [id number]     Procedures: 4/8/25 per podiatry  PROCEDURE:     1. Left foot fifth ray resection   2. Left foot excisional debridement down to and including tendon for site measuring 6 cm x 4 cm x 2 cm     Per podiatry 4/11:  debridement pending    I have personally have reviewed the patient's most up to labs, orders, and medications myself

## 2025-04-10 NOTE — PLAN OF CARE
"Goal Outcome Evaluation:      Plan of Care Reviewed With: patient    Overall Patient Progress: improvingOverall Patient Progress: improving    Outcome Evaluation: Denies pain, ambulating SBA to the BR. IV abx given.  To Do:  End of Shift Summary  For vital signs and complete assessments, please see documentation flowsheets.     Pertinent assessments: Assumed cares @ 1003-2112. A&O, SBA in room, L extremity elevated while in bed. Denies pain sob or nausea, no neuropathy. Voiding well, scheduled senna held. Pt reported having frequent stools. IV abx, Bedtime .    Major Shift Events: uneventful    Treatment Plan: Podiatry following, Monitor L foot dressing and change as directed. Cont IV ABX.     Bedside Nurse:Kwabena Mattson RN    Problem: Adult Inpatient Plan of Care  Goal: Plan of Care Review  Description: The Plan of Care Review/Shift note should be completed every shift.  The Outcome Evaluation is a brief statement about your assessment that the patient is improving, declining, or no change.  This information will be displayed automatically on your shiftnote.  Outcome: Progressing  Flowsheets (Taken 4/9/2025 2300)  Outcome Evaluation: Denies pain, ambulating SBA to the BR. IV abx given.  Plan of Care Reviewed With: patient  Overall Patient Progress: improving  Goal: Patient-Specific Goal (Individualized)  Description: You can add care plan individualizations to a care plan. Examples of Individualization might be:  \"Parent requests to be called daily at 9am for status\", \"I have a hard time hearing out of my right ear\", or \"Do not touch me to wake me up as it startlesme\".  Outcome: Progressing  Goal: Absence of Hospital-Acquired Illness or Injury  Outcome: Progressing  Intervention: Identify and Manage Fall Risk  Recent Flowsheet Documentation  Taken 4/9/2025 2246 by Kwabena Mattson, RN  Safety Promotion/Fall Prevention:   activity supervised   assistive device/personal items within reach   clutter free " environment maintained   safety round/check completed   room organization consistent  Intervention: Prevent Skin Injury  Recent Flowsheet Documentation  Taken 4/9/2025 2249 by Kwabena Mattson RN  Body Position: position changed independently  Skin Protection: adhesive use limited  Taken 4/9/2025 2100 by Kwabena Mattson RN  Body Position: position changed independently  Intervention: Prevent and Manage VTE (Venous Thromboembolism) Risk  Recent Flowsheet Documentation  Taken 4/9/2025 2249 by Kwabena Mattson RN  VTE Prevention/Management: SCDs on (sequential compression devices)  Intervention: Prevent Infection  Recent Flowsheet Documentation  Taken 4/9/2025 2249 by Kwabena Mattson RN  Infection Prevention:   personal protective equipment utilized   single patient room provided   rest/sleep promoted  Goal: Optimal Comfort and Wellbeing  Outcome: Progressing  Intervention: Monitor Pain and Promote Comfort  Recent Flowsheet Documentation  Taken 4/9/2025 2100 by Kwabena Mattson RN  Pain Management Interventions: medication (see MAR)  Intervention: Provide Person-Centered Care  Recent Flowsheet Documentation  Taken 4/9/2025 2249 by Kwabena Mattson RN  Trust Relationship/Rapport: care explained  Goal: Readiness for Transition of Care  Outcome: Progressing     Problem: Delirium  Goal: Optimal Coping  Outcome: Progressing  Goal: Improved Behavioral Control  Outcome: Progressing  Intervention: Minimize Safety Risk  Recent Flowsheet Documentation  Taken 4/9/2025 2249 by Kwabena Mattson RN  Communication Support Strategies:   active listening utilized   simple statements used  Enhanced Safety Measures:   patient/family teach back on injury risk   pain management  Trust Relationship/Rapport: care explained  Goal: Improved Attention and Thought Clarity  Outcome: Progressing  Goal: Improved Sleep  Outcome: Progressing     Problem: Skin Injury Risk Increased  Goal: Skin Health and Integrity  Outcome:  Progressing  Intervention: Plan: Nurse Driven Intervention: Moisture Management  Recent Flowsheet Documentation  Taken 4/9/2025 2249 by Kwabena Mattson RN  Moisture Interventions:   Encourage regular toileting   No brief in bed   Incontinence pad  Bathing/Skin Care: incontinence care  Taken 4/9/2025 2000 by Kwabena Mattson RN  Moisture Interventions:   Encourage regular toileting   No brief in bed   Incontinence pad  Bathing/Skin Care: incontinence care  Intervention: Optimize Skin Protection  Recent Flowsheet Documentation  Taken 4/9/2025 2249 by Kwabena Mattson RN  Skin Protection: adhesive use limited  Activity Management: activity adjusted per tolerance  Head of Bed (HOB) Positioning: HOB at 20-30 degrees  Taken 4/9/2025 2100 by Kwabena Mattson RN  Activity Management: activity adjusted per tolerance  Head of Bed (HOB) Positioning: HOB at 20-30 degrees     Problem: Comorbidity Management  Goal: Blood Glucose Levels Within Targeted Range  Outcome: Progressing  Intervention: Monitor and Manage Glycemia  Recent Flowsheet Documentation  Taken 4/9/2025 2249 by Kwabena Mattson RN  Medication Review/Management: medications reviewed     Problem: Infection  Goal: Absence of Infection Signs and Symptoms  Outcome: Progressing  Intervention: Prevent or Manage Infection  Recent Flowsheet Documentation  Taken 4/9/2025 2249 by Kwabena Mattson RN  Infection Management: aseptic technique maintained

## 2025-04-10 NOTE — PROGRESS NOTES
[facility]  PROGRESS NOTE    CHIEF COMPLAINT:      I was asked by Bismark Neal MD to evaluate this patient for left foot.    PATIENT HISTORY:  Justino Giordano is a 65 year old male seen in follow up for his left foot. Denies pain. Denies N/F/V/C/D.     Review of Systems:  A 10 point review of systems was performed and is positive for that noted above in the patient history.  All other areas are negative.     PAST MEDICAL HISTORY:   Past Medical History:   Diagnosis Date    Elevated blood pressure reading without diagnosis of hypertension     Epigastric hernia     Other and unspecified hyperlipidemia         PAST SURGICAL HISTORY:   Past Surgical History:   Procedure Laterality Date    AMPUTATE TOE(S) Left 4/8/2025    Procedure: Left foot fifth ray resection, left foot excisional debridement down to and including tendon for site measuring 6 cm x 4 cm x 2 cm;  Surgeon: Denisse Preciado DPM;  Location:  OR    COLONOSCOPY  8/27/2013    Procedure: COLONOSCOPY;  Colonoscopy ;  Surgeon: Ash Brasher MD;  Location:  GI    HC KNEE SCOPE,REMV LOOSE BODY      right knee, left knee scoped    HERNIORRHAPHY EPIGASTRIC  12/12/2012    Procedure: HERNIORRHAPHY EPIGASTRIC;  Epigastric Hernia repair with Mesh ;  Surgeon: Viv Alcantara DO;  Location:  OR        MEDICATIONS:  Reviewed in Epic. Current.     ALLERGIES:  No Known Allergies     SOCIAL HISTORY:   Social History     Socioeconomic History    Marital status: Single     Spouse name: Marlene    Number of children: 0    Years of education: Not on file    Highest education level: Not on file   Occupational History     Comment: wing and rec for [employer]   Tobacco Use    Smoking status: Never    Smokeless tobacco: Never   Substance and Sexual Activity    Alcohol use: No    Drug use: No    Sexual activity: Not Currently     Partners: Female   Other Topics Concern     Service Not Asked    Blood Transfusions Not Asked     Caffeine Concern Not Asked    Occupational Exposure Not Asked    Hobby Hazards Not Asked    Sleep Concern Not Asked    Stress Concern Not Asked    Weight Concern Not Asked    Special Diet Not Asked    Back Care Not Asked    Exercise Not Asked    Bike Helmet Not Asked    Seat Belt Yes    Self-Exams Not Asked    Parent/sibling w/ CABG, MI or angioplasty before 65F 55M? Not Asked   Social History Narrative    Not on file     Social Drivers of Health     Financial Resource Strain: Low Risk  (4/7/2025)    Financial Resource Strain     Within the past 12 months, have you or your family members you live with been unable to get utilities (heat, electricity) when it was really needed?: No   Food Insecurity: Low Risk  (4/7/2025)    Food Insecurity     Within the past 12 months, did you worry that your food would run out before you got money to buy more?: No     Within the past 12 months, did the food you bought just not last and you didn t have money to get more?: No   Transportation Needs: Low Risk  (4/7/2025)    Transportation Needs     Within the past 12 months, has lack of transportation kept you from medical appointments, getting your medicines, non-medical meetings or appointments, work, or from getting things that you need?: No   Physical Activity: Not on file   Stress: Not on file   Social Connections: Not on file   Interpersonal Safety: Low Risk  (4/7/2025)    Interpersonal Safety     Do you feel physically and emotionally safe where you currently live?: Yes     Within the past 12 months, have you been hit, slapped, kicked or otherwise physically hurt by someone?: No     Within the past 12 months, have you been humiliated or emotionally abused in other ways by your partner or ex-partner?: No   Housing Stability: Low Risk  (4/7/2025)    Housing Stability     Do you have housing? : Yes     Are you worried about losing your housing?: No        FAMILY HISTORY:   Family History   Problem Relation Age of Onset    Heart  "Disease Father         heart attack age 59    C.A.D. Father     Heart Disease Mother         heart attack age 57    C.A.D. Mother     Diabetes Sister         EXAM:Vitals: BP (!) 142/89 (BP Location: Left arm)   Pulse 74   Temp 98.1  F (36.7  C) (Oral)   Resp 16   Ht 1.905 m (6' 3\")   Wt 117.2 kg (258 lb 4.8 oz)   SpO2 99%   BMI 32.29 kg/m    BMI= Body mass index is 32.29 kg/m .    LABS:   .a1c  Last Comprehensive Metabolic Panel:  Sodium   Date Value Ref Range Status   04/08/2025 128 (L) 135 - 145 mmol/L Final   04/08/2021 135 133 - 144 mmol/L Final     Potassium   Date Value Ref Range Status   04/10/2025 3.4 3.4 - 5.3 mmol/L Final   04/08/2021 3.8 3.4 - 5.3 mmol/L Final     Chloride   Date Value Ref Range Status   04/08/2025 95 (L) 98 - 107 mmol/L Final   04/08/2021 105 94 - 109 mmol/L Final     Carbon Dioxide   Date Value Ref Range Status   04/08/2021 24 20 - 32 mmol/L Final     Carbon Dioxide (CO2)   Date Value Ref Range Status   04/08/2025 17 (L) 22 - 29 mmol/L Final     Anion Gap   Date Value Ref Range Status   04/08/2025 16 (H) 7 - 15 mmol/L Final   04/08/2021 6 3 - 14 mmol/L Final     Glucose   Date Value Ref Range Status   05/10/2021 137 (H) 70 - 99 mg/dL Final     Comment:     Fasting specimen     GLUCOSE BY METER POCT   Date Value Ref Range Status   04/10/2025 211 (H) 70 - 99 mg/dL Final     Comment:     /RN Notified     Urea Nitrogen   Date Value Ref Range Status   04/08/2025 9.2 8.0 - 23.0 mg/dL Final   04/08/2021 13 7 - 30 mg/dL Final     Creatinine   Date Value Ref Range Status   04/10/2025 0.71 0.67 - 1.17 mg/dL Final   05/10/2021 0.89 0.66 - 1.25 mg/dL Final     GFR Estimate   Date Value Ref Range Status   04/10/2025 >90 >60 mL/min/1.73m2 Final     Comment:     eGFR calculated using 2021 CKD-EPI equation.   05/10/2021 >90 >60 mL/min/[1.73_m2] Final     Comment:     Non  GFR Calc  Starting 12/18/2018, serum creatinine based estimated GFR (eGFR) will be   calculated using the " "Chronic Kidney Disease Epidemiology Collaboration   (CKD-EPI) equation.       Calcium   Date Value Ref Range Status   04/08/2025 8.2 (L) 8.8 - 10.4 mg/dL Final   04/08/2021 8.6 8.5 - 10.1 mg/dL Final     No results found for: \"WBC\"  No results found for: \"RBC\"  Lab Results   Component Value Date    HGB 15.1 01/18/2008     No results found for: \"HCT\"  No results found for: \"PLT\"   No results found for: \"INR\"     General appearance: Patient is alert and fully cooperative with history & exam.  No sign of distress is noted during the visit.      Respiratory: Breathing is regular & unlabored while sitting.      HEENT: Hearing is intact to spoken word.  Speech is clear.  No gross evidence of visual impairment that would impact ambulation.      Dermatologic: Left foot: large soft tissue defect as below, exposed bone/tendon. No ongoing purulence. Cellulitis resolved, but dorsal skin dusky.     Vascular: Dorsalis pedis and posterior tibial pulses are intact & regular bilaterally.  CFT and skin temperature is normal to both lower extremities.       Neurologic: Lower extremity sensation is diminished, bilateral foot, to light touch.  No evidence of neurological-based weakness or contracture in the lower extremities.       Musculoskeletal: Patient is ambulatory without an assistive device or brace.  No gross foot or ankle deformity noted.       Psychiatric: Affect is pleasant & appropriate.      All cultures:  Recent Labs   Lab 04/08/25  1249 04/07/25  1535 04/07/25  0937   CULTURE No anaerobic organisms isolated after 1 day  Culture in progress  See corresponding culture for results Culture in progress  4+ Normal laura  Culture in progress  2+ Fusobacterium species* No growth after 2 days  No growth after 2 days              IMAGING:     IMPRESSION: Extensive soft tissue gas within the dorsal forefoot along the second through fifth metacarpal heads/necks and surrounding the fifth MTP joint, highly concerning for " necrotizing soft tissue infection. There is some localized osteopenia at the   medial base of the fifth proximal phalanx which may be secondary to superimposition of the soft tissue gas. No discrete osseous destructive change. Minimal scattered degenerative arthritis. Plantar calcaneal spur. Benign dystrophic calcifications along   the course of the plantar fascia. Arterial calcifications.    I personally reviewed the images and agree with the reports as stated.  Noted gas gangrene to fifth MPJ     Left foot MRI  IMPRESSION:  1. Extensive presumed necrotizing infection within the soft tissues of  the left forefoot/midfoot with soft tissue defect seen along the  dorsal lateral aspect of the foot. There is extensive gas formation,  as seen on the radiographs, extending along the dorsal aspect of the  third through fifth metatarsals, greatest at the fifth  metatarsophalangeal joint. No significant joint effusion at the fifth  metatarsophalangeal joint to suggest septic joint. There is reactive  bone marrow edema in the distal fifth metatarsal, and in the fifth toe  proximal phalanx without definite corresponding low T1 signal,  findings presumed to represent osteitis however early osteomyelitis  cannot be excluded. Correlate with clinical exam.     2. Diffuse soft tissue edema within the subcutaneous tissues and  within the muscles of the left forefoot/midfoot, most likely  representing a cellulitis/myositis. No discrete abscess although  contrast was not administered.     3. Degenerative changes at the navicular cuneiform joint as well as  first metatarsophalangeal joint.    I personally reviewed the images and agree with the reports as stated.  Noted gas gangrene and osteomyelitis to the fifth ray     ABIs  ARASELI FINDINGS:  RIGHT  Brachial: 122  Ankle (PT): 177 Index: 1.45  Ankle (DP): 161 Index: 1.32     LEFT  Brachial: 122  Ankle (PT): 167 Index: 1.37  Ankle (DP): 135 Index: 1.11     Distal posterior tibial and  dorsalis pedis arteries are triphasic.                                                                   IMPRESSION:  1.  Normal ARASELI examination.    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 Chart and imaging reviewed.     -Vascular studies reviewed and appropriate. Left foot evaluated and with ongoing necrosis, likely related to infection.    -Discussed need for further debridement tomorrow. Will schedule this. Discussed with him that the soft tissue defect dorsally will be fairly large and will have a long wound healing process with use of wound vac. Possible need for further proximal resection of fifth metatarsal, pending intra-op findings.     -Will be WB to heel only. Will order CAM boot.     -NPO after midnight. Procedure scheduled for tomorrow approx 1200.     VÍCTOR Alves Department of Podiatry/Foot & Ankle Surgery  Available via [employer] Text

## 2025-04-10 NOTE — PROGRESS NOTES
I fit patient with short cam boot.  I removed once fit and left with patient.  Written, verbal and physical instruction given.  I did not ambulate patient and patient understands must be trained by P.T. or nursing prior to use.  Chelle PEDERSON.

## 2025-04-10 NOTE — PLAN OF CARE
"To Do:  End of Shift Summary  For vital signs and complete assessments, please see documentation flowsheets.     Pertinent assessments: Pt A&Ox4. Denies pain, no N/V. Dressing change this shift. K replaced. , 202, 227. PIV S/L.    Major Shift Events none    Treatment Plan: IV Abx. Wound debridement tomorrow. Monitor Left foot dressing. NPO at midnight.Insulin education started.    Bedside Nurse: Lucy Redding RN       Goal Outcome Evaluation:      Plan of Care Reviewed With: patient    Overall Patient Progress: no change    Overall Patient Progress: no change      Problem: Adult Inpatient Plan of Care  Goal: Plan of Care Review  Description: The Plan of Care Review/Shift note should be completed every shift.  The Outcome Evaluation is a brief statement about your assessment that the patient is improving, declining, or no change.  This information will be displayed automatically on your shiftnote.  4/10/2025 1456 by Lucy Redding, RN  Outcome: Progressing  Flowsheets (Taken 4/10/2025 1456)  Plan of Care Reviewed With: patient  Overall Patient Progress: no change  4/10/2025 1453 by Lucy Redding RN  Outcome: Progressing  Flowsheets (Taken 4/10/2025 1453)  Plan of Care Reviewed With: patient  Overall Patient Progress: no change  Goal: Patient-Specific Goal (Individualized)  Description: You can add care plan individualizations to a care plan. Examples of Individualization might be:  \"Parent requests to be called daily at 9am for status\", \"I have a hard time hearing out of my right ear\", or \"Do not touch me to wake me up as it startlesme\".  4/10/2025 1456 by Lucy Redding, RN  Outcome: Progressing  4/10/2025 1453 by Lucy Redding, RN  Outcome: Progressing  Goal: Absence of Hospital-Acquired Illness or Injury  4/10/2025 1456 by Lucy Redding RN  Outcome: Progressing  4/10/2025 1453 by Lucy Redding RN  Outcome: Progressing  Intervention: Identify and Manage Fall Risk  Recent Flowsheet " Documentation  Taken 4/10/2025 0810 by Lucy Redding RN  Safety Promotion/Fall Prevention:   activity supervised   increase visualization of patient   room near nurse's station  Intervention: Prevent Skin Injury  Recent Flowsheet Documentation  Taken 4/10/2025 0810 by Lucy Redding RN  Body Position: heels elevated  Intervention: Prevent Infection  Recent Flowsheet Documentation  Taken 4/10/2025 0810 by Lucy Redding RN  Infection Prevention:   cohorting utilized   hand hygiene promoted   personal protective equipment utilized   single patient room provided   rest/sleep promoted  Goal: Optimal Comfort and Wellbeing  4/10/2025 1456 by Lucy Redding RN  Outcome: Progressing  4/10/2025 1453 by Lucy Redding RN  Outcome: Progressing  Goal: Readiness for Transition of Care  4/10/2025 1456 by Lucy Redding RN  Outcome: Progressing  4/10/2025 1453 by Lucy Redding RN  Outcome: Progressing     Problem: Delirium  Goal: Optimal Coping  4/10/2025 1456 by Lucy Redding RN  Outcome: Progressing  4/10/2025 1453 by Lucy Redding RN  Outcome: Progressing  Goal: Improved Behavioral Control  4/10/2025 1456 by Lucy Redding RN  Outcome: Progressing  4/10/2025 1453 by Lucy Redding RN  Outcome: Progressing  Intervention: Minimize Safety Risk  Recent Flowsheet Documentation  Taken 4/10/2025 0810 by Lucy Redding RN  Enhanced Safety Measures: room near unit station  Goal: Improved Attention and Thought Clarity  4/10/2025 1456 by Lucy Redding RN  Outcome: Progressing  4/10/2025 1453 by Lucy Redding RN  Outcome: Progressing  Goal: Improved Sleep  4/10/2025 1456 by Lucy Redding RN  Outcome: Progressing  4/10/2025 1453 by Lucy Redding RN  Outcome: Progressing     Problem: Skin Injury Risk Increased  Goal: Skin Health and Integrity  4/10/2025 1456 by Lucy Redding RN  Outcome: Progressing  4/10/2025 1453 by Lucy Redding RN  Outcome: Progressing  Intervention: Plan:  Nurse Driven Intervention: Moisture Management  Recent Flowsheet Documentation  Taken 4/10/2025 0810 by Lucy Redding RN  Moisture Interventions: Encourage regular toileting  Taken 4/10/2025 0800 by Lucy Redding RN  Moisture Interventions: Encourage regular toileting     Problem: Comorbidity Management  Goal: Blood Glucose Levels Within Targeted Range  4/10/2025 1456 by Lucy Redding RN  Outcome: Progressing  4/10/2025 1453 by Lucy Redding RN  Outcome: Progressing     Problem: Infection  Goal: Absence of Infection Signs and Symptoms  4/10/2025 1456 by Lucy Redidng RN  Outcome: Progressing  4/10/2025 1453 by Lucy Redding RN  Outcome: Progressing  Intervention: Prevent or Manage Infection  Recent Flowsheet Documentation  Taken 4/10/2025 0810 by Lucy Redding RN  Infection Management: aseptic technique maintained

## 2025-04-10 NOTE — PLAN OF CARE
"For vital signs and complete assessments, please see documentation flowsheets.     [id number]  Pertinent assessments: Pt A&Ox4. SBA in room. On RA. Afebrile. VSS. Denies pain, nausea, & SOB. PIV saline locked. . Left foot ace wrapped CDI.     Major Shift Events: None    Treatment Plan: Pain management. Monitor BG & electrolytes. IV abx's. Dressing change. Discharge TBD.    Bedside Nurse: Zain Frazier RN     Problem: Adult Inpatient Plan of Care  Goal: Plan of Care Review  Description: The Plan of Care Review/Shift note should be completed every shift.  The Outcome Evaluation is a brief statement about your assessment that the patient is improving, declining, or no change.  This information will be displayed automatically on your shiftnote.  Outcome: Progressing  Flowsheets (Taken 4/10/2025 8476)  Outcome Evaluation: VSS. Denies pain. SBA in room. IV abx's continued.  Plan of Care Reviewed With: patient  Overall Patient Progress: improving  Goal: Patient-Specific Goal (Individualized)  Description: You can add care plan individualizations to a care plan. Examples of Individualization might be:  \"Parent requests to be called daily at 9am for status\", \"I have a hard time hearing out of my right ear\", or \"Do not touch me to wake me up as it startlesme\".  Outcome: Progressing  Goal: Absence of Hospital-Acquired Illness or Injury  Outcome: Progressing  Intervention: Identify and Manage Fall Risk  Recent Flowsheet Documentation  Taken 4/10/2025 0151 by Zain Frazier, RN  Safety Promotion/Fall Prevention:   activity supervised   assistive device/personal items within reach   clutter free environment maintained   lighting adjusted   nonskid shoes/slippers when out of bed   patient and family education   room near nurse's station   room organization consistent   safety round/check completed   treat reversible contributory factors  Intervention: Prevent Skin Injury  Recent Flowsheet Documentation  Taken 4/10/2025 " 0151 by Zain Frazier RN  Body Position: position changed independently  Intervention: Prevent Infection  Recent Flowsheet Documentation  Taken 4/10/2025 0151 by Zain Frazier RN  Infection Prevention:   cohorting utilized   hand hygiene promoted   rest/sleep promoted   single patient room provided  Goal: Optimal Comfort and Wellbeing  Outcome: Progressing  Goal: Readiness for Transition of Care  Outcome: Progressing     Problem: Delirium  Goal: Optimal Coping  Outcome: Progressing  Goal: Improved Behavioral Control  Outcome: Progressing  Intervention: Minimize Safety Risk  Recent Flowsheet Documentation  Taken 4/10/2025 0151 by Zain Frazier RN  Enhanced Safety Measures:   room near unit station   pain management  Goal: Improved Attention and Thought Clarity  Outcome: Progressing  Goal: Improved Sleep  Outcome: Progressing     Problem: Skin Injury Risk Increased  Goal: Skin Health and Integrity  Outcome: Progressing  Intervention: Optimize Skin Protection  Recent Flowsheet Documentation  Taken 4/10/2025 0151 by Zain Frazier RN  Head of Bed (HOB) Positioning: HOB at 20-30 degrees     Problem: Comorbidity Management  Goal: Blood Glucose Levels Within Targeted Range  Outcome: Progressing  Intervention: Monitor and Manage Glycemia  Recent Flowsheet Documentation  Taken 4/10/2025 0151 by Zain Frazier RN  Medication Review/Management: medications reviewed     Problem: Infection  Goal: Absence of Infection Signs and Symptoms  Outcome: Progressing     Goal Outcome Evaluation:      Plan of Care Reviewed With: patient    Overall Patient Progress: improvingOverall Patient Progress: improving    Outcome Evaluation: VSS. Denies pain. SBA in room. IV abx's continued.

## 2025-04-11 LAB
ANION GAP SERPL CALCULATED.3IONS-SCNC: 11 MMOL/L (ref 7–15)
BACTERIA WND CULT: ABNORMAL
BUN SERPL-MCNC: 4.5 MG/DL (ref 8–23)
CALCIUM SERPL-MCNC: 8.1 MG/DL (ref 8.8–10.4)
CHLORIDE SERPL-SCNC: 99 MMOL/L (ref 98–107)
CREAT SERPL-MCNC: 0.73 MG/DL (ref 0.67–1.17)
CRP SERPL-MCNC: 143.96 MG/L
EGFRCR SERPLBLD CKD-EPI 2021: >90 ML/MIN/1.73M2
GLUCOSE BLDC GLUCOMTR-MCNC: 179 MG/DL (ref 70–99)
GLUCOSE BLDC GLUCOMTR-MCNC: 194 MG/DL (ref 70–99)
GLUCOSE BLDC GLUCOMTR-MCNC: 195 MG/DL (ref 70–99)
GLUCOSE BLDC GLUCOMTR-MCNC: 197 MG/DL (ref 70–99)
GLUCOSE BLDC GLUCOMTR-MCNC: 202 MG/DL (ref 70–99)
GLUCOSE BLDC GLUCOMTR-MCNC: 202 MG/DL (ref 70–99)
GLUCOSE SERPL-MCNC: 198 MG/DL (ref 70–99)
GRAM STAIN RESULT: ABNORMAL
HCO3 SERPL-SCNC: 22 MMOL/L (ref 22–29)
MAGNESIUM SERPL-MCNC: 1.8 MG/DL (ref 1.7–2.3)
PHOSPHATE SERPL-MCNC: 3.5 MG/DL (ref 2.5–4.5)
POTASSIUM SERPL-SCNC: 3.5 MMOL/L (ref 3.4–5.3)
SODIUM SERPL-SCNC: 132 MMOL/L (ref 135–145)
VANCOMYCIN SERPL-MCNC: 13.5 UG/ML

## 2025-04-11 PROCEDURE — 99232 SBSQ HOSP IP/OBS MODERATE 35: CPT | Performed by: INTERNAL MEDICINE

## 2025-04-11 PROCEDURE — 360N000076 HC SURGERY LEVEL 3, PER MIN: Performed by: PODIATRIST

## 2025-04-11 PROCEDURE — 999N000141 HC STATISTIC PRE-PROCEDURE NURSING ASSESSMENT: Performed by: PODIATRIST

## 2025-04-11 PROCEDURE — 86140 C-REACTIVE PROTEIN: CPT | Performed by: PODIATRIST

## 2025-04-11 PROCEDURE — 250N000011 HC RX IP 250 OP 636: Performed by: INTERNAL MEDICINE

## 2025-04-11 PROCEDURE — 250N000009 HC RX 250: Performed by: PODIATRIST

## 2025-04-11 PROCEDURE — 0Y6N0ZF DETACHMENT AT LEFT FOOT, PARTIAL 5TH RAY, OPEN APPROACH: ICD-10-PCS | Performed by: PODIATRIST

## 2025-04-11 PROCEDURE — 80202 ASSAY OF VANCOMYCIN: CPT | Performed by: INTERNAL MEDICINE

## 2025-04-11 PROCEDURE — 84100 ASSAY OF PHOSPHORUS: CPT | Performed by: INTERNAL MEDICINE

## 2025-04-11 PROCEDURE — 272N000001 HC OR GENERAL SUPPLY STERILE: Performed by: PODIATRIST

## 2025-04-11 PROCEDURE — 250N000011 HC RX IP 250 OP 636: Mod: JW | Performed by: PODIATRIST

## 2025-04-11 PROCEDURE — 710N000012 HC RECOVERY PHASE 2, PER MINUTE: Performed by: PODIATRIST

## 2025-04-11 PROCEDURE — 250N000013 HC RX MED GY IP 250 OP 250 PS 637: Performed by: INTERNAL MEDICINE

## 2025-04-11 PROCEDURE — 28122 PARTIAL REMOVAL OF FOOT BONE: CPT | Mod: T8 | Performed by: PODIATRIST

## 2025-04-11 PROCEDURE — 83735 ASSAY OF MAGNESIUM: CPT | Performed by: INTERNAL MEDICINE

## 2025-04-11 PROCEDURE — 0LBW0ZZ EXCISION OF LEFT FOOT TENDON, OPEN APPROACH: ICD-10-PCS | Performed by: PODIATRIST

## 2025-04-11 PROCEDURE — 258N000003 HC RX IP 258 OP 636: Performed by: ANESTHESIOLOGY

## 2025-04-11 PROCEDURE — 258N000003 HC RX IP 258 OP 636: Performed by: INTERNAL MEDICINE

## 2025-04-11 PROCEDURE — 120N000001 HC R&B MED SURG/OB

## 2025-04-11 PROCEDURE — 370N000017 HC ANESTHESIA TECHNICAL FEE, PER MIN: Performed by: PODIATRIST

## 2025-04-11 PROCEDURE — 88311 DECALCIFY TISSUE: CPT | Mod: TC | Performed by: PODIATRIST

## 2025-04-11 PROCEDURE — 250N000011 HC RX IP 250 OP 636: Performed by: PHYSICIAN ASSISTANT

## 2025-04-11 PROCEDURE — 36415 COLL VENOUS BLD VENIPUNCTURE: CPT | Performed by: INTERNAL MEDICINE

## 2025-04-11 PROCEDURE — 80051 ELECTROLYTE PANEL: CPT | Performed by: INTERNAL MEDICINE

## 2025-04-11 RX ORDER — AMOXICILLIN 250 MG
1 CAPSULE ORAL 2 TIMES DAILY
Status: DISCONTINUED | OUTPATIENT
Start: 2025-04-11 | End: 2025-04-11

## 2025-04-11 RX ORDER — ACETAMINOPHEN 325 MG/1
975 TABLET ORAL
Status: DISCONTINUED | OUTPATIENT
Start: 2025-04-11 | End: 2025-04-11 | Stop reason: HOSPADM

## 2025-04-11 RX ORDER — ONDANSETRON 2 MG/ML
4 INJECTION INTRAMUSCULAR; INTRAVENOUS EVERY 30 MIN PRN
Status: DISCONTINUED | OUTPATIENT
Start: 2025-04-11 | End: 2025-04-11 | Stop reason: HOSPADM

## 2025-04-11 RX ORDER — POLYETHYLENE GLYCOL 3350 17 G/17G
17 POWDER, FOR SOLUTION ORAL DAILY
Status: DISCONTINUED | OUTPATIENT
Start: 2025-04-12 | End: 2025-04-11

## 2025-04-11 RX ORDER — LIDOCAINE 40 MG/G
CREAM TOPICAL
Status: DISCONTINUED | OUTPATIENT
Start: 2025-04-11 | End: 2025-04-11 | Stop reason: HOSPADM

## 2025-04-11 RX ORDER — MAGNESIUM HYDROXIDE 1200 MG/15ML
LIQUID ORAL PRN
Status: DISCONTINUED | OUTPATIENT
Start: 2025-04-11 | End: 2025-04-11 | Stop reason: HOSPADM

## 2025-04-11 RX ORDER — LIDOCAINE 40 MG/G
CREAM TOPICAL
Status: DISCONTINUED | OUTPATIENT
Start: 2025-04-11 | End: 2025-04-17

## 2025-04-11 RX ORDER — BISACODYL 10 MG
10 SUPPOSITORY, RECTAL RECTAL DAILY PRN
Status: DISCONTINUED | OUTPATIENT
Start: 2025-04-14 | End: 2025-04-11

## 2025-04-11 RX ORDER — ONDANSETRON 4 MG/1
4 TABLET, ORALLY DISINTEGRATING ORAL EVERY 30 MIN PRN
Status: DISCONTINUED | OUTPATIENT
Start: 2025-04-11 | End: 2025-04-11 | Stop reason: HOSPADM

## 2025-04-11 RX ORDER — OXYCODONE HYDROCHLORIDE 5 MG/1
5 TABLET ORAL
Status: DISCONTINUED | OUTPATIENT
Start: 2025-04-11 | End: 2025-04-11 | Stop reason: HOSPADM

## 2025-04-11 RX ORDER — BUPIVACAINE HYDROCHLORIDE 5 MG/ML
INJECTION, SOLUTION EPIDURAL; INTRACAUDAL; PERINEURAL PRN
Status: DISCONTINUED | OUTPATIENT
Start: 2025-04-11 | End: 2025-04-11 | Stop reason: HOSPADM

## 2025-04-11 RX ORDER — DEXAMETHASONE SODIUM PHOSPHATE 4 MG/ML
4 INJECTION, SOLUTION INTRA-ARTICULAR; INTRALESIONAL; INTRAMUSCULAR; INTRAVENOUS; SOFT TISSUE
Status: DISCONTINUED | OUTPATIENT
Start: 2025-04-11 | End: 2025-04-11 | Stop reason: HOSPADM

## 2025-04-11 RX ORDER — MAGNESIUM SULFATE HEPTAHYDRATE 40 MG/ML
2 INJECTION, SOLUTION INTRAVENOUS ONCE
Status: COMPLETED | OUTPATIENT
Start: 2025-04-11 | End: 2025-04-11

## 2025-04-11 RX ORDER — NALOXONE HYDROCHLORIDE 0.4 MG/ML
0.1 INJECTION, SOLUTION INTRAMUSCULAR; INTRAVENOUS; SUBCUTANEOUS
Status: DISCONTINUED | OUTPATIENT
Start: 2025-04-11 | End: 2025-04-11 | Stop reason: HOSPADM

## 2025-04-11 RX ORDER — ACETAMINOPHEN 325 MG/1
975 TABLET ORAL EVERY 8 HOURS
Status: DISCONTINUED | OUTPATIENT
Start: 2025-04-11 | End: 2025-04-11

## 2025-04-11 RX ORDER — POTASSIUM CHLORIDE 1500 MG/1
20 TABLET, EXTENDED RELEASE ORAL ONCE
Status: COMPLETED | OUTPATIENT
Start: 2025-04-11 | End: 2025-04-11

## 2025-04-11 RX ORDER — SODIUM CHLORIDE, SODIUM LACTATE, POTASSIUM CHLORIDE, CALCIUM CHLORIDE 600; 310; 30; 20 MG/100ML; MG/100ML; MG/100ML; MG/100ML
INJECTION, SOLUTION INTRAVENOUS CONTINUOUS
Status: DISCONTINUED | OUTPATIENT
Start: 2025-04-11 | End: 2025-04-11 | Stop reason: HOSPADM

## 2025-04-11 RX ADMIN — LISINOPRIL 20 MG: 20 TABLET ORAL at 08:27

## 2025-04-11 RX ADMIN — VANCOMYCIN HYDROCHLORIDE 1500 MG: 10 INJECTION, POWDER, LYOPHILIZED, FOR SOLUTION INTRAVENOUS at 03:24

## 2025-04-11 RX ADMIN — ATORVASTATIN CALCIUM 40 MG: 40 TABLET, FILM COATED ORAL at 22:18

## 2025-04-11 RX ADMIN — CLINDAMYCIN PHOSPHATE 900 MG: 900 INJECTION, SOLUTION INTRAVENOUS at 12:16

## 2025-04-11 RX ADMIN — POTASSIUM CHLORIDE 20 MEQ: 1500 TABLET, EXTENDED RELEASE ORAL at 12:14

## 2025-04-11 RX ADMIN — SODIUM CHLORIDE, SODIUM LACTATE, POTASSIUM CHLORIDE, AND CALCIUM CHLORIDE: .6; .31; .03; .02 INJECTION, SOLUTION INTRAVENOUS at 09:31

## 2025-04-11 RX ADMIN — CLINDAMYCIN PHOSPHATE 900 MG: 900 INJECTION, SOLUTION INTRAVENOUS at 22:21

## 2025-04-11 RX ADMIN — PIPERACILLIN AND TAZOBACTAM 4.5 G: 4; .5 INJECTION, POWDER, FOR SOLUTION INTRAVENOUS at 04:03

## 2025-04-11 RX ADMIN — CLINDAMYCIN PHOSPHATE 900 MG: 900 INJECTION, SOLUTION INTRAVENOUS at 05:00

## 2025-04-11 ASSESSMENT — ACTIVITIES OF DAILY LIVING (ADL)
ADLS_ACUITY_SCORE: 37

## 2025-04-11 NOTE — PHARMACY-VANCOMYCIN DOSING SERVICE
Pharmacy Vancomycin Note  Date of Service 2025  Patient's  1960   65 year old, male    Indication: Skin and Soft Tissue Infection  Day of Therapy: 5  Current vancomycin regimen:  1500 mg IV q8h  Current vancomycin monitoring method: AUC  Current vancomycin therapeutic monitoring goal: 400-600 mg*h/L    Insight[employer] Prediction of Current Vancomycin Regimen  Regimen: 1500 mg IV every 12 hours.  Start time: 10:26 on 2025  Exposure target: AUC24 (range)400-600 mg/L.hr   AUC24,ss: 342 mg/L.hr  Probability of AUC24 > 400: 17 %  Ctrough,ss: 6.9 mg/L  Probability of Ctrough,ss > 20: 0 %  Probability of nephrotoxicity (Lodise STUART ): 4 %    Current estimated CrCl = Estimated Creatinine Clearance: 143.2 mL/min (based on SCr of 0.71 mg/dL).    Creatinine for last 3 days  2025:  6:09 AM Creatinine 0.75 mg/dL  2025:  8:21 AM Creatinine 0.68 mg/dL  4/10/2025:  7:44 AM Creatinine 0.71 mg/dL    Recent Vancomycin Levels (past 3 days)  2025:  8:21 AM Vancomycin 7.8 ug/mL  2025:  2:15 AM Vancomycin 13.5 ug/mL    Vancomycin IV Administrations (past 72 hours)                     vancomycin (VANCOCIN) 1,500 mg in 0.9% NaCl 265 mL intermittent infusion (mg) 1,500 mg New Bag 04/10/25 1803     1,500 mg New Bag  1039     1,500 mg New Bag  0159     1,500 mg New Bag 25 1734    vancomycin (VANCOCIN) 1,500 mg in 0.9% NaCl 265 mL intermittent infusion (mg) 1,500 mg New Bag 25 0953     1,500 mg New Bag 25 2226     1,500 mg Given  1239                    Nephrotoxins and other renal medications (From now, onward)      Start     Dose/Rate Route Frequency Ordered Stop    25 1800  vancomycin (VANCOCIN) 1,500 mg in 0.9% NaCl 265 mL intermittent infusion         1,500 mg  over 90 Minutes Intravenous EVERY 8 HOURS 25 1122      25 0900  lisinopril (ZESTRIL) tablet 20 mg         20 mg Oral DAILY 25 0835      25 1600  piperacillin-tazobactam (ZOSYN) 4.5 g vial to attach  "to  mL bag        Note to Pharmacy: For [facility], [facility] and [facility]: For Zosyn-naive patients, use the \"Zosyn initial dose + extended infusion\" order panel.    4.5 g  over 30 Minutes Intravenous EVERY 6 HOURS 04/07/25 1146                 Contrast Orders - past 72 hours (72h ago, onward)      None            Interpretation of levels and current regimen:  Vancomycin level is reflective of -600 and trough level 4/11 AM 13.5    Has serum creatinine changed greater than 50% in last 72 hours: No    Urine output:  good urine output    Renal Function: Stable    [employer] Prediction of Planned New Vancomycin Regimen  Loading dose: N/A  Regimen: 1500 mg IV every 8 hours.  Start time: 03:05 on 04/11/2025  Exposure target: AUC24 (range)400-600 mg/L.hr   AUC24,ss: 517 mg/L.hr  Probability of AUC24 > 400: 96 %  Ctrough,ss: 13.4 mg/L  Probability of Ctrough,ss > 20: 0 %  Probability of nephrotoxicity (Lodise STUART 2009): 9 %      Plan:  Continue Current Dose  Vancomycin monitoring method: AUC  Vancomycin therapeutic monitoring goal: 400-600 mg*h/L  Pharmacy will check vancomycin levels as appropriate in 1-3 Days.  Serum creatinine levels will be ordered daily for the first week of therapy and at least twice weekly for subsequent weeks.    Conor Selby [facility]   "

## 2025-04-11 NOTE — PROGRESS NOTES
[facility]    Medicine Progress Note - Hospitalist Service    Date of Admission:  4/7/2025    Primary Care Physician   Physician No Ref-Primary  CONSULTANTS: podiatry     Assessment & Plan   Justino Giordano is a 65 year old male with a history of HTN, DM Type 2, HLD, Obesity who presents to the ED today with a left foot injury.        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  He has not been taking medications nor caring for his diabetes. .  He was afebrile, hypertensive, HR 90, RR 20.  WBC 16.4,  with negative lactic acid.  Left Foot Xray revealed extensive soft tissue gas within the dorsal forefoot along the second through fifth metacarpal heads/necks and surrounding the fifth MTP joint, highly concerning for necrotizing soft tissue infection. There is some localized osteopenia at the medial base of the fifth proximal phalanx which may be secondary to superimposition of the soft tissue gas. No discrete osseous destructive change. Minimal scattered degenerative arthritis.  He was placed on IV fluids and made NPO.  He was empirically placed on Vancomycin, Zosyn, Clindamycin. Wound Culture with gram stain with a  polymicrobial infection with culture showing laura and fusobacterium on admission and from surgery.  Will discharge zosyn/vanco and continue on clindamycin.   Should be able to transition to oral antibiotics soon as no signs of bacteremia and now has source control.  Podiatry consulted and patient taken on 4/8/25 for left 5th ray resection with debridement.    Only to be weight bearing to heal on the left lower extremity, Cam boot per podiatry.  Patient works as a  for [email or web address]. Patient going to the OR for more debridement today 4/11, likely to need  wound vac.   . Pain well controlled, likely with severe neuropathy. Physical therapy to see to make sure he will be safe for home. Recommend outpatient physical therapy.  Patient had a normal ARASELI US of the lower extremity.      Noncompliance  Patient has not been taking any medications nor caring for his diabetes, hypertension.  This puts him at risk for readmissions, morbidity, and early mortality. We discussed the importance of taking care of his medical issues at length. Need to make medications and care as easy as possible for him so that he will be compliant.       Untreated and Uncontrolled type 2 diabetes, will be insulin dependent with peripheral neuropathy  Hgb A1C 11.7 (4/2021) and was 6.6 in (2013).  BS on admission 282 with hemoglobin A1c up to 12.3.  Bicarb 20, AG 19.  Not technically DKA by criteria.  Patient denies he has a history of diabetes, although reports he has had high blood sugars in the past.  No longer takes Metformin and never started Lantus as recommended in 2021. Given the elevated A1c above 10 will need insulin.  Was started on lantus and an insulin correction scale.  Needs close primary care provider follow up with diabetic education.   Blood sugar around 200, currently NPO.  No changes in insulin today, continue lantus/prandial insulin with correction scale.          Hypertension, hyperlipidemia  -Previously on Atorvastatin, no longer takes this.  BP elevated 150-170s on arrival. , HDL 41, .  Will start on lipitor.   BP currently  129-148/79-96,   - monitor bp trend,  - ace inhibitor added 4/9.  follow his BMP and BP in a few weeks with a primary care provider      Hyponatremia, Hypochloremia  Sodium when corrected for glucose is 131, chloride 88.  - IVF hydration, repeat sodium 128  -repeat bmp 4/11, sodium up to 132, creatinine normal       Discussed plan of care with nursing, social work/case management and podiatry notes reviewed again      Diet: NPO for  "Procedure/Surgery per Anesthesia Guidelines Except for: Meds; Clear liquids before procedure/surgery: ADULT (Age GREATER than or Equal to 18 years) - Clear liquids 2 hours before procedure/surgery    DVT Prophylaxis: Pneumatic Compression Devices  Nuñez Catheter: Not present  Lines: None     Cardiac Monitoring: None    RESTRAINTS: not indicated  Code Status: Full Code      This document was created using voice recognition technology.  Please excuse any typographical errors that may have occurred.  Please call with any questions.        Clinically Significant Risk Factors          # Hyponatremia: Lowest Na = 132 mmol/L in last 2 days, will monitor as appropriate           # Hypertension: Noted on problem list           # DMII: A1C = 12.3 % (Ref range: <5.7 %) within past 6 months, PRESENT ON ADMISSION  # Obesity: Estimated body mass index is 32.29 kg/m  as calculated from the following:    Height as of this encounter: 1.905 m (6' 3\").    Weight as of this encounter: 117.2 kg (258 lb 4.8 oz)., PRESENT ON ADMISSION      complicates all aspects of his care       Disposition Plan     Expected Discharge Date: 04/12/2025                  Barrier to discharge: podiatry surgery  Medically Ready for Discharge: Anticipated in 2-4 Days depends on surgical interventions.  Wound vac, may need placement depending on wound care that is needed       Bernardo Valdivia MD  Hospitalist Service  [facility]  Securely message with [employer] (more info)  Text page via [facility] Paging/Directory   ______________________________________________________________________    Interval History   Patient without any changes.  Planning more surgery today.  Culture only showing fusobcterium and laura.  No f/c/s/n/v/d      ROS: A comprehensive review of systems was negative except for items noted in the HPI.  Patient currently denies any fever, chills, sweats, nausea, vomiting, diarrhea, shortness of breath, or chest pain.   "     Physical Exam   Vital Signs: Temp: 98.5  F (36.9  C) Temp src: Oral BP: (!) 148/96 Pulse: 86   Resp: 18 SpO2: 99 % O2 Device: None (Room air)    Weight: 258 lbs 4.8 oz    Exam stable from yesterday     General: Alert, interactive, NAD, lying in bed, appears stated age  HEENT: MMM  Chest/Resp: clear to auscultation bilaterally, good air movement  Heart/CV: regular rate and rhythm, no murmur  Abdomen/GI: Soft, nontender, nondistended. +BS. obese  Extremities/MSK/Skin:  left foot dressing ace wrap, c/d/I not removed by me  Neuro: Alert & oriented x 3, no focal deficits      Data     I have personally reviewed the following data over the past 24 hrs:    N/A  \   N/A   / N/A     132 (L) 99 4.5 (L) /  198 (H)   3.5 22 0.73 \       Imaging:   Results for orders placed or performed during the hospital encounter of 04/07/25   Foot XR, G/E 3 views, left    Narrative    EXAM: XR FOOT LEFT G/E 3 VIEWS  LOCATION: [facility]  DATE: 4/7/2025    INDICATION: left foot wound  COMPARISON: None.      Impression    IMPRESSION: Extensive soft tissue gas within the dorsal forefoot along the second through fifth metacarpal heads/necks and surrounding the fifth MTP joint, highly concerning for necrotizing soft tissue infection. There is some localized osteopenia at the   medial base of the fifth proximal phalanx which may be secondary to superimposition of the soft tissue gas. No discrete osseous destructive change. Minimal scattered degenerative arthritis. Plantar calcaneal spur. Benign dystrophic calcifications along   the course of the plantar fascia. Arterial calcifications.    NOTE: ABNORMAL REPORT    THE DICTATION ABOVE DESCRIBES AN ABNORMALITY FOR WHICH FOLLOW-UP IS NEEDED.    MR Foot Left w/o Contrast     Value    Radiologist flags Presumed necrotizing infection, in the left foot,    Narrative    Exam: MRI of the left forefoot and midfoot without contrast dated  4/7/2025.    COMPARISON: Radiographs from  the same day.    CLINICAL HISTORY: Evaluate for osteomyelitis in patient with fifth  digit infection.    TECHNIQUE: Multiplanar, multisequence MR imaging of the left forefoot  and midfoot was obtained using standard sequences in 3 orthogonal  planes without the use of intravenous or intra-articular gadolinium  contrast.    FINDINGS:    As seen on the plain radiographs, there is extensive air within the  subcutaneous soft tissues along the dorsal aspect of the left foot,  dorsal to the third through fifth distal metatarsals, greatest along  the dorsal aspect of the distal fifth metatarsal, at the fifth  metatarsophalangeal joint. Subtle bone marrow edema in the distal  fifth metatarsal as well as in the proximal to mid aspect of the fifth  toe proximal phalanx, with no significant low T1 signal. No  significant fluid at the left fifth metatarsophalangeal joint.     Scattered degenerative changes with subchondral edema including at the  distal first metatarsophalangeal joint as well as at the navicular  cuneiform joint.    The Lisfranc ligament is intact and the Lisfranc joint is intact.    Minimal fluid in the third intermetatarsal bursa.    Mild fatty infiltration within the muscles about the foot with  extensive soft tissue edema. No discrete abscess although contrast was  not administered, mildly limiting evaluation.    The tendinous structures are grossly intact. No intermetatarsal  masses.      Impression    IMPRESSION:  1. Extensive presumed necrotizing infection within the soft tissues of  the left forefoot/midfoot with soft tissue defect seen along the  dorsal lateral aspect of the foot. There is extensive gas formation,  as seen on the radiographs, extending along the dorsal aspect of the  third through fifth metatarsals, greatest at the fifth  metatarsophalangeal joint. No significant joint effusion at the fifth  metatarsophalangeal joint to suggest septic joint. There is reactive  bone marrow edema in the  distal fifth metatarsal, and in the fifth toe  proximal phalanx without definite corresponding low T1 signal,  findings presumed to represent osteitis however early osteomyelitis  cannot be excluded. Correlate with clinical exam.    2. Diffuse soft tissue edema within the subcutaneous tissues and  within the muscles of the left forefoot/midfoot, most likely  representing a cellulitis/myositis. No discrete abscess although  contrast was not administered.    3. Degenerative changes at the navicular cuneiform joint as well as  first metatarsophalangeal joint.    [Consider Follow Up: Presumed necrotizing infection, in the left foot,  as above]    This report will be copied to the [facility] to ensure a  provider acknowledges the finding. [facility] Center is available Monday  through Friday 8am-3:30 pm.       BRIEN MICHAELS MD         SYSTEM ID:  [id number]     Procedures: 4/8/25 per podiatry  PROCEDURE:     1. Left foot fifth ray resection   2. Left foot excisional debridement down to and including tendon for site measuring 6 cm x 4 cm x 2 cm     Per podiatry 4/11:  debridement pending    I have personally have reviewed the patient's most up to labs, orders, and medications myself

## 2025-04-11 NOTE — PLAN OF CARE
"Pertinent assessments: A&O, Up sba in room. Pt denies pain sob or nausea.  and 195. Cont IV ABX. Mod carb diet.     Major Shift Events POD 0 debridement, procedure went well pt denies pain or nausea post op.     Treatment Plan: Awaiting podiatry rec.     Bedside Nurse: Katiana Stein RN     Problem: Pain Acute  Goal: Optimal Pain Control and Function  Outcome: Progressing  Intervention: Prevent or Manage Pain  Recent Flowsheet Documentation  Taken 4/11/2025 1300 by Katiana Stein, RN  Medication Review/Management: medications reviewed     Problem: Adult Inpatient Plan of Care  Goal: Plan of Care Review  Description: The Plan of Care Review/Shift note should be completed every shift.  The Outcome Evaluation is a brief statement about your assessment that the patient is improving, declining, or no change.  This information will be displayed automatically on your shiftnote.  Outcome: Progressing  Flowsheets (Taken 4/11/2025 1550)  Outcome Evaluation: Treatment Plan: Awaiting podiatry rec.  Plan of Care Reviewed With: patient  Overall Patient Progress: no change  Goal: Patient-Specific Goal (Individualized)  Description: You can add care plan individualizations to a care plan. Examples of Individualization might be:  \"Parent requests to be called daily at 9am for status\", \"I have a hard time hearing out of my right ear\", or \"Do not touch me to wake me up as it startlesme\".  Outcome: Progressing  Goal: Absence of Hospital-Acquired Illness or Injury  Outcome: Progressing  Intervention: Identify and Manage Fall Risk  Recent Flowsheet Documentation  Taken 4/11/2025 1300 by Katiana Stein RN  Safety Promotion/Fall Prevention:   activity supervised   clutter free environment maintained   increased rounding and observation   increase visualization of patient   nonskid shoes/slippers when out of bed   room near nurse's station   safety round/check completed  Intervention: Prevent and Manage VTE (Venous " Thromboembolism) Risk  Recent Flowsheet Documentation  Taken 4/11/2025 1300 by Katiana Stein RN  VTE Prevention/Management: SCDs on (sequential compression devices)  Intervention: Prevent Infection  Recent Flowsheet Documentation  Taken 4/11/2025 1300 by Katiana Stein RN  Infection Prevention:   rest/sleep promoted   single patient room provided  Goal: Optimal Comfort and Wellbeing  Outcome: Progressing  Goal: Readiness for Transition of Care  Outcome: Progressing     Problem: Diabetes  Goal: Optimal Coping  Outcome: Progressing  Goal: Optimal Functional Ability  Outcome: Progressing  Goal: Blood Glucose Level Within Target Range  Outcome: Progressing  Goal: Minimize Risk of Hypoglycemia  Outcome: Progressing     Problem: Wound  Goal: Optimal Coping  Outcome: Progressing  Goal: Optimal Functional Ability  Outcome: Progressing  Goal: Absence of Infection Signs and Symptoms  Outcome: Progressing  Goal: Improved Oral Intake  Outcome: Progressing  Goal: Optimal Pain Control and Function  Outcome: Progressing  Goal: Skin Health and Integrity  Outcome: Progressing  Goal: Optimal Wound Healing  Outcome: Progressing   Goal Outcome Evaluation:      Plan of Care Reviewed With: patient    Overall Patient Progress: no changeOverall Patient Progress: no change    Outcome Evaluation: Treatment Plan: Awaiting podiatry rec.

## 2025-04-11 NOTE — OP NOTE
PREOPERATIVE DIAGNOSES:     1.  Left foot ulcer s/p open fifth ray resection           POSTOPERATIVE DIAGNOSES:   1.   Left foot ulcer s/p open fifth ray resection       PROCEDURE:     1.  Left foot fifth metatarsal resection   2.  Left foot excisional debridement down to and including tendon, site measuring 7 cm x 4 cm x 2 cm       ANESTHESIA:  MAC with local.       HEMOSTASIS:  Electrocautery.       ESTIMATED BLOOD LOSS:  10 mL.       SPECIMENS:  Fifth ray for pathology       MATERIALS:  None    Indications: Justino Giordano  has been followed for his left foot, where he had an open ffith ray resection. He's had a large amount of tissue necrosis dorsally and will undergo further debridement today. Discussed with him that the soft tissue defect dorsally will be quite large and he'll need a wound vac. Will also plan to resect the fifth metatarsal if needed to align with soft tissue coverage. Procedure was discussed with patient at length, including all risks and benefits. Patient agrees to planned procedure.     Procedure: Under mild sedation pt was brought into the OR & placed on the operating table in a supine position. A total of  10 cc of .5% marcaine were injected into the fifth met in a mays block formation  The foot was scrubbed, prepped and draped in an aseptic manner. The previous sutures were removed from the wound bed and a 15 blade and  was used to excise the necrotic wound edges. These were excised and debrided to healthy bleeding tissue. Time was spent excising nonviable tissue/tendon, using a rongeur. Site was debrided down to and including extensor and flexor tenon, using a rongeur and a 15 blade, to the measurements above.    Following debridement, it was decided to fifth metatarsal needed to be resected further proximally, due to it being exposed. The sagittal saw was used for this. The bone appeared grossly viable. This was sent for pathology.     All resected bone was sent to pathology.  All  nonviable soft tissue was resected  insuring no necrotic tissue proximal to the amputation remained. Next copious amounts of saline were used to flush the amputation site. A couple of retention sutures were placed using 2-0 nylon. Upon completion of suturing, a non-adhesive sterile dressing was then placed over the surgical site followed by 4 x 4s, kerlix, & an ACE wrap.    The pt tolerated the procedure & anesthesia well.  He was transferred to the recovery room with vital signs stable and vascular status intact to the left foot.  Following a period of post-op monitoring the pt will return to his hospital bed with the following written and oral post-op instructions:    Keep dressing clean, dry and intact.  Do not remove dressing.  WB to heel of LLE  Elevate foot at rest.  Continue IV antibiotics  Contact Dr. Preciado if any post-op questions or arrise.     Intraop findings: Overall site with much less infection, but with a large amount of necrotic tissue to the wound edges. Less than expected bleeding for procedure.     Post op plan: Will see on POD#1. Further plans pending clinical progress and pathology from today. Will eventually need wound vac to dorsal foot.

## 2025-04-11 NOTE — BRIEF OP NOTE
[facility]    Brief Operative Note    Pre-operative diagnosis: Wound of foot [S91.309A]  Gas gangrene of foot (H) [A48.0]  Post-operative diagnosis Same as pre-operative diagnosis    Procedure:   Left foot fifth metatarsal resection   Left foot excisional debridement down to and including tendon, site measuring 7 cm x 4 cm x 2 cm     Surgeon: Surgeons and Role:     * Denisse Preciado DPM - Primary  Anesthesia: MAC   Estimated Blood Loss: 10 ml     Drains: None  Specimens:   ID Type Source Tests Collected by Time Destination   1 : left foot fifth metatarsal Bone Resection Foot, Left SURGICAL PATHOLOGY EXAM Denisse Preciado DPM 4/11/2025 10:35 AM      Findings:   Resection of further necrotic tissue. No vivienne purulence or signs of ascending infection..  Complications: None.  Implants: * No implants in log *

## 2025-04-11 NOTE — PLAN OF CARE
"Goal Outcome Evaluation:    Assumed cares from [id number]   Pertinent assessments: Pt A&O. VSS, on capno RA. Up sba in room. Pt denies pain and nausea.    Major Shift Events POD 0 debridement, procedure went well pt denies pain or nausea post op.     Treatment Plan: Awaiting podiatry rec.     Bedside Nurse: Lilia Son RN         Problem: Pain Acute  Goal: Optimal Pain Control and Function  Outcome: Progressing  Intervention: Prevent or Manage Pain  Recent Flowsheet Documentation  Taken 4/11/2025 1700 by Lilia Son RN  Medication Review/Management: medications reviewed     Problem: Adult Inpatient Plan of Care  Goal: Plan of Care Review  Description: The Plan of Care Review/Shift note should be completed every shift.  The Outcome Evaluation is a brief statement about your assessment that the patient is improving, declining, or no change.  This information will be displayed automatically on your shiftnote.  Outcome: Progressing  Flowsheets (Taken 4/11/2025 1820)  Outcome Evaluation: I&D done today  Plan of Care Reviewed With: patient  Overall Patient Progress: no change  Goal: Patient-Specific Goal (Individualized)  Description: You can add care plan individualizations to a care plan. Examples of Individualization might be:  \"Parent requests to be called daily at 9am for status\", \"I have a hard time hearing out of my right ear\", or \"Do not touch me to wake me up as it startlesme\".  Outcome: Progressing  Goal: Absence of Hospital-Acquired Illness or Injury  Outcome: Progressing  Intervention: Identify and Manage Fall Risk  Recent Flowsheet Documentation  Taken 4/11/2025 1700 by Lilia Son RN  Safety Promotion/Fall Prevention:   activity supervised   room near nurse's station   safety round/check completed  Goal: Optimal Comfort and Wellbeing  Outcome: Progressing  Goal: Readiness for Transition of Care  Outcome: Progressing     Problem: Diabetes  Goal: Optimal Coping  Outcome: Progressing  Goal: " Optimal Functional Ability  Outcome: Progressing  Intervention: Optimize Functional Ability  Recent Flowsheet Documentation  Taken 4/11/2025 1700 by Lilia Son RN  Activity Management: activity adjusted per tolerance  Activity Assistance Provided: assistance, stand-by  Goal: Blood Glucose Level Within Target Range  Outcome: Progressing  Goal: Minimize Risk of Hypoglycemia  Outcome: Progressing     Problem: Wound  Goal: Optimal Coping  Outcome: Progressing  Goal: Optimal Functional Ability  Outcome: Progressing  Intervention: Optimize Functional Ability  Recent Flowsheet Documentation  Taken 4/11/2025 1700 by Lilia Son RN  Activity Management: activity adjusted per tolerance  Activity Assistance Provided: assistance, stand-by  Goal: Absence of Infection Signs and Symptoms  Outcome: Progressing  Goal: Improved Oral Intake  Outcome: Progressing  Goal: Optimal Pain Control and Function  Outcome: Progressing  Goal: Skin Health and Integrity  Outcome: Progressing  Intervention: Optimize Skin Protection  Recent Flowsheet Documentation  Taken 4/11/2025 1700 by Lilia Son RN  Activity Management: activity adjusted per tolerance  Goal: Optimal Wound Healing  Outcome: Progressing         Plan of Care Reviewed With: patient    Overall Patient Progress: no changeOverall Patient Progress: no change    Outcome Evaluation: I&D done today

## 2025-04-11 NOTE — PROVIDER NOTIFICATION
Blood sugar 194.  BP 78/69, cuff rearranged and recheck for 119/84.  Respirations shallow and tachypneic. Pt sleepy but arouseable, follows commands. Dr. Roque notified. No orders received.

## 2025-04-11 NOTE — PLAN OF CARE
"End of Shift Summary  For vital signs and complete assessments, please see documentation flowsheets.     Pertinent assessments: Pt A&Ox4. VSS, on RA. Denies pain, no N/V. Dressing change this shift. Dressing to foot CDI, peripheral perfusion good. , 209. Multiple bowel movements this shift. All other system assessments unremarkable.    Major Shift Events: up to bathroom x3    Treatment Plan: IV Abx. Wound debridement tomorrow. Monitor Left foot dressing. Insulin education started. NPO at Midnight    Bedside Nurse: Tolu Guido RN     Goal Outcome Evaluation:      Problem: Adult Inpatient Plan of Care  Goal: Plan of Care Review  Description: The Plan of Care Review/Shift note should be completed every shift.  The Outcome Evaluation is a brief statement about your assessment that the patient is improving, declining, or no change.  This information will be displayed automatically on your shiftnote.  Outcome: Progressing  Flowsheets (Taken 4/10/2025 2236)  Outcome Evaluation: VSS on RA denies pain, getting around capably with foot dressing. personal care needs met. treating increased blood sugars with sliding scale insulin.  Plan of Care Reviewed With: patient  Overall Patient Progress: improving  Goal: Patient-Specific Goal (Individualized)  Description: You can add care plan individualizations to a care plan. Examples of Individualization might be:  \"Parent requests to be called daily at 9am for status\", \"I have a hard time hearing out of my right ear\", or \"Do not touch me to wake me up as it startlesme\".  Outcome: Progressing  Goal: Absence of Hospital-Acquired Illness or Injury  Outcome: Progressing  Intervention: Identify and Manage Fall Risk  Recent Flowsheet Documentation  Taken 4/10/2025 1700 by Tolu Guido RN  Safety Promotion/Fall Prevention:   activity supervised   increase visualization of patient   room near nurse's station  Intervention: Prevent Skin Injury  Recent Flowsheet " Documentation  Taken 4/10/2025 1700 by Tolu Guido RN  Body Position: heels elevated  Taken 4/10/2025 1600 by Tolu Guido RN  Body Position: heels elevated  Intervention: Prevent Infection  Recent Flowsheet Documentation  Taken 4/10/2025 1700 by Tolu Guido RN  Infection Prevention:   cohorting utilized   hand hygiene promoted   personal protective equipment utilized   single patient room provided   rest/sleep promoted  Goal: Optimal Comfort and Wellbeing  Outcome: Progressing  Intervention: Monitor Pain and Promote Comfort  Recent Flowsheet Documentation  Taken 4/10/2025 1700 by Tolu Guido RN  Pain Management Interventions: medication (see MAR)  Intervention: Provide Person-Centered Care  Recent Flowsheet Documentation  Taken 4/10/2025 1700 by Tolu Guido RN  Trust Relationship/Rapport: care explained  Goal: Readiness for Transition of Care  Outcome: Progressing     Problem: Delirium  Goal: Optimal Coping  Outcome: Progressing  Goal: Improved Behavioral Control  Outcome: Progressing  Intervention: Minimize Safety Risk  Recent Flowsheet Documentation  Taken 4/10/2025 1700 by Tolu Guido RN  Communication Support Strategies: active listening utilized  Enhanced Safety Measures: room near unit station  Trust Relationship/Rapport: care explained  Goal: Improved Attention and Thought Clarity  Outcome: Progressing  Goal: Improved Sleep  Outcome: Progressing     Problem: Skin Injury Risk Increased  Goal: Skin Health and Integrity  Outcome: Progressing  Intervention: Plan: Nurse Driven Intervention: Moisture Management  Recent Flowsheet Documentation  Taken 4/10/2025 1700 by Tolu Guido RN  Moisture Interventions: Encourage regular toileting  Taken 4/10/2025 1600 by Tolu Guido RN  Bathing/Skin Care: incontinence care  Intervention: Optimize Skin Protection  Recent Flowsheet Documentation  Taken 4/10/2025 1700 by Tolu Guido RN  Head of Bed (HOB)  Positioning: HOB at 15 degrees  Taken 4/10/2025 1600 by Tolu Guido, RN  Activity Management: ambulated to bathroom  Head of Bed (HOB) Positioning: HOB at 20-30 degrees     Problem: Comorbidity Management  Goal: Blood Glucose Levels Within Targeted Range  Outcome: Progressing  Intervention: Monitor and Manage Glycemia  Recent Flowsheet Documentation  Taken 4/10/2025 1700 by Tolu Guido, RN  Medication Review/Management: medications reviewed     Problem: Infection  Goal: Absence of Infection Signs and Symptoms  Outcome: Progressing         Plan of Care Reviewed With: patient    Overall Patient Progress: improvingOverall Patient Progress: improving    Outcome Evaluation: VSS on RA denies pain, getting around capably with foot dressing. personal care needs met. treating increased blood sugars with sliding scale insulin.

## 2025-04-11 NOTE — PLAN OF CARE
"End of Shift Summary  For vital signs and complete assessments, please see documentation flowsheets.     Pertinent assessments: VSS on RA. Denies pain. Dressing CDI, left toes warm, with normal color and sensation.  . Pt did not have any loose stools overnight, but he did have one around 0700.     Treatment Plan: IV Abx. Wound debridement today, NPO at midnight.  Monitor Left foot dressing. Insulin education.       Plan of Care Reviewed With: patient    Overall Patient Progress: no changeOverall Patient Progress: no change    Outcome Evaluation: VSS on RA. NPO since midnight for debridement of left foot wound. Dressing CDI.      Problem: Adult Inpatient Plan of Care  Goal: Plan of Care Review  Description: The Plan of Care Review/Shift note should be completed every shift.  The Outcome Evaluation is a brief statement about your assessment that the patient is improving, declining, or no change.  This information will be displayed automatically on your shiftnote.  Outcome: Not Progressing  Flowsheets (Taken 4/11/2025 0733)  Outcome Evaluation: VSS on RA. NPO since midnight for debridement of left foot wound. Dressing CDI.  Plan of Care Reviewed With: patient  Overall Patient Progress: no change  Goal: Patient-Specific Goal (Individualized)  Description: You can add care plan individualizations to a care plan. Examples of Individualization might be:  \"Parent requests to be called daily at 9am for status\", \"I have a hard time hearing out of my right ear\", or \"Do not touch me to wake me up as it startlesme\".  Outcome: Not Progressing  Goal: Absence of Hospital-Acquired Illness or Injury  Outcome: Not Progressing  Intervention: Identify and Manage Fall Risk  Recent Flowsheet Documentation  Taken 4/11/2025 0210 by Cher Bobby, RN  Safety Promotion/Fall Prevention: safety round/check completed  Intervention: Prevent Skin Injury  Recent Flowsheet Documentation  Taken 4/11/2025 0210 by Cher Bobby, RN  Body " Position:   heels elevated   position changed independently  Goal: Optimal Comfort and Wellbeing  Outcome: Not Progressing  Goal: Readiness for Transition of Care  Outcome: Not Progressing     Problem: Delirium  Goal: Optimal Coping  Outcome: Not Progressing  Goal: Improved Behavioral Control  Outcome: Not Progressing  Intervention: Minimize Safety Risk  Recent Flowsheet Documentation  Taken 4/11/2025 0210 by Cher Bobby RN  Enhanced Safety Measures: room near unit station  Goal: Improved Attention and Thought Clarity  Outcome: Not Progressing  Goal: Improved Sleep  Outcome: Not Progressing     Problem: Skin Injury Risk Increased  Goal: Skin Health and Integrity  Outcome: Not Progressing  Intervention: Plan: Nurse Driven Intervention: Moisture Management  Recent Flowsheet Documentation  Taken 4/11/2025 0210 by Cher Bobby RN  Moisture Interventions: Encourage regular toileting  Intervention: Optimize Skin Protection  Recent Flowsheet Documentation  Taken 4/11/2025 0210 by Cher Bobby RN  Activity Management: activity adjusted per tolerance     Problem: Comorbidity Management  Goal: Blood Glucose Levels Within Targeted Range  Outcome: Not Progressing  Intervention: Monitor and Manage Glycemia  Recent Flowsheet Documentation  Taken 4/11/2025 0210 by Cher Bobby RN  Medication Review/Management: medications reviewed     Problem: Infection  Goal: Absence of Infection Signs and Symptoms  Outcome: Not Progressing

## 2025-04-12 ENCOUNTER — HEALTH MAINTENANCE LETTER (OUTPATIENT)
Age: 65
End: 2025-04-12

## 2025-04-12 LAB
BACTERIA BLD CULT: NO GROWTH
BACTERIA BLD CULT: NO GROWTH
BASOPHILS # BLD AUTO: 0.1 10E3/UL (ref 0–0.2)
BASOPHILS NFR BLD AUTO: 0 %
CREAT SERPL-MCNC: 0.78 MG/DL (ref 0.67–1.17)
EGFRCR SERPLBLD CKD-EPI 2021: >90 ML/MIN/1.73M2
EOSINOPHIL # BLD AUTO: 0.2 10E3/UL (ref 0–0.7)
EOSINOPHIL NFR BLD AUTO: 1 %
GLUCOSE BLDC GLUCOMTR-MCNC: 122 MG/DL (ref 70–99)
GLUCOSE BLDC GLUCOMTR-MCNC: 167 MG/DL (ref 70–99)
GLUCOSE BLDC GLUCOMTR-MCNC: 176 MG/DL (ref 70–99)
GLUCOSE BLDC GLUCOMTR-MCNC: 176 MG/DL (ref 70–99)
GLUCOSE BLDC GLUCOMTR-MCNC: 181 MG/DL (ref 70–99)
GLUCOSE BLDC GLUCOMTR-MCNC: 185 MG/DL (ref 70–99)
IMM GRANULOCYTES # BLD: 0.2 10E3/UL
IMM GRANULOCYTES NFR BLD: 1 %
LYMPHOCYTES # BLD AUTO: 1.7 10E3/UL (ref 0.8–5.3)
LYMPHOCYTES NFR BLD AUTO: 14 %
MAGNESIUM SERPL-MCNC: 1.8 MG/DL (ref 1.7–2.3)
MONOCYTES # BLD AUTO: 0.9 10E3/UL (ref 0–1.3)
MONOCYTES NFR BLD AUTO: 7 %
NEUTROPHILS # BLD AUTO: 9 10E3/UL (ref 1.6–8.3)
NEUTROPHILS NFR BLD AUTO: 76 %
NRBC # BLD AUTO: 0 10E3/UL
NRBC BLD AUTO-RTO: 0 /100
PHOSPHATE SERPL-MCNC: 3.4 MG/DL (ref 2.5–4.5)
POTASSIUM SERPL-SCNC: 3.7 MMOL/L (ref 3.4–5.3)
WBC # BLD AUTO: 12 10E3/UL (ref 4–11)

## 2025-04-12 PROCEDURE — 250N000011 HC RX IP 250 OP 636: Performed by: PHYSICIAN ASSISTANT

## 2025-04-12 PROCEDURE — 120N000001 HC R&B MED SURG/OB

## 2025-04-12 PROCEDURE — 84132 ASSAY OF SERUM POTASSIUM: CPT | Performed by: INTERNAL MEDICINE

## 2025-04-12 PROCEDURE — 99232 SBSQ HOSP IP/OBS MODERATE 35: CPT | Performed by: INTERNAL MEDICINE

## 2025-04-12 PROCEDURE — 36415 COLL VENOUS BLD VENIPUNCTURE: CPT | Performed by: PODIATRIST

## 2025-04-12 PROCEDURE — 250N000013 HC RX MED GY IP 250 OP 250 PS 637: Performed by: INTERNAL MEDICINE

## 2025-04-12 PROCEDURE — 84100 ASSAY OF PHOSPHORUS: CPT | Performed by: INTERNAL MEDICINE

## 2025-04-12 PROCEDURE — 83735 ASSAY OF MAGNESIUM: CPT | Performed by: INTERNAL MEDICINE

## 2025-04-12 PROCEDURE — 36415 COLL VENOUS BLD VENIPUNCTURE: CPT | Performed by: INTERNAL MEDICINE

## 2025-04-12 PROCEDURE — 82565 ASSAY OF CREATININE: CPT | Performed by: INTERNAL MEDICINE

## 2025-04-12 PROCEDURE — 85004 AUTOMATED DIFF WBC COUNT: CPT | Performed by: PODIATRIST

## 2025-04-12 RX ORDER — POTASSIUM CHLORIDE 1500 MG/1
20 TABLET, EXTENDED RELEASE ORAL ONCE
Status: COMPLETED | OUTPATIENT
Start: 2025-04-12 | End: 2025-04-12

## 2025-04-12 RX ORDER — MAGNESIUM OXIDE 400 MG/1
400 TABLET ORAL EVERY 4 HOURS
Status: COMPLETED | OUTPATIENT
Start: 2025-04-12 | End: 2025-04-12

## 2025-04-12 RX ADMIN — CLINDAMYCIN PHOSPHATE 900 MG: 900 INJECTION, SOLUTION INTRAVENOUS at 05:22

## 2025-04-12 RX ADMIN — LISINOPRIL 20 MG: 20 TABLET ORAL at 09:30

## 2025-04-12 RX ADMIN — ATORVASTATIN CALCIUM 40 MG: 40 TABLET, FILM COATED ORAL at 19:21

## 2025-04-12 RX ADMIN — POTASSIUM CHLORIDE 20 MEQ: 20 TABLET, EXTENDED RELEASE ORAL at 09:30

## 2025-04-12 RX ADMIN — Medication 400 MG: at 13:04

## 2025-04-12 RX ADMIN — Medication 400 MG: at 09:30

## 2025-04-12 RX ADMIN — CLINDAMYCIN PHOSPHATE 900 MG: 900 INJECTION, SOLUTION INTRAVENOUS at 20:06

## 2025-04-12 RX ADMIN — CLINDAMYCIN PHOSPHATE 900 MG: 900 INJECTION, SOLUTION INTRAVENOUS at 13:04

## 2025-04-12 ASSESSMENT — ACTIVITIES OF DAILY LIVING (ADL)
DEPENDENT_IADLS:: INDEPENDENT
ADLS_ACUITY_SCORE: 37

## 2025-04-12 NOTE — PLAN OF CARE
Goal Outcome Evaluation:                 Outcome Evaluation: Patient admitted from home independently. Anticipated discharge plan is home w/ home care and wound vac.

## 2025-04-12 NOTE — PLAN OF CARE
"Pertinent assessments: A&O, up sba w/ walker. Pt denies pain sob or nausea. POD 1 L foot debridement, denies numbness or tingling, extremity elevated, able to move foot without difficulty. Podiatry changed dressing today.  and 176    Major Shift Events Wound vac ordered. K Mag replaced.    Treatment Plan: Monitor BG. Cont to elevate L foot, monitor dressing and CMS.     Bedside Nurse: Katiana Stein RN     Problem: Pain Acute  Goal: Optimal Pain Control and Function  Outcome: Progressing  Intervention: Prevent or Manage Pain  Recent Flowsheet Documentation  Taken 4/12/2025 1000 by Katiana Stein, RN  Medication Review/Management: medications reviewed     Problem: Adult Inpatient Plan of Care  Goal: Plan of Care Review  Description: The Plan of Care Review/Shift note should be completed every shift.  The Outcome Evaluation is a brief statement about your assessment that the patient is improving, declining, or no change.  This information will be displayed automatically on your shiftnote.  Outcome: Progressing  Flowsheets (Taken 4/12/2025 1505)  Outcome Evaluation: Treatment Plan: Monitor BG. Cont to elevate L foot, monitor dressing and CMS.  Plan of Care Reviewed With: patient  Overall Patient Progress: improving  Goal: Patient-Specific Goal (Individualized)  Description: You can add care plan individualizations to a care plan. Examples of Individualization might be:  \"Parent requests to be called daily at 9am for status\", \"I have a hard time hearing out of my right ear\", or \"Do not touch me to wake me up as it startlesme\".  Outcome: Progressing  Goal: Absence of Hospital-Acquired Illness or Injury  Outcome: Progressing  Intervention: Identify and Manage Fall Risk  Recent Flowsheet Documentation  Taken 4/12/2025 1000 by Katiana Stein RN  Safety Promotion/Fall Prevention:   activity supervised   nonskid shoes/slippers when out of bed   room near nurse's station   safety round/check " completed  Intervention: Prevent and Manage VTE (Venous Thromboembolism) Risk  Recent Flowsheet Documentation  Taken 4/12/2025 1000 by Katiana Stein RN  VTE Prevention/Management: SCDs off (sequential compression devices)  Intervention: Prevent Infection  Recent Flowsheet Documentation  Taken 4/12/2025 1000 by Katiana Stein RN  Infection Prevention: rest/sleep promoted  Goal: Optimal Comfort and Wellbeing  Outcome: Progressing  Goal: Readiness for Transition of Care  Outcome: Progressing     Problem: Diabetes  Goal: Optimal Coping  Outcome: Progressing  Goal: Optimal Functional Ability  Outcome: Progressing  Goal: Blood Glucose Level Within Target Range  Outcome: Progressing  Goal: Minimize Risk of Hypoglycemia  Outcome: Progressing     Problem: Wound  Goal: Optimal Coping  Outcome: Progressing  Goal: Optimal Functional Ability  Outcome: Progressing  Goal: Absence of Infection Signs and Symptoms  Outcome: Progressing  Goal: Improved Oral Intake  Outcome: Progressing  Goal: Optimal Pain Control and Function  Outcome: Progressing  Goal: Skin Health and Integrity  Outcome: Progressing  Goal: Optimal Wound Healing  Outcome: Progressing   Goal Outcome Evaluation:      Plan of Care Reviewed With: patient    Overall Patient Progress: improvingOverall Patient Progress: improving    Outcome Evaluation: Treatment Plan: Monitor BG. Cont to elevate L foot, monitor dressing and CMS.

## 2025-04-12 NOTE — PROGRESS NOTES
"Podiatry / Foot and Ankle Surgery Progress Note    April 12, 2025    Subject: Patient was seen at bedside.  Patient underwent an incision and debridement on the left foot with Dr. Davila yesterday.  Notes no pain.     Objective:  Vitals: BP (!) 145/91 (BP Location: Left arm)   Pulse 79   Temp 98.6  F (37  C) (Oral)   Resp 21   Ht 1.905 m (6' 3\")   Wt 117.2 kg (258 lb 4.8 oz)   SpO2 97%   BMI 32.29 kg/m    BMI= Body mass index is 32.29 kg/m .    WBC Count   Date Value Ref Range Status   04/08/2025 14.4 (H) 4.0 - 11.0 10e3/uL Final     Hemaglobin:12.9 (4/8/2025)    A1C: 12.3 (4/7/2025)    General:  Patient is alert and orientated.  NAD.    Vascular:  DP and PT pulses are faintly palpable.  No varicosities noted  CFT's < 3secs.  Skin temp is normal.     Neuro:  Light and gross touch sensation is absent to feet.     Derm:  Dressing is c/d/I. Sutures intact. Wound measures 11cm x 11cm x 1cm. Tendon exposed. No purulent drainage but heavy amounts of clear serous drainage noted.     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     Cultures:  1+ Fusobacterium species Abnormal      Pathology (4/11/2025): pending    Assessment: 65-year-old uncontrolled diabetic male status post left fifth ray resection and incision and debridement of the left foot with bone biopsy.    Plan:    - Dressing was changed at bedside.  -Keep foot and dressing dry.  -Can be minimal heel weightbearing on the left leg in postop boot/shoe.  - Awaiting pathology to see if further surgery is needed.  -Will follow back up with patient once pathology is available.  -order for wound vac placed and form placed in paperchart.     Bria Pagan DPM, Podiatry/Foot and Ankle Surgery  8:13 AM                  "

## 2025-04-12 NOTE — CONSULTS
Care Management Initial Consult    General Information  Assessment completed with: Patient,    Type of CM/SW Visit: Initial Assessment    Primary Care Provider verified and updated as needed:     Readmission within the last 30 days:        Reason for Consult: discharge planning  Advance Care Planning:            Communication Assessment  Patient's communication style: spoken language (English or Bilingual)    Hearing Difficulty or Deaf: no   Wear Glasses or Blind: no    Cognitive  Cognitive/Neuro/Behavioral: WDL  Level of Consciousness: alert  Arousal Level: opens eyes spontaneously  Orientation: oriented x 4  Mood/Behavior: calm, cooperative  Best Language: 0 - No aphasia  Speech: clear, spontaneous    Living Environment:   People in home:       Current living Arrangements:        Able to return to prior arrangements:         Family/Social Support:  Care provided by:    Provides care for:       Support system:            Description of Support System:           Current Resources:   Patient receiving home care services: No        Community Resources: None  Equipment currently used at home: none  Supplies currently used at home: None    Employment/Financial:  Employment Status: employed part-time        Financial Concerns: other (see comments) (pt expressed concerns about being off of work and without pay)           Does the patient's insurance plan have a 3 day qualifying hospital stay waiver?  No    Lifestyle & Psychosocial Needs:  Social Drivers of Health     Food Insecurity: Low Risk  (4/7/2025)    Food Insecurity     Within the past 12 months, did you worry that your food would run out before you got money to buy more?: No     Within the past 12 months, did the food you bought just not last and you didn t have money to get more?: No   Depression: Not at risk (4/8/2021)    PHQ-2     PHQ-2 Score: 0   Housing Stability: Low Risk  (4/7/2025)    Housing Stability     Do you have housing? : Yes     Are you worried  about losing your housing?: No   Tobacco Use: Low Risk  (4/11/2025)    Patient History     Smoking Tobacco Use: Never     Smokeless Tobacco Use: Never     Passive Exposure: Not on file   Financial Resource Strain: Low Risk  (4/7/2025)    Financial Resource Strain     Within the past 12 months, have you or your family members you live with been unable to get utilities (heat, electricity) when it was really needed?: No   Alcohol Use: Not on file   Transportation Needs: Low Risk  (4/7/2025)    Transportation Needs     Within the past 12 months, has lack of transportation kept you from medical appointments, getting your medicines, non-medical meetings or appointments, work, or from getting things that you need?: No   Physical Activity: Not on file   Interpersonal Safety: Low Risk  (4/7/2025)    Interpersonal Safety     Do you feel physically and emotionally safe where you currently live?: Yes     Within the past 12 months, have you been hit, slapped, kicked or otherwise physically hurt by someone?: No     Within the past 12 months, have you been humiliated or emotionally abused in other ways by your partner or ex-partner?: No   Stress: Not on file   Social Connections: Not on file   Health Literacy: Not on file       Functional Status:  Prior to admission patient needed assistance:   Dependent ADLs:: Independent  Dependent IADLs:: Independent             Discussed  Partnership in Safe Discharge Planning  document with patient/family: No    Additional Information:  CM consulted for discharge planning. Spoke to patient at bedside and introduced our role as . Patient admitted with sepsis due to necrotic left foot infection/osteomyelitis. S/P resection w/ subsequent debridement, pod 4 from initial resection. Patient comes from home independently, no services or equipment. Per podiatry's note, likely discharge home on Monday, 4/14, with wound vac and home health care. Patient does not have a PCP and has asked CM  to arrange a PCP for him in the [place] area. May have to reach out to podiatry for management of home care orders.   Home Wound Vac approval process has been started and [facility] referrals sent.      Jackie Garcia RN BSN CM   Inpatient Care Coordination  [facility]

## 2025-04-12 NOTE — PROGRESS NOTES
[facility]    Medicine Progress Note - Hospitalist Service    Date of Admission:  4/7/2025    Primary Care Physician   Physician No Ref-Primary  CONSULTANTS: podiatry     Assessment & Plan   Justino Giordano is a 65 year old male with a history of HTN, DM Type 2, HLD, Obesity who presents to the ED today with a left foot injury.        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  He has not been taking medications nor caring for his diabetes. .  He was afebrile, hypertensive, HR 90, RR 20.  WBC 16.4,  with negative lactic acid.  Left Foot Xray revealed extensive soft tissue gas within the dorsal forefoot along the second through fifth metacarpal heads/necks and surrounding the fifth MTP joint, highly concerning for necrotizing soft tissue infection. There is some localized osteopenia at the medial base of the fifth proximal phalanx which may be secondary to superimposition of the soft tissue gas. No discrete osseous destructive change. Minimal scattered degenerative arthritis.  He was placed on IV fluids and made NPO.  He was empirically placed on Vancomycin, Zosyn, Clindamycin. Wound Culture with gram stain with a  polymicrobial infection with culture showing laura and fusobacterium on admission and from surgery.  Will discontinue zosyn/vanco and continue on IV clindamycin.   Should be able to transition to oral antibiotics soon as no signs of bacteremia and now has source control, when ok with podiatry.  Podiatry consulted and patient taken on 4/8/25 for left 5th ray resection with debridement.    Only to be weight bearing to heal on the left lower extremity, Cam boot per podiatry.  Patient works as a  for [employer]. Patient had more debridement per podiatry on  4/11, likely to need wound vac. Wound care per podiatry . Pain well controlled, likely with severe neuropathy. Physical therapy to see to make sure he will be safe for home. Recommend outpatient physical therapy.  Patient had a normal ARASELI US of the lower extremity.      Noncompliance  Patient has not been taking any medications nor caring for his diabetes, hypertension.  This puts him at risk for readmissions, morbidity, and early mortality. We discussed the importance of taking care of his medical issues at length. Need to make medications and care as easy as possible for him so that he will be compliant.       Untreated and Uncontrolled type 2 diabetes, will be insulin dependent with peripheral neuropathy  Hgb A1C 11.7 (4/2021) and was 6.6 in (2013).  BS on admission 282 with hemoglobin A1c up to 12.3.  Bicarb 20, AG 19.  Not technically DKA by criteria.  Patient denies he has a history of diabetes, although reports he has had high blood sugars in the past.  No longer takes Metformin and never started Lantus as recommended in 2021. Given the elevated A1c above 10 will need insulin.  Was started on lantus and an insulin correction scale.  Needs close primary care provider follow up with diabetic education.   Blood sugar now < 200,    No changes in insulin today, continue lantus/prandial insulin with correction scale.     Needs insulin teaching per nursing     Hypertension, hyperlipidemia  -Previously on Atorvastatin, no longer takes this.  BP elevated 150-170s on arrival. , HDL 41, .  Will start on lipitor.   BP currently  130-140s/80-90s  - monitor bp trend,  - Ace inhibitor added 4/9.  follow his BMP and BP in a few weeks with a primary care provider      Hyponatremia, Hypochloremia  Sodium when corrected for glucose is 131, chloride 88.  - IVF hydration, repeat sodium 128  -repeat bmp 4/11, sodium up to 132, creatinine normal       Discussed plan of care with nursing, and podiatry notes  "reviewed    Diet: Moderate Consistent Carb (60 g CHO per Meal) Diet    DVT Prophylaxis: Pneumatic Compression Devices  Nuñez Catheter: Not present  Lines: None     Cardiac Monitoring: None    RESTRAINTS: not indicated  Code Status: Full Code      This document was created using voice recognition technology.  Please excuse any typographical errors that may have occurred.  Please call with any questions.        Clinically Significant Risk Factors          # Hyponatremia: Lowest Na = 132 mmol/L in last 2 days, will monitor as appropriate           # Hypertension: Noted on problem list           # DMII: A1C = 12.3 % (Ref range: <5.7 %) within past 6 months   # Obesity: Estimated body mass index is 32.29 kg/m  as calculated from the following:    Height as of this encounter: 1.905 m (6' 3\").    Weight as of this encounter: 117.2 kg (258 lb 4.8 oz).       complicates all aspects of his care       Disposition Plan     Expected Discharge Date: 04/14/2025                  Barrier to discharge: podiatry surgery, IV antibiotics   Medically Ready for Discharge: Anticipated in 2-4 Days depends on surgical interventions.  Wound vac vs wound care, may need placement depending on if wound care  is needed       Bernardo Valdivia MD  Hospitalist Service  [facility]  Securely message with [employer] (more info)  Text page via [employer] Paging/Directory   ______________________________________________________________________    Interval History   Patient without any changes. Had debridement of foot yesterday, no new complaints.  Blood sugar better.       ROS: A comprehensive review of systems was negative except for items noted in the HPI.  Patient currently denies any fever, chills, sweats, nausea, vomiting, diarrhea, shortness of breath, or chest pain.       Physical Exam   Vital Signs: Temp: 98.6  F (37  C) Temp src: Oral BP: (!) 145/91 Pulse: 79   Resp: 21 SpO2: 97 % O2 Device: None (Room air)    Weight: 258 lbs 4.8 " oz    Exam stable from yesterday     General: Alert, interactive, NAD, lying in bed, appears stated age  HEENT: MMM  Chest/Resp: clear to auscultation bilaterally, good air movement  Heart/CV: regular rate and rhythm, no murmur  Abdomen/GI: Soft, nontender, nondistended. +BS. obese  Extremities/MSK/Skin:  left foot dressing ace wrap, c/d/I not removed by me  Neuro: Alert & oriented x 3, no focal deficits      Data     I have personally reviewed the following data over the past 24 hrs:    N/A  \   N/A   / N/A     N/A N/A N/A /  181 (H)   3.7 N/A 0.78 \       Imaging:   Results for orders placed or performed during the hospital encounter of 04/07/25   Foot XR, G/E 3 views, left    Narrative    EXAM: XR FOOT LEFT G/E 3 VIEWS  LOCATION: [facility]  DATE: 4/7/2025    INDICATION: left foot wound  COMPARISON: None.      Impression    IMPRESSION: Extensive soft tissue gas within the dorsal forefoot along the second through fifth metacarpal heads/necks and surrounding the fifth MTP joint, highly concerning for necrotizing soft tissue infection. There is some localized osteopenia at the   medial base of the fifth proximal phalanx which may be secondary to superimposition of the soft tissue gas. No discrete osseous destructive change. Minimal scattered degenerative arthritis. Plantar calcaneal spur. Benign dystrophic calcifications along   the course of the plantar fascia. Arterial calcifications.    NOTE: ABNORMAL REPORT    THE DICTATION ABOVE DESCRIBES AN ABNORMALITY FOR WHICH FOLLOW-UP IS NEEDED.    MR Foot Left w/o Contrast     Value    Radiologist flags Presumed necrotizing infection, in the left foot,    Narrative    Exam: MRI of the left forefoot and midfoot without contrast dated  4/7/2025.    COMPARISON: Radiographs from the same day.    CLINICAL HISTORY: Evaluate for osteomyelitis in patient with fifth  digit infection.    TECHNIQUE: Multiplanar, multisequence MR imaging of the left  forefoot  and midfoot was obtained using standard sequences in 3 orthogonal  planes without the use of intravenous or intra-articular gadolinium  contrast.    FINDINGS:    As seen on the plain radiographs, there is extensive air within the  subcutaneous soft tissues along the dorsal aspect of the left foot,  dorsal to the third through fifth distal metatarsals, greatest along  the dorsal aspect of the distal fifth metatarsal, at the fifth  metatarsophalangeal joint. Subtle bone marrow edema in the distal  fifth metatarsal as well as in the proximal to mid aspect of the fifth  toe proximal phalanx, with no significant low T1 signal. No  significant fluid at the left fifth metatarsophalangeal joint.     Scattered degenerative changes with subchondral edema including at the  distal first metatarsophalangeal joint as well as at the navicular  cuneiform joint.    The Lisfranc ligament is intact and the Lisfranc joint is intact.    Minimal fluid in the third intermetatarsal bursa.    Mild fatty infiltration within the muscles about the foot with  extensive soft tissue edema. No discrete abscess although contrast was  not administered, mildly limiting evaluation.    The tendinous structures are grossly intact. No intermetatarsal  masses.      Impression    IMPRESSION:  1. Extensive presumed necrotizing infection within the soft tissues of  the left forefoot/midfoot with soft tissue defect seen along the  dorsal lateral aspect of the foot. There is extensive gas formation,  as seen on the radiographs, extending along the dorsal aspect of the  third through fifth metatarsals, greatest at the fifth  metatarsophalangeal joint. No significant joint effusion at the fifth  metatarsophalangeal joint to suggest septic joint. There is reactive  bone marrow edema in the distal fifth metatarsal, and in the fifth toe  proximal phalanx without definite corresponding low T1 signal,  findings presumed to represent osteitis however early  osteomyelitis  cannot be excluded. Correlate with clinical exam.    2. Diffuse soft tissue edema within the subcutaneous tissues and  within the muscles of the left forefoot/midfoot, most likely  representing a cellulitis/myositis. No discrete abscess although  contrast was not administered.    3. Degenerative changes at the navicular cuneiform joint as well as  first metatarsophalangeal joint.    [Consider Follow Up: Presumed necrotizing infection, in the left foot,  as above]    This report will be copied to the [facility] to ensure a  provider acknowledges the finding. [facility] Center is available Monday  through Friday 8am-3:30 pm.       BRIEN MICHAELS MD         SYSTEM ID:  [id number]     Procedures: 4/8/25 per podiatry  PROCEDURE:     1. Left foot fifth ray resection   2. Left foot excisional debridement down to and including tendon for site measuring 6 cm x 4 cm x 2 cm     Per podiatry 4/11:    1.  Left foot fifth metatarsal resection   2.  Left foot excisional debridement down to and including tendon, site measuring 7 cm x 4 cm x 2 cm     I have personally have reviewed the patient's most up to labs, orders, and medications myself

## 2025-04-12 NOTE — PLAN OF CARE
"Goal Outcome Evaluation:    Pertinent assessments: Pt A&O. VSS on RA, capno. Pt denies pain, nausea. PIV x2 patent, saline locked. Infused clindamycin x2 in L arm IV. Up SBA w/gb & walker to bathroom. CMS intact to L foot. B, 176, 181.    Major Shift Events: none    Treatment Plan: Pain management, IV ABX, monitor BG, diabetes education, await podiatry recommendations.    Bedside Nurse: Jackie Sue RN      Plan of Care Reviewed With: patient    Overall Patient Progress: improvingOverall Patient Progress: improving    Outcome Evaluation: Denies pain, nausea. VSS on RA, capno. IV clindamycin x2. Up SBA w/gb & walker. WB to L heel.      Problem: Pain Acute  Goal: Optimal Pain Control and Function  Outcome: Progressing  Intervention: Prevent or Manage Pain  Recent Flowsheet Documentation  Taken 2025 by Jackie Sue, RN  Sleep/Rest Enhancement: awakenings minimized  Medication Review/Management: medications reviewed     Problem: Adult Inpatient Plan of Care  Goal: Plan of Care Review  Description: The Plan of Care Review/Shift note should be completed every shift.  The Outcome Evaluation is a brief statement about your assessment that the patient is improving, declining, or no change.  This information will be displayed automatically on your shiftnote.  Outcome: Progressing  Flowsheets (Taken 2025 0622)  Outcome Evaluation: Denies pain, nausea. VSS on RA, capno. IV clindamycin x2. Up SBA w/gb & walker. WB to L heel.  Plan of Care Reviewed With: patient  Overall Patient Progress: improving  Goal: Patient-Specific Goal (Individualized)  Description: You can add care plan individualizations to a care plan. Examples of Individualization might be:  \"Parent requests to be called daily at 9am for status\", \"I have a hard time hearing out of my right ear\", or \"Do not touch me to wake me up as it startlesme\".  Outcome: Progressing  Goal: Absence of Hospital-Acquired Illness or Injury  Outcome: " Progressing  Intervention: Identify and Manage Fall Risk  Recent Flowsheet Documentation  Taken 4/11/2025 2221 by Jackie Sue, RN  Safety Promotion/Fall Prevention:   assistive device/personal items within reach   clutter free environment maintained   mobility aid in reach   nonskid shoes/slippers when out of bed   safety round/check completed  Intervention: Prevent Skin Injury  Recent Flowsheet Documentation  Taken 4/11/2025 2221 by Jackie Sue, RN  Body Position: position changed independently  Intervention: Prevent and Manage VTE (Venous Thromboembolism) Risk  Recent Flowsheet Documentation  Taken 4/11/2025 2221 by Jackie Sue RN  VTE Prevention/Management: SCDs on (sequential compression devices)  Intervention: Prevent Infection  Recent Flowsheet Documentation  Taken 4/11/2025 2221 by Jackie Sue RN  Infection Prevention:   hand hygiene promoted   rest/sleep promoted   single patient room provided  Goal: Optimal Comfort and Wellbeing  Outcome: Progressing  Intervention: Provide Person-Centered Care  Recent Flowsheet Documentation  Taken 4/11/2025 2221 by Jackie Sue RN  Trust Relationship/Rapport:   care explained   choices provided  Goal: Readiness for Transition of Care  Outcome: Progressing     Problem: Diabetes  Goal: Optimal Coping  Outcome: Progressing  Goal: Optimal Functional Ability  Outcome: Progressing  Intervention: Optimize Functional Ability  Recent Flowsheet Documentation  Taken 4/11/2025 2221 by Jackie Sue RN  Assistive Device Utilized:   gait belt   walker  Activity Management: activity adjusted per tolerance  Activity Assistance Provided: assistance, stand-by  Goal: Blood Glucose Level Within Target Range  Outcome: Progressing  Intervention: Optimize Glycemic Control  Recent Flowsheet Documentation  Taken 4/11/2025 2221 by Jackie Sue RN  Hyperglycemia Management: blood glucose monitored  Goal: Minimize Risk of Hypoglycemia  Outcome: Progressing  Intervention:  Management and Risk Reduction of Hypoglycemia  Recent Flowsheet Documentation  Taken 4/11/2025 2221 by Jackie Sue, RN  Hypoglycemia Management: blood glucose monitored     Problem: Wound  Goal: Optimal Coping  Outcome: Progressing  Goal: Optimal Functional Ability  Outcome: Progressing  Intervention: Optimize Functional Ability  Recent Flowsheet Documentation  Taken 4/11/2025 2221 by Jackie Sue, RN  Assistive Device Utilized:   gait belt   walker  Activity Management: activity adjusted per tolerance  Activity Assistance Provided: assistance, stand-by  Goal: Absence of Infection Signs and Symptoms  Outcome: Progressing  Goal: Improved Oral Intake  Outcome: Progressing  Goal: Optimal Pain Control and Function  Outcome: Progressing  Intervention: Prevent or Manage Pain  Recent Flowsheet Documentation  Taken 4/11/2025 2221 by Jackie Sue RN  Sleep/Rest Enhancement: awakenings minimized  Goal: Skin Health and Integrity  Outcome: Progressing  Intervention: Optimize Skin Protection  Recent Flowsheet Documentation  Taken 4/11/2025 2221 by Jackie Sue, RN  Activity Management: activity adjusted per tolerance  Head of Bed (HOB) Positioning: HOB flat  Goal: Optimal Wound Healing  Outcome: Progressing  Intervention: Promote Wound Healing  Recent Flowsheet Documentation  Taken 4/11/2025 2221 by Jackie Sue, RN  Sleep/Rest Enhancement: awakenings minimized

## 2025-04-13 LAB
CREAT SERPL-MCNC: 0.72 MG/DL (ref 0.67–1.17)
EGFRCR SERPLBLD CKD-EPI 2021: >90 ML/MIN/1.73M2
GLUCOSE BLDC GLUCOMTR-MCNC: 139 MG/DL (ref 70–99)
GLUCOSE BLDC GLUCOMTR-MCNC: 146 MG/DL (ref 70–99)
GLUCOSE BLDC GLUCOMTR-MCNC: 165 MG/DL (ref 70–99)
GLUCOSE BLDC GLUCOMTR-MCNC: 172 MG/DL (ref 70–99)
GLUCOSE BLDC GLUCOMTR-MCNC: 225 MG/DL (ref 70–99)
GLUCOSE BLDC GLUCOMTR-MCNC: 227 MG/DL (ref 70–99)
MAGNESIUM SERPL-MCNC: 1.8 MG/DL (ref 1.7–2.3)
PHOSPHATE SERPL-MCNC: 3.4 MG/DL (ref 2.5–4.5)
POTASSIUM SERPL-SCNC: 3.9 MMOL/L (ref 3.4–5.3)

## 2025-04-13 PROCEDURE — 82565 ASSAY OF CREATININE: CPT | Performed by: INTERNAL MEDICINE

## 2025-04-13 PROCEDURE — 120N000001 HC R&B MED SURG/OB

## 2025-04-13 PROCEDURE — 84100 ASSAY OF PHOSPHORUS: CPT | Performed by: INTERNAL MEDICINE

## 2025-04-13 PROCEDURE — 36415 COLL VENOUS BLD VENIPUNCTURE: CPT | Performed by: INTERNAL MEDICINE

## 2025-04-13 PROCEDURE — 84132 ASSAY OF SERUM POTASSIUM: CPT | Performed by: INTERNAL MEDICINE

## 2025-04-13 PROCEDURE — 250N000011 HC RX IP 250 OP 636: Performed by: PHYSICIAN ASSISTANT

## 2025-04-13 PROCEDURE — 99232 SBSQ HOSP IP/OBS MODERATE 35: CPT | Performed by: INTERNAL MEDICINE

## 2025-04-13 PROCEDURE — 250N000013 HC RX MED GY IP 250 OP 250 PS 637: Performed by: INTERNAL MEDICINE

## 2025-04-13 PROCEDURE — 83735 ASSAY OF MAGNESIUM: CPT | Performed by: INTERNAL MEDICINE

## 2025-04-13 RX ORDER — MAGNESIUM OXIDE 400 MG/1
400 TABLET ORAL EVERY 4 HOURS
Status: COMPLETED | OUTPATIENT
Start: 2025-04-13 | End: 2025-04-13

## 2025-04-13 RX ORDER — HYDROCHLOROTHIAZIDE 25 MG/1
25 TABLET ORAL DAILY
Status: DISCONTINUED | OUTPATIENT
Start: 2025-04-13 | End: 2025-04-19 | Stop reason: HOSPADM

## 2025-04-13 RX ORDER — ACETAMINOPHEN 325 MG/1
975 TABLET ORAL EVERY 8 HOURS PRN
Status: DISCONTINUED | OUTPATIENT
Start: 2025-04-13 | End: 2025-04-19 | Stop reason: HOSPADM

## 2025-04-13 RX ADMIN — CLINDAMYCIN PHOSPHATE 900 MG: 900 INJECTION, SOLUTION INTRAVENOUS at 13:35

## 2025-04-13 RX ADMIN — Medication 400 MG: at 13:35

## 2025-04-13 RX ADMIN — CLINDAMYCIN PHOSPHATE 900 MG: 900 INJECTION, SOLUTION INTRAVENOUS at 19:49

## 2025-04-13 RX ADMIN — Medication 400 MG: at 09:39

## 2025-04-13 RX ADMIN — CLINDAMYCIN PHOSPHATE 900 MG: 900 INJECTION, SOLUTION INTRAVENOUS at 05:09

## 2025-04-13 RX ADMIN — HYDROCHLOROTHIAZIDE 25 MG: 25 TABLET ORAL at 09:39

## 2025-04-13 RX ADMIN — ATORVASTATIN CALCIUM 40 MG: 40 TABLET, FILM COATED ORAL at 19:49

## 2025-04-13 RX ADMIN — LISINOPRIL 20 MG: 20 TABLET ORAL at 09:39

## 2025-04-13 ASSESSMENT — ACTIVITIES OF DAILY LIVING (ADL)
ADLS_ACUITY_SCORE: 37

## 2025-04-13 NOTE — PLAN OF CARE
"To Do:  End of Shift Summary  For vital signs and complete assessments, please see documentation flowsheets.     Pertinent assessments: A&Ox4. Bp elevated, other VSS on RA. POD 1 L foot debridement, denies numbness or tingling, extremity elevated, able to move foot without difficulty. Denies nausea, dizziness and SOB. Reports no pain, refused scheduled tylenol. ACHS blood sugar checks. Call light within reach and able to make needs known.     Major Shift Events None     Treatment Plan: Monitor BG. Cont to elevate L foot, monitor dressing and CMS.     Bedside Nurse: Maci Seymour RN                                                     Goal Outcome Evaluation:         Problem: Pain Acute  Goal: Optimal Pain Control and Function  Outcome: Progressing     Problem: Adult Inpatient Plan of Care  Goal: Plan of Care Review  Description: The Plan of Care Review/Shift note should be completed every shift.  The Outcome Evaluation is a brief statement about your assessment that the patient is improving, declining, or no change.  This information will be displayed automatically on your shiftnote.  Outcome: Progressing  Goal: Patient-Specific Goal (Individualized)  Description: You can add care plan individualizations to a care plan. Examples of Individualization might be:  \"Parent requests to be called daily at 9am for status\", \"I have a hard time hearing out of my right ear\", or \"Do not touch me to wake me up as it startlesme\".  Outcome: Progressing  Goal: Absence of Hospital-Acquired Illness or Injury  Outcome: Progressing  Intervention: Prevent Infection  Recent Flowsheet Documentation  Taken 4/12/2025 1854 by Maci Seymour RN  Infection Prevention: rest/sleep promoted  Goal: Optimal Comfort and Wellbeing  Outcome: Progressing  Intervention: Provide Person-Centered Care  Recent Flowsheet Documentation  Taken 4/12/2025 1854 by Maci Seymour RN  Trust Relationship/Rapport:   care explained   choices " provided  Goal: Readiness for Transition of Care  Outcome: Progressing     Problem: Diabetes  Goal: Optimal Coping  Outcome: Progressing  Goal: Optimal Functional Ability  Outcome: Progressing  Goal: Blood Glucose Level Within Target Range  Outcome: Progressing  Goal: Minimize Risk of Hypoglycemia  Outcome: Progressing     Problem: Wound  Goal: Optimal Coping  Outcome: Progressing  Goal: Optimal Functional Ability  Outcome: Progressing  Goal: Absence of Infection Signs and Symptoms  Outcome: Progressing  Goal: Improved Oral Intake  Outcome: Progressing  Goal: Optimal Pain Control and Function  Outcome: Progressing  Goal: Skin Health and Integrity  Outcome: Progressing  Goal: Optimal Wound Healing  Outcome: Progressing

## 2025-04-13 NOTE — PROGRESS NOTES
[facility]    Medicine Progress Note - Hospitalist Service    Date of Admission:  4/7/2025    Primary Care Physician   Physician No Ref-Primary  CONSULTANTS: podiatry     Assessment & Plan   Justino Giordano is a 65 year old male with a history of HTN, DM Type 2, HLD, Obesity who presents to the ED today with a left foot injury.        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  He has not been taking medications nor caring for his diabetes. .  He was afebrile, hypertensive, HR 90, RR 20.  WBC 16.4,  with negative lactic acid.  Left Foot Xray revealed extensive soft tissue gas within the dorsal forefoot along the second through fifth metacarpal heads/necks and surrounding the fifth MTP joint, highly concerning for necrotizing soft tissue infection. There is some localized osteopenia at the medial base of the fifth proximal phalanx which may be secondary to superimposition of the soft tissue gas. No discrete osseous destructive change. Minimal scattered degenerative arthritis.  He was placed on IV fluids and made NPO.  He was empirically placed on Vancomycin, Zosyn, Clindamycin. Wound Culture with gram stain with a  polymicrobial infection with culture showing laura and fusobacterium on admission and from surgery.  Will discontinue zosyn/vanco and continue on IV clindamycin, transition to oral when ok with podiatry.     Podiatry consulted and patient taken on 4/8/25 for left 5th ray resection with debridement.    Only to be weight bearing to heal on the left lower extremity, Cam boot per podiatry.  Patient works as a  for [employer]. Patient had more debridement per podiatry on 4/11, plan to go home with a wound vac.  Wound care per podiatry . Pain well  controlled, likely with severe neuropathy. Physical therapy to see to make sure he will be safe for home. Recommend outpatient physical therapy.  Patient had a normal ARASELI US of the lower extremity.      Noncompliance  Patient has not been taking any medications nor caring for his diabetes, hypertension.  This puts him at risk for readmissions, morbidity, and early mortality. We discussed the importance of taking care of his medical issues at length. Need to make medications and care as easy as possible for him so that he will be compliant.       Untreated and Uncontrolled type 2 diabetes, will be insulin dependent with peripheral neuropathy  Hgb A1C 11.7 (4/2021) and was 6.6 in (2013).  BS on admission 282 with hemoglobin A1c up to 12.3.  Bicarb 20, AG 19.  Not technically DKA by criteria.  Patient denies he has a history of diabetes, although reports he has had high blood sugars in the past.  No longer takes Metformin and never started Lantus as recommended in 2021. Given the elevated A1c above 10 will need insulin.  Was started on lantus and an insulin correction scale.  Needs close primary care provider follow up with diabetic education.   Blood sugar now < 200, better control 146-197.  Will increase lantus to 25 units.    continue lantus/prandial insulin with correction scale.     Needs ongoing insulin teaching per nursing     Hypertension, hyperlipidemia  -Previously on Atorvastatin, no longer takes this.  BP elevated 150-170s on arrival. , HDL 41, .  Will start on lipitor.   BP currently  140s-150s/80-90s  - monitor bp trend,  - Ace inhibitor added 4/9. Add hydrochlorothiazide. Repeat BMP in a am.  Will need to  follow his BMP and BP in a few weeks with a primary care provider which he needs to establish     Hyponatremia, Hypochloremia  Sodium when corrected for glucose is 131, chloride 88.  - IVF hydration, repeat sodium 128  -repeat bmp 4/11, sodium up to 132, creatinine normal    -repeat BMP in  "am     Discussed plan of care with nursing, and podiatry notes reviewed    Diet: Moderate Consistent Carb (60 g CHO per Meal) Diet    DVT Prophylaxis: Pneumatic Compression Devices  Nuñez Catheter: Not present  Lines: None     Cardiac Monitoring: None    RESTRAINTS: not indicated  Code Status: Full Code      This document was created using voice recognition technology.  Please excuse any typographical errors that may have occurred.  Please call with any questions.        Clinically Significant Risk Factors                    # Hypertension: Noted on problem list           # DMII: A1C = 12.3 % (Ref range: <5.7 %) within past 6 months   # Obesity: Estimated body mass index is 32.29 kg/m  as calculated from the following:    Height as of this encounter: 1.905 m (6' 3\").    Weight as of this encounter: 117.2 kg (258 lb 4.8 oz).      # Financial/Environmental Concerns: other (see comments) (pt expressed concerns about being off of work and without pay)   complicates all aspects of his care       Disposition Plan     Expected Discharge Date: 04/14/2025      Destination: home with help/services            Barrier to discharge: podiatry surgery, IV antibiotics   Medically Ready for Discharge: Anticipated Tomorrow depends on surgical interventions.  Wound vac needs to be place, timing per podiatry.  Goal is home with home care       Bernardo Valdivia MD  Hospitalist Service  [facility]  Securely message with [employer] (more info)  Text page via [employer] Paging/Directory   ______________________________________________________________________    Interval History   Patient without any changes. BS and BP up still.  No pain.  Awaiting wound vac placement.          ROS: A comprehensive review of systems was negative except for items noted in the HPI.  Patient currently denies any fever, chills, sweats, nausea, vomiting, diarrhea, shortness of breath, or chest pain.       Physical Exam   Vital Signs: Temp: 99.6  F " (37.6  C) Temp src: Oral BP: (!) 140/85 Pulse: 75   Resp: 16 SpO2: 92 % O2 Device: None (Room air)    Weight: 258 lbs 4.8 oz    Exam stable from yesterday     General: Alert, interactive, NAD, lying in bed, appears stated age  HEENT: MMM   Extremities/MSK/Skin:  left foot dressing ace wrap, c/d/I not removed by me  Neuro: Alert & oriented x 3, no focal deficits      Data     I have personally reviewed the following data over the past 24 hrs:    12.0 (H)  \   N/A   / N/A     N/A N/A N/A /  146 (H)   N/A N/A N/A \       Imaging:   Results for orders placed or performed during the hospital encounter of 04/07/25   Foot XR, G/E 3 views, left    Narrative    EXAM: XR FOOT LEFT G/E 3 VIEWS  LOCATION: [facility]  DATE: 4/7/2025    INDICATION: left foot wound  COMPARISON: None.      Impression    IMPRESSION: Extensive soft tissue gas within the dorsal forefoot along the second through fifth metacarpal heads/necks and surrounding the fifth MTP joint, highly concerning for necrotizing soft tissue infection. There is some localized osteopenia at the   medial base of the fifth proximal phalanx which may be secondary to superimposition of the soft tissue gas. No discrete osseous destructive change. Minimal scattered degenerative arthritis. Plantar calcaneal spur. Benign dystrophic calcifications along   the course of the plantar fascia. Arterial calcifications.    NOTE: ABNORMAL REPORT    THE DICTATION ABOVE DESCRIBES AN ABNORMALITY FOR WHICH FOLLOW-UP IS NEEDED.    MR Foot Left w/o Contrast     Value    Radiologist flags Presumed necrotizing infection, in the left foot,    Narrative    Exam: MRI of the left forefoot and midfoot without contrast dated  4/7/2025.    COMPARISON: Radiographs from the same day.    CLINICAL HISTORY: Evaluate for osteomyelitis in patient with fifth  digit infection.    TECHNIQUE: Multiplanar, multisequence MR imaging of the left forefoot  and midfoot was obtained using standard  sequences in 3 orthogonal  planes without the use of intravenous or intra-articular gadolinium  contrast.    FINDINGS:    As seen on the plain radiographs, there is extensive air within the  subcutaneous soft tissues along the dorsal aspect of the left foot,  dorsal to the third through fifth distal metatarsals, greatest along  the dorsal aspect of the distal fifth metatarsal, at the fifth  metatarsophalangeal joint. Subtle bone marrow edema in the distal  fifth metatarsal as well as in the proximal to mid aspect of the fifth  toe proximal phalanx, with no significant low T1 signal. No  significant fluid at the left fifth metatarsophalangeal joint.     Scattered degenerative changes with subchondral edema including at the  distal first metatarsophalangeal joint as well as at the navicular  cuneiform joint.    The Lisfranc ligament is intact and the Lisfranc joint is intact.    Minimal fluid in the third intermetatarsal bursa.    Mild fatty infiltration within the muscles about the foot with  extensive soft tissue edema. No discrete abscess although contrast was  not administered, mildly limiting evaluation.    The tendinous structures are grossly intact. No intermetatarsal  masses.      Impression    IMPRESSION:  1. Extensive presumed necrotizing infection within the soft tissues of  the left forefoot/midfoot with soft tissue defect seen along the  dorsal lateral aspect of the foot. There is extensive gas formation,  as seen on the radiographs, extending along the dorsal aspect of the  third through fifth metatarsals, greatest at the fifth  metatarsophalangeal joint. No significant joint effusion at the fifth  metatarsophalangeal joint to suggest septic joint. There is reactive  bone marrow edema in the distal fifth metatarsal, and in the fifth toe  proximal phalanx without definite corresponding low T1 signal,  findings presumed to represent osteitis however early osteomyelitis  cannot be excluded. Correlate with  clinical exam.    2. Diffuse soft tissue edema within the subcutaneous tissues and  within the muscles of the left forefoot/midfoot, most likely  representing a cellulitis/myositis. No discrete abscess although  contrast was not administered.    3. Degenerative changes at the navicular cuneiform joint as well as  first metatarsophalangeal joint.    [Consider Follow Up: Presumed necrotizing infection, in the left foot,  as above]    This report will be copied to the [facility] to ensure a  provider acknowledges the finding. [facility] is available Monday  through Friday 8am-3:30 pm.       BRIEN MICHAELS MD         SYSTEM ID:  [id number]     Procedures: 4/8/25 per podiatry  PROCEDURE:     1. Left foot fifth ray resection   2. Left foot excisional debridement down to and including tendon for site measuring 6 cm x 4 cm x 2 cm     Per podiatry 4/11:    1.  Left foot fifth metatarsal resection   2.  Left foot excisional debridement down to and including tendon, site measuring 7 cm x 4 cm x 2 cm     I have personally have reviewed the patient's most up to labs, orders, and medications myself

## 2025-04-13 NOTE — PLAN OF CARE
"A&Ox4. Heel weight bearing to LLE. Dressing CDI, denies numbness, tingling or pain. Extremity elevated. IV abx infused.     Goal Outcome Evaluation:      Plan of Care Reviewed With: patient    Overall Patient Progress: improving    Outcome Evaluation: Monitor BD. Elevate LLE, monitor dressing and CMS    Problem: Pain Acute  Goal: Optimal Pain Control and Function  4/13/2025 0212 by Lizbet Segura RN  Outcome: Progressing  Problem: Adult Inpatient Plan of Care  Goal: Plan of Care Review  Description: The Plan of Care Review/Shift note should be completed every shift.  The Outcome Evaluation is a brief statement about your assessment that the patient is improving, declining, or no change.  This information will be displayed automatically on your shiftnote.  4/13/2025 0212 by Lizbet Segura RN  Outcome: Progressing  Flowsheets (Taken 4/13/2025 0212)  Outcome Evaluation: Monitor BD. Elevate LLE, monitor dressing and CMS  Plan of Care Reviewed With: patient  Goal: Patient-Specific Goal (Individualized)  Description: You can add care plan individualizations to a care plan. Examples of Individualization might be:  \"Parent requests to be called daily at 9am for status\", \"I have a hard time hearing out of my right ear\", or \"Do not touch me to wake me up as it startlesme\".  4/13/2025 0212 by Lizbet Segura RN  Outcome: Progressing  Goal: Absence of Hospital-Acquired Illness or Injury  4/13/2025 0212 by Lizbet Segura RN  Outcome: Progressing  Intervention: Prevent Infection  Recent Flowsheet Documentation  Taken 4/13/2025 0000 by Lizbet Segura RN  Infection Prevention: rest/sleep promoted  Goal: Optimal Comfort and Wellbeing  4/13/2025 0212 by Lizbet Segura RN  Outcome: Progressing  Intervention: Provide Person-Centered Care  Recent Flowsheet Documentation  Taken 4/13/2025 0000 by Lizbet Segura RN  Trust Relationship/Rapport:   care explained   choices provided  Goal: Readiness for Transition of " Care  4/13/2025 0212 by Lizbet Segura RN  Outcome: Progressing  Problem: Diabetes  Goal: Optimal Coping  4/13/2025 0212 by Lizbet Segura RN  Outcome: Progressing  Goal: Optimal Functional Ability  4/13/2025 0212 by Lizbet Segura RN  Outcome: Progressing  Intervention: Optimize Functional Ability  Recent Flowsheet Documentation  Taken 4/13/2025 0000 by Lizbet Segura RN  Assistive Device Utilized:   walker   gait belt  Activity Assistance Provided: assistance, stand-by  Goal: Blood Glucose Level Within Target Range  4/13/2025 0212 by Lizbet Segura RN  Outcome: Progressing  Goal: Minimize Risk of Hypoglycemia  4/13/2025 0212 by Lizbet Segura RN  Outcome: Progressing  Problem: Wound  Goal: Optimal Coping  4/13/2025 0212 by Lizbet Segura RN  Outcome: Progressing  Goal: Optimal Functional Ability  4/13/2025 0212 by Lizbet Segura RN  Outcome: Progressing  Intervention: Optimize Functional Ability  Recent Flowsheet Documentation  Taken 4/13/2025 0000 by Lizbet Segura RN  Assistive Device Utilized:   walker   gait belt  Activity Assistance Provided: assistance, stand-by  Goal: Absence of Infection Signs and Symptoms  4/13/2025 0212 by Lizbet Segura RN  Outcome: Progressing  Goal: Improved Oral Intake  4/13/2025 0212 by Lizbet Segura RN  Outcome: Progressing  Goal: Optimal Pain Control and Function  4/13/2025 0212 by Lizbet Segura RN  Outcome: Progressing  Goal: Skin Health and Integrity  4/13/2025 0212 by Lizbet Segura RN  Outcome: Progressing  Goal: Optimal Wound Healing  4/13/2025 0212 by Lizbet Segura RN  Outcome: Progressing

## 2025-04-13 NOTE — PLAN OF CARE
"Pertinent assessments: A&O, up sba with walker for stability. Pt denies pain sob or nausea. Education given regarding proper insulin injection protocol, and when to check BG. L extremity elevated at all times, pt able to flex and extend L foot without difficulty.     Major Shift Events:  and 165. Mag replaced.     Treatment Plan: Podiatry following, wound vac placement at discharge. Cont diabetic education.     Bedside Nurse: Katiana Stein RN     Problem: Pain Acute  Goal: Optimal Pain Control and Function  Outcome: Progressing     Problem: Adult Inpatient Plan of Care  Goal: Plan of Care Review  Description: The Plan of Care Review/Shift note should be completed every shift.  The Outcome Evaluation is a brief statement about your assessment that the patient is improving, declining, or no change.  This information will be displayed automatically on your shiftnote.  Outcome: Progressing  Flowsheets (Taken 4/13/2025 5643)  Outcome Evaluation: Treatment Plan: Podiatry following, wound vac placement at discharge. Cont diabetic education.  Plan of Care Reviewed With: patient  Overall Patient Progress: improving  Goal: Patient-Specific Goal (Individualized)  Description: You can add care plan individualizations to a care plan. Examples of Individualization might be:  \"Parent requests to be called daily at 9am for status\", \"I have a hard time hearing out of my right ear\", or \"Do not touch me to wake me up as it startlesme\".  Outcome: Progressing  Goal: Absence of Hospital-Acquired Illness or Injury  Outcome: Progressing  Intervention: Identify and Manage Fall Risk  Recent Flowsheet Documentation  Taken 4/13/2025 4686 by Katiana Stein, RN  Safety Promotion/Fall Prevention:   nonskid shoes/slippers when out of bed   increased rounding and observation   increase visualization of patient   activity supervised  Intervention: Prevent and Manage VTE (Venous Thromboembolism) Risk  Recent Flowsheet " Documentation  Taken 4/13/2025 0953 by Katiana Stein RN  VTE Prevention/Management: SCDs off (sequential compression devices)  Intervention: Prevent Infection  Recent Flowsheet Documentation  Taken 4/13/2025 0953 by Katiana Setin RN  Infection Prevention: rest/sleep promoted  Goal: Optimal Comfort and Wellbeing  Outcome: Progressing  Goal: Readiness for Transition of Care  Outcome: Progressing     Problem: Diabetes  Goal: Optimal Coping  Outcome: Progressing  Goal: Optimal Functional Ability  Outcome: Progressing  Goal: Blood Glucose Level Within Target Range  Outcome: Progressing  Goal: Minimize Risk of Hypoglycemia  Outcome: Progressing     Problem: Wound  Goal: Optimal Coping  Outcome: Progressing  Goal: Optimal Functional Ability  Outcome: Progressing  Goal: Absence of Infection Signs and Symptoms  Outcome: Progressing  Goal: Improved Oral Intake  Outcome: Progressing  Goal: Optimal Pain Control and Function  Outcome: Progressing  Goal: Skin Health and Integrity  Outcome: Progressing  Goal: Optimal Wound Healing  Outcome: Progressing   Goal Outcome Evaluation:      Plan of Care Reviewed With: patient    Overall Patient Progress: improvingOverall Patient Progress: improving    Outcome Evaluation: Treatment Plan: Podiatry following, wound vac placement at discharge. Cont diabetic education.

## 2025-04-13 NOTE — PROGRESS NOTES
Podiatry / Foot and Ankle Surgery Progress Note    April 13, 2025    Did not see patient today.  We are awaiting pathology results and wound VAC was ordered to be placed for Monday.  Will follow-up once pathology results are available.      Bria Pagan DPM, Podiatry/Foot and Ankle Surgery  11:09 AM

## 2025-04-14 ENCOUNTER — APPOINTMENT (OUTPATIENT)
Dept: PHYSICAL THERAPY | Facility: CLINIC | Age: 65
End: 2025-04-14
Payer: MEDICARE

## 2025-04-14 ENCOUNTER — TELEPHONE (OUTPATIENT)
Dept: WOUND CARE | Facility: CLINIC | Age: 65
End: 2025-04-14
Payer: MEDICARE

## 2025-04-14 LAB
ANION GAP SERPL CALCULATED.3IONS-SCNC: 11 MMOL/L (ref 7–15)
BUN SERPL-MCNC: 6 MG/DL (ref 8–23)
CALCIUM SERPL-MCNC: 8.5 MG/DL (ref 8.8–10.4)
CHLORIDE SERPL-SCNC: 97 MMOL/L (ref 98–107)
CREAT SERPL-MCNC: 0.76 MG/DL (ref 0.67–1.17)
CRP SERPL-MCNC: 101.25 MG/L
EGFRCR SERPLBLD CKD-EPI 2021: >90 ML/MIN/1.73M2
GLUCOSE BLDC GLUCOMTR-MCNC: 115 MG/DL (ref 70–99)
GLUCOSE BLDC GLUCOMTR-MCNC: 126 MG/DL (ref 70–99)
GLUCOSE BLDC GLUCOMTR-MCNC: 158 MG/DL (ref 70–99)
GLUCOSE BLDC GLUCOMTR-MCNC: 178 MG/DL (ref 70–99)
GLUCOSE BLDC GLUCOMTR-MCNC: 221 MG/DL (ref 70–99)
GLUCOSE SERPL-MCNC: 192 MG/DL (ref 70–99)
HCO3 SERPL-SCNC: 23 MMOL/L (ref 22–29)
HOLD SPECIMEN: NORMAL
MAGNESIUM SERPL-MCNC: 1.8 MG/DL (ref 1.7–2.3)
PATH REPORT.COMMENTS IMP SPEC: NORMAL
PATH REPORT.COMMENTS IMP SPEC: NORMAL
PATH REPORT.FINAL DX SPEC: NORMAL
PATH REPORT.GROSS SPEC: NORMAL
PATH REPORT.MICROSCOPIC SPEC OTHER STN: NORMAL
PATH REPORT.RELEVANT HX SPEC: NORMAL
PHOSPHATE SERPL-MCNC: 3.6 MG/DL (ref 2.5–4.5)
PHOTO IMAGE: NORMAL
POTASSIUM SERPL-SCNC: 3.7 MMOL/L (ref 3.4–5.3)
SODIUM SERPL-SCNC: 131 MMOL/L (ref 135–145)

## 2025-04-14 PROCEDURE — 250N000013 HC RX MED GY IP 250 OP 250 PS 637: Performed by: INTERNAL MEDICINE

## 2025-04-14 PROCEDURE — 120N000001 HC R&B MED SURG/OB

## 2025-04-14 PROCEDURE — 250N000011 HC RX IP 250 OP 636: Performed by: PHYSICIAN ASSISTANT

## 2025-04-14 PROCEDURE — G0463 HOSPITAL OUTPT CLINIC VISIT: HCPCS | Mod: 25

## 2025-04-14 PROCEDURE — 84100 ASSAY OF PHOSPHORUS: CPT | Performed by: INTERNAL MEDICINE

## 2025-04-14 PROCEDURE — 99232 SBSQ HOSP IP/OBS MODERATE 35: CPT | Performed by: INTERNAL MEDICINE

## 2025-04-14 PROCEDURE — 86140 C-REACTIVE PROTEIN: CPT | Performed by: PODIATRIST

## 2025-04-14 PROCEDURE — 36415 COLL VENOUS BLD VENIPUNCTURE: CPT | Performed by: INTERNAL MEDICINE

## 2025-04-14 PROCEDURE — 97530 THERAPEUTIC ACTIVITIES: CPT | Mod: GP | Performed by: PHYSICAL THERAPIST

## 2025-04-14 PROCEDURE — 83735 ASSAY OF MAGNESIUM: CPT | Performed by: INTERNAL MEDICINE

## 2025-04-14 PROCEDURE — 80048 BASIC METABOLIC PNL TOTAL CA: CPT | Performed by: INTERNAL MEDICINE

## 2025-04-14 PROCEDURE — 88305 TISSUE EXAM BY PATHOLOGIST: CPT | Mod: 26 | Performed by: PATHOLOGY

## 2025-04-14 PROCEDURE — 88311 DECALCIFY TISSUE: CPT | Mod: 26 | Performed by: PATHOLOGY

## 2025-04-14 PROCEDURE — 97605 NEG PRS WND THER DME<=50SQCM: CPT

## 2025-04-14 RX ORDER — MAGNESIUM OXIDE 400 MG/1
400 TABLET ORAL EVERY 4 HOURS
Status: COMPLETED | OUTPATIENT
Start: 2025-04-14 | End: 2025-04-14

## 2025-04-14 RX ADMIN — ATORVASTATIN CALCIUM 40 MG: 40 TABLET, FILM COATED ORAL at 20:10

## 2025-04-14 RX ADMIN — CLINDAMYCIN PHOSPHATE 900 MG: 900 INJECTION, SOLUTION INTRAVENOUS at 20:10

## 2025-04-14 RX ADMIN — Medication 400 MG: at 06:15

## 2025-04-14 RX ADMIN — HYDROCHLOROTHIAZIDE 25 MG: 25 TABLET ORAL at 10:00

## 2025-04-14 RX ADMIN — CLINDAMYCIN PHOSPHATE 900 MG: 900 INJECTION, SOLUTION INTRAVENOUS at 13:05

## 2025-04-14 RX ADMIN — CLINDAMYCIN PHOSPHATE 900 MG: 900 INJECTION, SOLUTION INTRAVENOUS at 04:27

## 2025-04-14 RX ADMIN — LISINOPRIL 20 MG: 20 TABLET ORAL at 09:59

## 2025-04-14 ASSESSMENT — ACTIVITIES OF DAILY LIVING (ADL)
ADLS_ACUITY_SCORE: 37
ADLS_ACUITY_SCORE: 37
ADLS_ACUITY_SCORE: 41
ADLS_ACUITY_SCORE: 44
ADLS_ACUITY_SCORE: 41
ADLS_ACUITY_SCORE: 37
ADLS_ACUITY_SCORE: 41
ADLS_ACUITY_SCORE: 37
ADLS_ACUITY_SCORE: 41
ADLS_ACUITY_SCORE: 41
ADLS_ACUITY_SCORE: 37
ADLS_ACUITY_SCORE: 37
ADLS_ACUITY_SCORE: 41
ADLS_ACUITY_SCORE: 37
ADLS_ACUITY_SCORE: 41
ADLS_ACUITY_SCORE: 44
ADLS_ACUITY_SCORE: 37
ADLS_ACUITY_SCORE: 44
ADLS_ACUITY_SCORE: 37

## 2025-04-14 NOTE — PROGRESS NOTES
"Podiatry / Foot and Ankle Surgery Progress Note    April 14, 2025    Subject: Patient was seen at bedside for his left foot. Has undergone two procedures for gas gangrene. States     Objective:  Vitals: BP (!) 146/88 (BP Location: Left arm)   Pulse 75   Temp 98  F (36.7  C) (Oral)   Resp 16   Ht 1.905 m (6' 3\")   Wt 117.2 kg (258 lb 4.8 oz)   SpO2 96%   BMI 32.29 kg/m    BMI= Body mass index is 32.29 kg/m .    WBC Count   Date Value Ref Range Status   04/12/2025 12.0 (H) 4.0 - 11.0 10e3/uL Final     Hemaglobin:12.9 (4/8/2025)    A1C: 12.3 (4/7/2025)    General:  Patient is alert and orientated.  NAD.    Vascular:  DP and PT pulses are faintly palpable.  No varicosities noted  CFT's < 3secs.  Skin temp is normal.     Neuro:  Light and gross touch sensation is absent to feet.     Derm:  Dressing is c/d/I. Sutures intact. Wound bed large and with exposed tendons. No purulence. Cellulitis resolved. Some necrosis plantarly.     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     Cultures:  1+ Fusobacterium species Abnormal      Pathology (4/11/2025): pending    Assessment: 65-year-old uncontrolled diabetic male status post left fifth ray resection and incision and debridement of the left foot with bone biopsy.    Plan:    - Dressing was changed at bedside.  -Large dorsal soft tissue defect. Plan for wound vac to be applied.     -Pathology pending to determine if further surgery is needed, specifically specimen from 4/11, which is the most proximal piece.     -Will follow up with patient with pathology available.     -Discussed with care management. Plan for wound vac and [facility].     -WB to heel only of CHARLENE Preciado DPM                  "

## 2025-04-14 NOTE — DISCHARGE INSTRUCTIONS
Negative pressure wound therapy plan:  Wound location:  L foot   Change Days: Mon/Wed/Fri   Supplies (including all accessories) used: medium Black foam , Adaptic/Curity oil emulsion contact layer , Adapt barrier ring, and Cavilon no sting barrier film  Cleanse with Vashe prior to replacing NPWT  Suction setting: -125   Methods used: Window paned all periwound skin with vac drape prior to applying sponge, Cut foam in half to reduce profile, and Placed barrier ring into periwound creases to improve seal

## 2025-04-14 NOTE — PROGRESS NOTES
Care Management Follow Up    Length of Stay (days): 7    Expected Discharge Date: 04/15/2025     Concerns to be Addressed: discharge planning     Patient plan of care discussed at interdisciplinary rounds: Yes    Anticipated Discharge Disposition: Home Care     Anticipated Discharge Services: Home Care  Anticipated Discharge DME: Wound Vac    Education Provided on the Discharge Plan:  yes  Patient/Family in Agreement with the Plan:  yes    Referrals Placed by CM/SW: Homecare  Private pay costs discussed: Not applicable    Discussed  Partnership in Safe Discharge Planning  document with patient/family: No     Handoff Completed: Yes, [facility] PCP: Internal handoff referral completed    Additional Information:  Patient accepted by [facility] for home RN for wound vac changes and PT. They can do start of care as early as Wed 4/16.  Discussed discharge with Dr Preciado this am. She is willing to be the MD for home care until PCP is established. Patient will also have follow up at the [facility] in [place].  PCP appointment set up:  Apr 21, 2025 9:30 AM  (Arrive by 9:10 AM)  New ED/Hospital Follow Up with Adrian Ellison PA-C  [facility] [id number] [id number] [phone]     Home wound vac authorization process has been started and waiting on approval.  Have asked MD to order diabetic testing device and supplies to begin teaching by bedside RN    CM will continue to follow for discharge planning.    Addendum 1315:  Home wound vac approved. Will apply [employer] vac from supply on day of discharge    Carey Gu RN BSN OCN  Care Coordinator  [facility]  [phone]

## 2025-04-14 NOTE — CONSULTS
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  Wound Vac applied on 4/14 and plan to go home with a wound vac.     [facility] nurse follow-up plan: Monday/WednesdayFriday    Patient History (according to provider note(s):      \"Patient presented with a left foot wound that developed after he used a razor blade to shave a callus on his left foot one week prior to admission. He has not been taking medications nor caring for his diabetes. Left Foot Xray revealed extensive soft tissue gas within the dorsal forefoot along the second through fifth metacarpal heads/necks and surrounding the fifth MTP joint, highly concerning for necrotizing soft tissue infection. There is some localized osteopenia at the medial base of the fifth proximal phalanx which may be secondary to superimposition of the soft tissue gas. No discrete osseous destructive change. Minimal scattered degenerative arthritis. Podiatry consulted and patient taken on 4/8/25 for left 5th ray resection with debridement.    Only to be weight bearing to heal on the left lower extremity, Cam boot per podiatry.\"    Assessment:      Areas visualized during today's visit: Focused:, Foot, and Left    Negative pressure wound therapy applied to: L foot      Last photo: 4/14   Wound due to: Trauma/ Used a razor blade to shave a callus   Wound history/plan of care:    Surgical date: 4/8  Service following: Podiatry and WOC   Date Negative Pressure Wound Therapy initiated: 4/14   Interventions in place: offloading and elevation  Is patient s nutritional status compromised? no   If yes, what interventions are in place? N/A  Reason for initiating vac therapy? High risk of infections and Need for accelerated granulation tissue  Which?of?the?following?co-morbidities?apply? Diabetes and Obesity  If " diabetic is patient on a diabetic management program? Yes   Is osteomyelitis present in wound? no   If yes what treatments are in place? IV antibiotic cleocin    Wound base: 40 % Fibrin, Moist, Smooth , and Yellow, 20 % Tendon , 40 % Moist, red tissue     Palpation of the wound bed: normal       Drainage: moderate      Volume in cannister: N/A      Last cannister change date: 4/14     Description of drainage: none      Measurements (length x width x depth, in cm) 8  x 6.5  x  ~0.7 cm       Tunneling N/A      Undermining N/A   Periwound skin: Intact and Dry/scaly,       Color: dusky and black and pink        Temperature: cool   Odor: none   Pain: mild, no grimacing or signs of discomfort, tension to hands, feet and body, facial expression of distress, and during dressing change, aching   Pain intervention prior to dressing change: patient tolerated well, slow and gentle cares , and distraction  Treatment goal: Drainage control, Infection control/prevention, and Increase granulation  STATUS: initial assessment   Supplies ordered: gathered, supplies stored on unit, and discussed with patient     Number of foam pieces removed from a wound (excluding foam for bridge) :  N/A    Verified this matched the number of foam pieces applied last dressing change: N/A   Number of foam pieces packed into wound (excluding foam for bridge) :  1 GranuFoam Black ( and 2 pieces of Adaptic contact dressing on the wound)       Treatment Plan:     Negative pressure wound therapy plan:  Wound location:  L foot   Change Days: Mon/Wed/Fri by WOC RN    Supplies (including all accessories) used: medium Black foam , Adaptic/Curity oil emulsion contact layer , Adapt barrier ring, and Cavilon no sting barrier film  Cleanse with Vashe prior to replacing NPWT  Suction setting: -125   Methods used: Window paned all periwound skin with vac drape prior to applying sponge, Cut foam in half to reduce profile, and Placed barrier ring into periwound creases  "to improve seal    Staff RN to assess integrity of dressing and ensure suction is set at appropriate level every shift.   Date canister. Chart canister output every shift. Change cannister weekly and PRN if full/occluded     Remove foam dressing and replace with BID normal saline moist gauze dressing if:   -a dressing failure which cannot be repaired within 2 hours   -patient is discharging to home without a home pump   -patient is discharging to a facility outside the local area   -if a dressing is a \"Silver Foam\", remove before Radiation Therapy or MRI     The hospital VAC pump is not to be discharged with the patient. Please disconnect the patient from the machine prior to discharge.  If a home VAC pump has been delivered, connect the home cannister to dressing tubing and the cannister to the home pump, turn on home pump  If the patient is transferring to a nearby facility with a VAC, the tubing can be disconnected, clamp tubing and cover the end with a glove, then can be reconnected if within 2 hours  If transfer will be longer than 2 hours, dressing must be removed and placed with a wet to moist gauze dressing for transfer      Orders: Written    RECOMMEND PRIMARY TEAM ORDER: None, at this time  Education provided: plan of care, wound progress, Infection prevention , and Off-loading pressure  Discussed plan of care with: Patient  Notify WOC if wound(s) deteriorate.  Nursing to notify the Provider(s) and re-consult the WOC Nurse if new skin concern.    DATA:     Current support surface: Standard  Standard gel mattress (Isoflex)  Containment of urine/stool: Continent of bladder and Continent of bowel  BMI: Body mass index is 32.29 kg/m .   Active diet order: Orders Placed This Encounter      Moderate Consistent Carb (60 g CHO per Meal) Diet     Output: I/O last 3 completed shifts:  In: 806 [P.O.:800; I.V.:6]  Out: -      Labs:   Recent Labs   Lab 04/12/25  1013 04/08/25  0609   HGB  --  12.9*   WBC 12.0* 14.4* "     Pressure injury risk assessment:   Sensory Perception: 4-->no impairment  Moisture: 4-->rarely moist  Activity: 3-->walks occasionally  Mobility: 3-->slightly limited  Nutrition: 3-->adequate  Friction and Shear: 3-->no apparent problem  Robel Score: 20    Tonya Vvieros, RN   Pager no longer is use, please contact through [employer] group: [facility] Nurse [place]

## 2025-04-14 NOTE — TELEPHONE ENCOUNTER
Returned call to Joanne and discussed that Dr. Preciado will follow for wound care for home care. No further questions or concerns.

## 2025-04-14 NOTE — TELEPHONE ENCOUNTER
[facility] VASCULAR [facility] CENTER    Who is the name of the provider?:  Ilana    What is the location you see this provider at/preferred location?: [facility] [place] [facility]  Person calling / Facility: [facility]   Phone number:  [phone]  Nurse call back needed:  YES   Can we leave a detailed message on this number?  YES     Reason for call:  Requesting verbal confirmation that Dr. Preciado will follow patient for home care.     Pharmacy location:  NA

## 2025-04-14 NOTE — PROGRESS NOTES
[facility]    Medicine Progress Note - Hospitalist Service    Date of Admission:  4/7/2025    Primary Care Physician   Physician No Ref-Primary  CONSULTANTS: podiatry     Assessment & Plan   Justino Giordano is a 65 year old male with a history of HTN, DM Type 2, HLD, Obesity who presents to the ED today with a left foot injury.        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  He has not been taking medications nor caring for his diabetes. .  He was afebrile, hypertensive, HR 90, RR 20.  WBC 16.4,  with negative lactic acid.  Left Foot Xray revealed extensive soft tissue gas within the dorsal forefoot along the second through fifth metacarpal heads/necks and surrounding the fifth MTP joint, highly concerning for necrotizing soft tissue infection. There is some localized osteopenia at the medial base of the fifth proximal phalanx which may be secondary to superimposition of the soft tissue gas. No discrete osseous destructive change. Minimal scattered degenerative arthritis.  He was placed on IV fluids and made NPO.  He was empirically placed on Vancomycin, Zosyn, Clindamycin. Wound Culture with gram stain with a  polymicrobial infection with culture showing laura and fusobacterium on admission and from surgery.  Will discontinue zosyn/vanco and continue on IV clindamycin, transition to oral when ok with podiatry, awaiting pathology to see if he requires further srugery/debridement.     Podiatry consulted and patient taken on 4/8/25 for left 5th ray resection with debridement.    Only to be weight bearing to heal on the left lower extremity, Cam boot per podiatry.  Patient works as a  for [email or web address]. Patient had more debridement per podiatry on 4/11, plan to go  home with a wound vac, social work working on.  Wound care per podiatry . Pain well controlled, likely with severe neuropathy. Physical therapy to see to make sure he will be safe for home. Recommend outpatient physical therapy.  Patient had a normal ARASELI US of the lower extremity.      Noncompliance  Patient has not been taking any medications nor caring for his diabetes, hypertension.  This puts him at risk for readmissions, morbidity, and early mortality. We discussed the importance of taking care of his medical issues at length. Need to make medications and care as easy as possible for him so that he will be compliant.       Untreated and Uncontrolled type 2 diabetes, will be insulin dependent with peripheral neuropathy  Hgb A1C 11.7 (4/2021) and was 6.6 in (2013).  BS on admission 282 with hemoglobin A1c up to 12.3.  Bicarb 20, AG 19.  Not technically DKA by criteria.  Patient denies he has a history of diabetes, although reports he has had high blood sugars in the past.  No longer takes Metformin and never started Lantus as recommended in 2021. Given the elevated A1c above 10 will need insulin.  Was started on lantus and an insulin correction scale.  Needs close primary care provider follow up with diabetic education.   Blood sugar 200s, continue on lantus and increase his prandial dosing.  I doubt with his compliance issues that he can do carb counting.     Needs ongoing insulin teaching per nursing     Hypertension, hyperlipidemia  -Previously on Atorvastatin, no longer takes this.  BP elevated 150-170s on arrival. , HDL 41, .  Will start on lipitor.   BP currently  138-150s/80s  - monitor bp trend,  - Ace inhibitor added 4/9. Added hydrochlorothiazide. 4/13, will need to  follow his BMP and BP in a few weeks with a primary care provider which he needs to establish     Hyponatremia, Hypochloremia  Sodium when corrected for glucose is 131, chloride 88.  - IVF hydration now off of IV fluids  "  -repeat BMP in am showing sodium stable 131. Creatinine normal      Discussed plan of care with nursing, and podiatry notes reviewed, social work/case management    Diet: Moderate Consistent Carb (60 g CHO per Meal) Diet    DVT Prophylaxis: Pneumatic Compression Devices  Nuñez Catheter: Not present  Lines: None     Cardiac Monitoring: None    RESTRAINTS: not indicated  Code Status: Full Code      This document was created using voice recognition technology.  Please excuse any typographical errors that may have occurred.  Please call with any questions.        Clinically Significant Risk Factors          # Hyponatremia: Lowest Na = 131 mmol/L in last 2 days, will monitor as appropriate  # Hypochloremia: Lowest Cl = 97 mmol/L in last 2 days, will monitor as appropriate          # Hypertension: Noted on problem list           # DMII: A1C = 12.3 % (Ref range: <5.7 %) within past 6 months   # Obesity: Estimated body mass index is 32.29 kg/m  as calculated from the following:    Height as of this encounter: 1.905 m (6' 3\").    Weight as of this encounter: 117.2 kg (258 lb 4.8 oz).      # Financial/Environmental Concerns: other (see comments) (pt expressed concerns about being off of work and without pay)   complicates all aspects of his care       Disposition Plan      Expected Discharge Date: 04/15/2025      Destination: home with help/services            Barrier to discharge: podiatry surgery, IV antibiotics   Medically Ready for Discharge: Anticipated Tomorrow depends on surgical interventions pathology and need for further surgery, and needs wound vac set up.  Will go home with home care when time is right       Bernardo Valdivia MD  Hospitalist Service  [facility]  Securely message with [employer] (more info)  Text page via [employer] Paging/Directory   ______________________________________________________________________    Interval History   Patient without any changes. BS up, blood pressure up " still too.  No pain.  Awaiting wound vac placement. Need pathology results to determine if further debridement is needed         ROS: A comprehensive review of systems was negative except for items noted in the HPI.  Patient currently denies any fever, chills, sweats, nausea, vomiting, diarrhea, shortness of breath, or chest pain.       Physical Exam   Vital Signs: Temp: 98  F (36.7  C) Temp src: Oral BP: (!) 146/88 Pulse: 75   Resp: 16 SpO2: 96 % O2 Device: None (Room air)    Weight: 258 lbs 4.8 oz    Exam stable from yesterday     General: Alert, interactive, NAD, lying in bed, appears stated age  HEENT: MMM   Extremities/MSK/Skin:  left foot dressing ace wrap, c/d/I not removed by me  Neuro: Alert & oriented x 3, no focal deficits      Data     I have personally reviewed the following data over the past 24 hrs:    N/A  \   N/A   / N/A     131 (L) 97 (L) 6.0 (L) /  192 (H)   3.7 23 0.76 \       Imaging:   Results for orders placed or performed during the hospital encounter of 04/07/25   Foot XR, G/E 3 views, left    Narrative    EXAM: XR FOOT LEFT G/E 3 VIEWS  LOCATION: [facility]  DATE: 4/7/2025    INDICATION: left foot wound  COMPARISON: None.      Impression    IMPRESSION: Extensive soft tissue gas within the dorsal forefoot along the second through fifth metacarpal heads/necks and surrounding the fifth MTP joint, highly concerning for necrotizing soft tissue infection. There is some localized osteopenia at the   medial base of the fifth proximal phalanx which may be secondary to superimposition of the soft tissue gas. No discrete osseous destructive change. Minimal scattered degenerative arthritis. Plantar calcaneal spur. Benign dystrophic calcifications along   the course of the plantar fascia. Arterial calcifications.    NOTE: ABNORMAL REPORT    THE DICTATION ABOVE DESCRIBES AN ABNORMALITY FOR WHICH FOLLOW-UP IS NEEDED.    MR Foot Left w/o Contrast     Value    Radiologist flags  Presumed necrotizing infection, in the left foot,    Narrative    Exam: MRI of the left forefoot and midfoot without contrast dated  4/7/2025.    COMPARISON: Radiographs from the same day.    CLINICAL HISTORY: Evaluate for osteomyelitis in patient with fifth  digit infection.    TECHNIQUE: Multiplanar, multisequence MR imaging of the left forefoot  and midfoot was obtained using standard sequences in 3 orthogonal  planes without the use of intravenous or intra-articular gadolinium  contrast.    FINDINGS:    As seen on the plain radiographs, there is extensive air within the  subcutaneous soft tissues along the dorsal aspect of the left foot,  dorsal to the third through fifth distal metatarsals, greatest along  the dorsal aspect of the distal fifth metatarsal, at the fifth  metatarsophalangeal joint. Subtle bone marrow edema in the distal  fifth metatarsal as well as in the proximal to mid aspect of the fifth  toe proximal phalanx, with no significant low T1 signal. No  significant fluid at the left fifth metatarsophalangeal joint.     Scattered degenerative changes with subchondral edema including at the  distal first metatarsophalangeal joint as well as at the navicular  cuneiform joint.    The Lisfranc ligament is intact and the Lisfranc joint is intact.    Minimal fluid in the third intermetatarsal bursa.    Mild fatty infiltration within the muscles about the foot with  extensive soft tissue edema. No discrete abscess although contrast was  not administered, mildly limiting evaluation.    The tendinous structures are grossly intact. No intermetatarsal  masses.      Impression    IMPRESSION:  1. Extensive presumed necrotizing infection within the soft tissues of  the left forefoot/midfoot with soft tissue defect seen along the  dorsal lateral aspect of the foot. There is extensive gas formation,  as seen on the radiographs, extending along the dorsal aspect of the  third through fifth metatarsals, greatest at  the fifth  metatarsophalangeal joint. No significant joint effusion at the fifth  metatarsophalangeal joint to suggest septic joint. There is reactive  bone marrow edema in the distal fifth metatarsal, and in the fifth toe  proximal phalanx without definite corresponding low T1 signal,  findings presumed to represent osteitis however early osteomyelitis  cannot be excluded. Correlate with clinical exam.    2. Diffuse soft tissue edema within the subcutaneous tissues and  within the muscles of the left forefoot/midfoot, most likely  representing a cellulitis/myositis. No discrete abscess although  contrast was not administered.    3. Degenerative changes at the navicular cuneiform joint as well as  first metatarsophalangeal joint.    [Consider Follow Up: Presumed necrotizing infection, in the left foot,  as above]    This report will be copied to the [facility] to ensure a  provider acknowledges the finding. [facility] Center is available Monday  through Friday 8am-3:30 pm.       BRIEN MICHAESL MD         SYSTEM ID:  [id number]     Procedures: 4/8/25 per podiatry  PROCEDURE:     1. Left foot fifth ray resection   2. Left foot excisional debridement down to and including tendon for site measuring 6 cm x 4 cm x 2 cm     Per podiatry 4/11:    1.  Left foot fifth metatarsal resection   2.  Left foot excisional debridement down to and including tendon, site measuring 7 cm x 4 cm x 2 cm     I have personally have reviewed the patient's most up to labs, orders, and medications myself

## 2025-04-14 NOTE — PLAN OF CARE
"To Do:  End of Shift Summary  For vital signs and complete assessments, please see documentation flowsheets.     Pertinent assessments: A&Ox4, up sba with walker for stability. Pt denies nausea, dizziness, sob. L extremity elevated at all times, denies numbness and tingling in feet. PIV SL. On mod carb diet. ACHS blood sugars, 225 and 227. Up standby assist with walker to bathroom.     Major Shift Events: Uneventful     Treatment Plan: Podiatry following, wound vac placement at discharge. Cont diabetic education.     Bedside Nurse: Maci Seymour RN                                         Goal Outcome Evaluation:         Problem: Pain Acute  Goal: Optimal Pain Control and Function  Outcome: Progressing  Intervention: Prevent or Manage Pain  Recent Flowsheet Documentation  Taken 4/13/2025 1610 by Maci Seymour, RN  Medication Review/Management: medications reviewed     Problem: Adult Inpatient Plan of Care  Goal: Plan of Care Review  Description: The Plan of Care Review/Shift note should be completed every shift.  The Outcome Evaluation is a brief statement about your assessment that the patient is improving, declining, or no change.  This information will be displayed automatically on your shiftnote.  Outcome: Progressing  Goal: Patient-Specific Goal (Individualized)  Description: You can add care plan individualizations to a care plan. Examples of Individualization might be:  \"Parent requests to be called daily at 9am for status\", \"I have a hard time hearing out of my right ear\", or \"Do not touch me to wake me up as it startlesme\".  Outcome: Progressing  Goal: Absence of Hospital-Acquired Illness or Injury  Outcome: Progressing  Intervention: Identify and Manage Fall Risk  Recent Flowsheet Documentation  Taken 4/13/2025 1610 by Maci Seymour, RN  Safety Promotion/Fall Prevention:   nonskid shoes/slippers when out of bed   increased rounding and observation   increase visualization of patient   " activity supervised  Intervention: Prevent Infection  Recent Flowsheet Documentation  Taken 4/13/2025 1610 by Maci Seymour RN  Infection Prevention: rest/sleep promoted  Goal: Optimal Comfort and Wellbeing  Outcome: Progressing  Intervention: Provide Person-Centered Care  Recent Flowsheet Documentation  Taken 4/13/2025 1610 by Maci Seymour RN  Trust Relationship/Rapport:   care explained   choices provided  Goal: Readiness for Transition of Care  Outcome: Progressing     Problem: Diabetes  Goal: Optimal Coping  Outcome: Progressing  Goal: Optimal Functional Ability  Outcome: Progressing  Intervention: Optimize Functional Ability  Recent Flowsheet Documentation  Taken 4/13/2025 1610 by Maci Seymour RN  Assistive Device Utilized: walker  Activity Management:   activity adjusted per tolerance   ambulated to bathroom   back to bed  Activity Assistance Provided: assistance, stand-by  Goal: Blood Glucose Level Within Target Range  Outcome: Progressing  Goal: Minimize Risk of Hypoglycemia  Outcome: Progressing     Problem: Wound  Goal: Optimal Coping  Outcome: Progressing  Goal: Optimal Functional Ability  Outcome: Progressing  Intervention: Optimize Functional Ability  Recent Flowsheet Documentation  Taken 4/13/2025 1610 by Maci Seymour RN  Assistive Device Utilized: walker  Activity Management:   activity adjusted per tolerance   ambulated to bathroom   back to bed  Activity Assistance Provided: assistance, stand-by  Goal: Absence of Infection Signs and Symptoms  Outcome: Progressing  Goal: Improved Oral Intake  Outcome: Progressing  Goal: Optimal Pain Control and Function  Outcome: Progressing  Goal: Skin Health and Integrity  Outcome: Progressing  Intervention: Optimize Skin Protection  Recent Flowsheet Documentation  Taken 4/13/2025 1610 by Maci Seymour RN  Activity Management:   activity adjusted per tolerance   ambulated to bathroom   back to bed  Goal: Optimal Wound  Healing  Outcome: Progressing

## 2025-04-14 NOTE — PLAN OF CARE
"Patient denies pain. Wound dressing CDI. CMS intact. Ambulates to BR, SBA w/ RW. IV antibiotics continued. Plan for wound vac placement today. Pathology results pending.     Goal Outcome Evaluation:      Plan of Care Reviewed With: patient    Overall Patient Progress: improving    Outcome Evaluation: Podiatry following. Diabetes education. IV antibiotics.    Problem: Pain Acute  Goal: Optimal Pain Control and Function  4/14/2025 0438 by Lizbet Segura RN  Outcome: Progressing  Intervention: Prevent or Manage Pain  Recent Flowsheet Documentation  Taken 4/14/2025 0030 by Lizbet Segura RN  Medication Review/Management: medications reviewed     Problem: Adult Inpatient Plan of Care  Goal: Plan of Care Review  Description: The Plan of Care Review/Shift note should be completed every shift.  The Outcome Evaluation is a brief statement about your assessment that the patient is improving, declining, or no change.  This information will be displayed automatically on your shiftnote.  4/14/2025 0438 by Lizbet Segura RN  Outcome: Progressing  Flowsheets (Taken 4/14/2025 0438)  Plan of Care Reviewed With: patient  Overall Patient Progress: improving  4/14/2025 0437 by Lizbet Segura RN  Goal: Patient-Specific Goal (Individualized)  Description: You can add care plan individualizations to a care plan. Examples of Individualization might be:  \"Parent requests to be called daily at 9am for status\", \"I have a hard time hearing out of my right ear\", or \"Do not touch me to wake me up as it startlesme\".  4/14/2025 0438 by Lizbet Segura RN  Outcome: Progressing  Goal: Absence of Hospital-Acquired Illness or Injury  4/14/2025 0438 by Lizbet Segura RN  Outcome: Progressing  Intervention: Identify and Manage Fall Risk  Recent Flowsheet Documentation  Taken 4/14/2025 0030 by Lizbet Segura RN  Safety Promotion/Fall Prevention:   nonskid shoes/slippers when out of bed   increased rounding and observation   increase " visualization of patient   activity supervised  Intervention: Prevent Skin Injury  Recent Flowsheet Documentation  Taken 4/14/2025 0030 by Lizbet Segura RN  Body Position: position changed independently  Intervention: Prevent Infection  Recent Flowsheet Documentation  Taken 4/14/2025 0030 by Lizbet Segura RN  Infection Prevention:   rest/sleep promoted   single patient room provided  Goal: Optimal Comfort and Wellbeing  4/14/2025 0438 by Lizbet Segura RN  Outcome: Progressing  4/14/2025 0437 by Lizbet Segura RN  Outcome: Progressing  Intervention: Provide Person-Centered Care  Recent Flowsheet Documentation  Taken 4/14/2025 0030 by Lizbet Segura RN  Trust Relationship/Rapport:   care explained   choices provided  Goal: Readiness for Transition of Care  4/14/2025 0438 by Lizbet Segura RN  Outcome: Progressing  Problem: Diabetes  Goal: Optimal Coping  4/14/2025 0438 by Lizbet Segura RN  Outcome: Progressing  Goal: Optimal Functional Ability  4/14/2025 0438 by Lizbet Segura RN  Outcome: Progressing  Intervention: Optimize Functional Ability  Recent Flowsheet Documentation  Taken 4/14/2025 0030 by Lizbet Segura RN  Assistive Device Utilized: walker  Activity Management: activity adjusted per tolerance  Activity Assistance Provided: assistance, stand-by  Goal: Blood Glucose Level Within Target Range  4/14/2025 0438 by Lizbet Segura RN  Outcome: Progressing  Goal: Minimize Risk of Hypoglycemia  4/14/2025 0438 by Lizbet Segura RN  Outcome: Progressing   Problem: Wound  Goal: Optimal Coping  4/14/2025 0438 by Lizbet Segura RN  Outcome: Progressing  Goal: Optimal Functional Ability  4/14/2025 0438 by Lizbet Segura RN  Outcome: Progressing  Intervention: Optimize Functional Ability  Recent Flowsheet Documentation  Taken 4/14/2025 0030 by Lizbet Segura RN  Assistive Device Utilized: walker  Activity Management: activity adjusted per tolerance  Activity Assistance Provided: assistance,  stand-by  Goal: Absence of Infection Signs and Symptoms  4/14/2025 0438 by Lizbet Segura RN  Outcome: Progressing  Goal: Improved Oral Intake  4/14/2025 0438 by Lizbet Segura RN  Outcome: Progressing  Goal: Optimal Pain Control and Function  4/14/2025 0438 by Lizbet Segura RN  Outcome: Progressing  Goal: Skin Health and Integrity  4/14/2025 0438 by Lizbet Segura RN  Outcome: Progressing  Intervention: Optimize Skin Protection  Recent Flowsheet Documentation  Taken 4/14/2025 0030 by Lizbet Segura, RN  Activity Management: activity adjusted per tolerance  Head of Bed (HOB) Positioning: HOB at 20-30 degrees  Goal: Optimal Wound Healing  4/14/2025 0438 by Lizbet Segura RN  Outcome: Progressing

## 2025-04-14 NOTE — CONSULTS
"CLINICAL NUTRITION SERVICES - ASSESSMENT NOTE    Registered Dietitian Interventions:  - Continue diet as ordered.  Encouraged consistent protein intake.       REASON FOR ASSESSMENT  Length of stay.    PMH: DMII.    Admit 2/2: L necrotic foot/infection requiring L foot 5th ray resection + excisional debridement on 4/08, wound VAC planned for today.      INFORMATION OBTAINED  Assessed patient in room.    NUTRITION HISTORY  - Diet at home: Monitoring/aware of CHO.  Wants to know more about CHO controlled diet and monitoring BG.  - Usual intakes: Meals BID is normal at home/baseline for years.  - Doesn't usually have breakfast, eats lunch and dinner.  - Barriers to PO intakes: None PTA.  - Use of oral supplements: None.  - Allergies: NKFA.    CURRENT NUTRITION ORDERS  Diet: Moderate Consistent Carbohydrate  Snacks/Supplements: None    CURRENT INTAKE/TOLERANCE  - 100% intakes per flowsheets.    - Ordering meals BID.      LABS: Reviewed  Lab Results   Component Value Date    A1C 12.3 04/07/2025    A1C 11.7 04/01/2021    A1C 6.6 11/12/2013     MEDICATIONS: Reviewed   - Including insulin regimen     SYSTEM AND PHYSICAL FINDINGS:  - Daily BMs recorded.  - No documentation of PI.    CONSULTS:  - Podiatry following  - WOCN consulted for VAC placement    ANTHROPOMETRICS  Height: 190.5 cm (6' 3\")  Admission Weight: 117.3 kg (258 lb 9.6 oz) (04/07/25 0837)   Most Recent Weight: 117.2 kg (258 lb 4.8 oz) (04/07/25 1456)  BMI: Body mass index is 32.29 kg/m .   Weight History:   Wt Readings from Last 10 Encounters:   04/07/25 117.2 kg (258 lb 4.8 oz)   05/10/21 128.8 kg (284 lb)   04/08/21 129.3 kg (285 lb)   03/13/14 135.6 kg (299 lb)   01/10/14 (!) 138.1 kg (304 lb 8 oz)   12/13/13 (!) 139.7 kg (308 lb)   11/26/13 (!) 141.1 kg (311 lb)   11/12/13 (!) 146.5 kg (323 lb)   08/27/13 131.5 kg (290 lb)   12/12/12 (!) 142 kg (313 lb)     - No edema documented.   - States he intentionally lost weight via lifestyle and diet changes PTA.  " "Had been watching CHO to a degree/cutting back PTA causing wt loss per description.    ASSESSED NUTRITION NEEDS  Dosing Weight: 117 kg, based on  standing scale wt  Estimated Energy Needs: >/=2340 kcals/day (>/=20 kcals/kg)  Justification: Minimum maintenance, post-op wound healing consideration  Estimated Protein Needs: >/=117 grams protein/day (>/=1 grams of pro/kg)  Justification: Preservation of lean body mass, post-op wound healing  Estimated Fluid Needs: 1 mL/kcal or per provider    MALNUTRITION  % Intake: No decreased intake noted  % Weight Loss: Weight loss does not meet criteria   Subcutaneous Fat Loss: None observed  Muscle Loss: None observed  Fluid Accumulation/Edema: None noted  Malnutrition Diagnosis: Patient does not meet two of the established criteria necessary for diagnosing malnutrition  Malnutrition Present on Admission: No    NUTRITION DIAGNOSIS  Increased nutrient needs (protein)  related to post-op wound healing as evidenced by assessed needs as outlined.    INTERVENTIONS  Nutrition education content: Provided with handout on foods which contain CHO per Justino's request, discussed recs for g CHO at meals/snacks, encouraged consistent protein and fiber.  Justino asked appropriate questions and notes he is going to consistently track CHO intakes and monitor BG at home.  Collaboration by nutrition professional with other providers: Discussed POC w/ team during rounds.  Checked-in w/ RN on unit.     GOALS  Patient to consume % of nutritionally adequate meal trays TID, or the equivalent with supplements/snacks.     MONITORING/EVALUATION  Progress toward goals will be monitored and evaluated per policy.      Jaci Lambert RDN, , LD  Clinical Dietitian  [facility] Message Group: \"Dietitian [Paula]\"  Office: [phone]  Pagers:  3rd floor/ICU: [phone]  All other floors: [phone]  Weekend/holiday: [phone]    "

## 2025-04-14 NOTE — PLAN OF CARE
Pertinent assessments: A&O, up sba with walker. Denies pain sob or nausea. Received shower today. WOC and Podiatry following. Wound Vac applied and working well. Cont DM education. IV ABX.  and 158.    Major Shift Events: Education and demonstration given regarding how to check blood sugar, and self injection of insulin.    Treatment Plan: Cont DM education. Monitor L foot and wound vac output.     Bedside Nurse: Katiana Stein RN       Problem: Adult Inpatient Plan of Care  Goal: Plan of Care Review  Description: The Plan of Care Review/Shift note should be completed every shift.  The Outcome Evaluation is a brief statement about your assessment that the patient is improving, declining, or no change.  This information will be displayed automatically on your shiftnote.  Recent Flowsheet Documentation  Taken 4/14/2025 1515 by Katiana Stein RN  Plan of Care Reviewed With: patient  Overall Patient Progress: improving  Goal: Absence of Hospital-Acquired Illness or Injury  Intervention: Prevent and Manage VTE (Venous Thromboembolism) Risk  Recent Flowsheet Documentation  Taken 4/14/2025 1100 by Katiana Stein RN  VTE Prevention/Management: SCDs off (sequential compression devices)  Intervention: Prevent Infection  Recent Flowsheet Documentation  Taken 4/14/2025 1100 by Katiana Stein RN  Infection Prevention:   rest/sleep promoted   single patient room provided     Problem: Adult Inpatient Plan of Care  Goal: Plan of Care Review  Description: The Plan of Care Review/Shift note should be completed every shift.  The Outcome Evaluation is a brief statement about your assessment that the patient is improving, declining, or no change.  This information will be displayed automatically on your shiftnote.  Flowsheets (Taken 4/14/2025 1515)  Plan of Care Reviewed With: patient  Overall Patient Progress: improving  Goal: Absence of Hospital-Acquired Illness or Injury  Intervention: Prevent and Manage VTE  (Venous Thromboembolism) Risk  Recent Flowsheet Documentation  Taken 4/14/2025 1100 by Katiana Stein, RN  VTE Prevention/Management: SCDs off (sequential compression devices)  Intervention: Prevent Infection  Recent Flowsheet Documentation  Taken 4/14/2025 1100 by Katiana Stein, RN  Infection Prevention:   rest/sleep promoted   single patient room provided   Goal Outcome Evaluation:      Plan of Care Reviewed With: patient    Overall Patient Progress: improvingOverall Patient Progress: improving    Outcome Evaluation: Treatment Plan: Podiatry following, wound vac placement at discharge. Cont diabetic education.

## 2025-04-15 LAB
BACTERIA TISS BX CULT: ABNORMAL
GLUCOSE BLDC GLUCOMTR-MCNC: 106 MG/DL (ref 70–99)
GLUCOSE BLDC GLUCOMTR-MCNC: 125 MG/DL (ref 70–99)
GLUCOSE BLDC GLUCOMTR-MCNC: 149 MG/DL (ref 70–99)
GLUCOSE BLDC GLUCOMTR-MCNC: 152 MG/DL (ref 70–99)
GLUCOSE BLDC GLUCOMTR-MCNC: 196 MG/DL (ref 70–99)
HOLD SPECIMEN: NORMAL
MAGNESIUM SERPL-MCNC: 1.8 MG/DL (ref 1.7–2.3)
PHOSPHATE SERPL-MCNC: 4.5 MG/DL (ref 2.5–4.5)
POTASSIUM SERPL-SCNC: 3.4 MMOL/L (ref 3.4–5.3)

## 2025-04-15 PROCEDURE — 250N000011 HC RX IP 250 OP 636: Mod: JZ | Performed by: PHYSICIAN ASSISTANT

## 2025-04-15 PROCEDURE — 83735 ASSAY OF MAGNESIUM: CPT | Performed by: INTERNAL MEDICINE

## 2025-04-15 PROCEDURE — 84100 ASSAY OF PHOSPHORUS: CPT | Performed by: INTERNAL MEDICINE

## 2025-04-15 PROCEDURE — 120N000001 HC R&B MED SURG/OB

## 2025-04-15 PROCEDURE — 250N000013 HC RX MED GY IP 250 OP 250 PS 637: Performed by: INTERNAL MEDICINE

## 2025-04-15 PROCEDURE — 99233 SBSQ HOSP IP/OBS HIGH 50: CPT | Performed by: HOSPITALIST

## 2025-04-15 PROCEDURE — 84132 ASSAY OF SERUM POTASSIUM: CPT | Performed by: INTERNAL MEDICINE

## 2025-04-15 PROCEDURE — 36415 COLL VENOUS BLD VENIPUNCTURE: CPT | Performed by: INTERNAL MEDICINE

## 2025-04-15 RX ADMIN — ATORVASTATIN CALCIUM 40 MG: 40 TABLET, FILM COATED ORAL at 20:16

## 2025-04-15 RX ADMIN — LISINOPRIL 20 MG: 20 TABLET ORAL at 08:48

## 2025-04-15 RX ADMIN — CLINDAMYCIN PHOSPHATE 900 MG: 900 INJECTION, SOLUTION INTRAVENOUS at 20:29

## 2025-04-15 RX ADMIN — CLINDAMYCIN PHOSPHATE 900 MG: 900 INJECTION, SOLUTION INTRAVENOUS at 04:05

## 2025-04-15 RX ADMIN — CLINDAMYCIN PHOSPHATE 900 MG: 900 INJECTION, SOLUTION INTRAVENOUS at 12:21

## 2025-04-15 RX ADMIN — HYDROCHLOROTHIAZIDE 25 MG: 25 TABLET ORAL at 08:48

## 2025-04-15 ASSESSMENT — ACTIVITIES OF DAILY LIVING (ADL)
ADLS_ACUITY_SCORE: 44
ADLS_ACUITY_SCORE: 41
ADLS_ACUITY_SCORE: 41
ADLS_ACUITY_SCORE: 44
ADLS_ACUITY_SCORE: 41
ADLS_ACUITY_SCORE: 44
ADLS_ACUITY_SCORE: 41
ADLS_ACUITY_SCORE: 44
ADLS_ACUITY_SCORE: 41
ADLS_ACUITY_SCORE: 44
ADLS_ACUITY_SCORE: 41
ADLS_ACUITY_SCORE: 44
ADLS_ACUITY_SCORE: 44
ADLS_ACUITY_SCORE: 41
ADLS_ACUITY_SCORE: 41

## 2025-04-15 NOTE — PLAN OF CARE
"End of Shift Summary  For vital signs and complete assessments, please see documentation flowsheets.     Pertinent assessments:  Pt A&Ox4. Up Ax1. Denies pain. Wound vac CDI. Boot on when OOB. Continued education regarding glucose monitoring and insulin administration. Awaiting home glucose monitoring supplied from pharmacy.    Major Shift Events: Uneventful     Treatment Plan: Monitor L foot and wound vac output. Diabetes education.      Problem: Pain Acute  Goal: Optimal Pain Control and Function  Outcome: Progressing  Intervention: Prevent or Manage Pain  Recent Flowsheet Documentation  Taken 4/15/2025 0848 by Jahaira Ellison RN  Medication Review/Management: medications reviewed     Problem: Adult Inpatient Plan of Care  Goal: Plan of Care Review  Description: The Plan of Care Review/Shift note should be completed every shift.  The Outcome Evaluation is a brief statement about your assessment that the patient is improving, declining, or no change.  This information will be displayed automatically on your shiftnote.  Outcome: Progressing  Flowsheets (Taken 4/15/2025 1832)  Outcome Evaluation: receptive to diabetes edu  Plan of Care Reviewed With: patient  Overall Patient Progress: improving  Goal: Patient-Specific Goal (Individualized)  Description: You can add care plan individualizations to a care plan. Examples of Individualization might be:  \"Parent requests to be called daily at 9am for status\", \"I have a hard time hearing out of my right ear\", or \"Do not touch me to wake me up as it startlesme\".  Outcome: Progressing  Goal: Absence of Hospital-Acquired Illness or Injury  Outcome: Progressing  Intervention: Identify and Manage Fall Risk  Recent Flowsheet Documentation  Taken 4/15/2025 0848 by Jahaira Ellison RN  Safety Promotion/Fall Prevention:   nonskid shoes/slippers when out of bed   increased rounding and observation   increase visualization of patient   activity supervised  Intervention: Prevent " Infection  Recent Flowsheet Documentation  Taken 4/15/2025 0848 by Jahaira Ellison RN  Infection Prevention:   rest/sleep promoted   single patient room provided  Goal: Optimal Comfort and Wellbeing  Outcome: Progressing  Goal: Readiness for Transition of Care  Outcome: Progressing     Problem: Diabetes  Goal: Optimal Coping  Outcome: Progressing  Goal: Optimal Functional Ability  Outcome: Progressing  Goal: Blood Glucose Level Within Target Range  Outcome: Progressing  Goal: Minimize Risk of Hypoglycemia  Outcome: Progressing     Problem: Wound  Goal: Optimal Coping  Outcome: Progressing  Goal: Optimal Functional Ability  Outcome: Progressing  Goal: Absence of Infection Signs and Symptoms  Outcome: Progressing  Goal: Improved Oral Intake  Outcome: Progressing  Goal: Optimal Pain Control and Function  Outcome: Progressing  Goal: Skin Health and Integrity  Outcome: Progressing  Goal: Optimal Wound Healing  Outcome: Progressing   Goal Outcome Evaluation:      Plan of Care Reviewed With: patient    Overall Patient Progress: improvingOverall Patient Progress: improving    Outcome Evaluation: receptive to diabetes edu

## 2025-04-15 NOTE — PROGRESS NOTES
[facility]    Medicine Progress Note - Hospitalist Service    Date of Admission:  4/7/2025    Assessment & Plan   Justino Giordano is a 65 year old male with a history of HTN, DM Type 2, HLD, Obesity who presents to the ED today with a left foot injury.        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  He has not been taking medications nor caring for his diabetes. .  He was afebrile, hypertensive, HR 90, RR 20.  WBC 16.4,  with negative lactic acid.  Left Foot Xray revealed extensive soft tissue gas within the dorsal forefoot along the second through fifth metacarpal heads/necks and surrounding the fifth MTP joint, highly concerning for necrotizing soft tissue infection. There is some localized osteopenia at the medial base of the fifth proximal phalanx which may be secondary to superimposition of the soft tissue gas. No discrete osseous destructive change. Minimal scattered degenerative arthritis.  He was placed on IV fluids and made NPO.  He was empirically placed on Vancomycin, Zosyn, Clindamycin. Wound Culture with gram stain with a  polymicrobial infection with culture showing laura and fusobacterium on admission and from surgery.  Will discontinue zosyn/vanco and continue on IV clindamycin, transition to oral when ok with podiatry, awaiting pathology to see if he requires further srugery/debridement.     Podiatry consulted and patient taken on 4/8/25 for left 5th ray resection with debridement.    Only to be weight bearing to heal on the left lower extremity, Cam boot per podiatry.  Patient works as a  for [employer]. Patient had more debridement per podiatry on 4/11, plan to go home with a wound vac, social work working on.  Wound care per podiatry .  Pain well controlled, likely with severe neuropathy. Physical therapy to see to make sure he will be safe for home. Recommend outpatient physical therapy.  Patient had a normal ARASELI US of the lower extremity.      Noncompliance  Patient has not been taking any medications nor caring for his diabetes, hypertension.  This puts him at risk for readmissions, morbidity, and early mortality. We discussed the importance of taking care of his medical issues at length. Need to make medications and care as easy as possible for him so that he will be compliant.       Untreated and Uncontrolled type 2 diabetes, will be insulin dependent with peripheral neuropathy  Hgb A1C 11.7 (4/2021) and was 6.6 in (2013).  BS on admission 282 with hemoglobin A1c up to 12.3.  Bicarb 20, AG 19.  Not technically DKA by criteria.  Patient denies he has a history of diabetes, although reports he has had high blood sugars in the past.  No longer takes Metformin and never started Lantus as recommended in 2021. Given the elevated A1c above 10 will need insulin.  Was started on lantus and an insulin correction scale.  Needs close primary care provider follow up with diabetic education.   Blood sugar 200s, continue on lantus and increase his prandial dosing.  I doubt with his compliance issues that he can do carb counting.     Needs ongoing insulin teaching per nursing     Hypertension, hyperlipidemia  -Previously on Atorvastatin, no longer takes this.  BP elevated 150-170s on arrival. , HDL 41, .  Will start on lipitor.   BP currently  138-150s/80s  - monitor bp trend,  - Ace inhibitor added 4/9. Added hydrochlorothiazide. 4/13, will need to  follow his BMP and BP in a few weeks with a primary care provider which he needs to establish     Hyponatremia, Hypochloremia  Sodium when corrected for glucose is 131, chloride 88.  - IVF hydration now off of IV fluids   -repeat BMP in am showing sodium stable 131. Creatinine normal         Diet:  "Moderate Consistent Carb (60 g CHO per Meal) Diet    DVT Prophylaxis: Pneumatic Compression Devices  Nuñez Catheter: Not present  Lines: None     Cardiac Monitoring: None  Code Status: Full Code      Clinically Significant Risk Factors         # Hyponatremia: Lowest Na = 131 mmol/L in last 2 days, will monitor as appropriate  # Hypochloremia: Lowest Cl = 97 mmol/L in last 2 days, will monitor as appropriate          # Hypertension: Noted on problem list           # DMII: A1C = 12.3 % (Ref range: <5.7 %) within past 6 months   # Obesity: Estimated body mass index is 32.29 kg/m  as calculated from the following:    Height as of this encounter: 1.905 m (6' 3\").    Weight as of this encounter: 117.2 kg (258 lb 4.8 oz).      # Financial/Environmental Concerns: other (see comments) (pt expressed concerns about being off of work and without pay)         Social Drivers of Health            Disposition Plan     Medically Ready for Discharge: Anticipated Tomorrow             Marcial Fried MD  Hospitalist Service  [facility]  Securely message with [employer] (more info)  Text page via [employer] Paging/Directory   ______________________________________________________________________    Interval History   Asymptomatic, feels better     Physical Exam   Vital Signs: Temp: 98.1  F (36.7  C) Temp src: Oral BP: (!) 163/89 Pulse: 71   Resp: 16 SpO2: 99 % O2 Device: None (Room air)    Weight: 258 lbs 4.8 oz    Constitutional: awake, alert, cooperative, no apparent distress, and appears stated age  Respiratory: No increased work of breathing, good air exchange, clear to auscultation bilaterally, no crackles or wheezing  Cardiovascular: Normal apical impulse, regular rate and rhythm, normal S1 and S2, no S3 or S4, and no murmur noted    Medical Decision Making       38 MINUTES SPENT BY ME on the date of service doing chart review, history, exam, documentation & further activities per the note.      Data     I have " personally reviewed the following data over the past 24 hrs:    N/A  \   N/A   / N/A     N/A N/A N/A /  152 (H)   3.4 N/A N/A \       Imaging results reviewed over the past 24 hrs:   No results found for this or any previous visit (from the past 24 hours).

## 2025-04-15 NOTE — PLAN OF CARE
"To Do:  End of Shift Summary  For vital signs and complete assessments, please see documentation flowsheets.     Pertinent assessments: A&Ox4, VSS on Ra. Denies pain, nausea, dizziness. PIV SL. Up standby assist with wound vac to bathroom. ACHS blood sugar, 115 & 126. Mod carb diet. Provided insulin education & encouraged self-insulin injection. Calls appropriately.     Major Shift Events: Uneventful     Treatment Plan: Monitor L foot and wound vac output.     Bedside Nurse: Maci Seymour RN                                               Goal Outcome Evaluation:         Problem: Pain Acute  Goal: Optimal Pain Control and Function  Outcome: Progressing  Intervention: Prevent or Manage Pain  Recent Flowsheet Documentation  Taken 4/14/2025 1550 by Maci Seymour, RN  Medication Review/Management: medications reviewed     Problem: Adult Inpatient Plan of Care  Goal: Plan of Care Review  Description: The Plan of Care Review/Shift note should be completed every shift.  The Outcome Evaluation is a brief statement about your assessment that the patient is improving, declining, or no change.  This information will be displayed automatically on your shiftnote.  Outcome: Progressing  Goal: Patient-Specific Goal (Individualized)  Description: You can add care plan individualizations to a care plan. Examples of Individualization might be:  \"Parent requests to be called daily at 9am for status\", \"I have a hard time hearing out of my right ear\", or \"Do not touch me to wake me up as it startlesme\".  Outcome: Progressing  Goal: Absence of Hospital-Acquired Illness or Injury  Outcome: Progressing  Intervention: Identify and Manage Fall Risk  Recent Flowsheet Documentation  Taken 4/14/2025 1550 by Maci Seymour, RN  Safety Promotion/Fall Prevention:   nonskid shoes/slippers when out of bed   increased rounding and observation   increase visualization of patient   activity supervised  Intervention: Prevent Skin " Injury  Recent Flowsheet Documentation  Taken 4/14/2025 1550 by Maci Seymour RN  Body Position: position changed independently  Intervention: Prevent Infection  Recent Flowsheet Documentation  Taken 4/14/2025 1550 by Maci Seymour RN  Infection Prevention:   rest/sleep promoted   single patient room provided  Goal: Optimal Comfort and Wellbeing  Outcome: Progressing  Intervention: Provide Person-Centered Care  Recent Flowsheet Documentation  Taken 4/14/2025 1550 by Maci Seymour RN  Trust Relationship/Rapport:   care explained   choices provided  Goal: Readiness for Transition of Care  Outcome: Progressing     Problem: Diabetes  Goal: Optimal Coping  Outcome: Progressing  Goal: Optimal Functional Ability  Outcome: Progressing  Intervention: Optimize Functional Ability  Recent Flowsheet Documentation  Taken 4/14/2025 1550 by Maci Seymour RN  Assistive Device Utilized: walker  Activity Management: ambulated to bathroom  Activity Assistance Provided: assistance, stand-by  Goal: Blood Glucose Level Within Target Range  Outcome: Progressing  Goal: Minimize Risk of Hypoglycemia  Outcome: Progressing     Problem: Wound  Goal: Optimal Coping  Outcome: Progressing  Goal: Optimal Functional Ability  Outcome: Progressing  Intervention: Optimize Functional Ability  Recent Flowsheet Documentation  Taken 4/14/2025 1550 by Maci Seymour RN  Assistive Device Utilized: walker  Activity Management: ambulated to bathroom  Activity Assistance Provided: assistance, stand-by  Goal: Absence of Infection Signs and Symptoms  Outcome: Progressing  Goal: Improved Oral Intake  Outcome: Progressing  Goal: Optimal Pain Control and Function  Outcome: Progressing  Goal: Skin Health and Integrity  Outcome: Progressing  Intervention: Optimize Skin Protection  Recent Flowsheet Documentation  Taken 4/14/2025 1550 by Maci Seymour RN  Activity Management: ambulated to bathroom  Head of Bed (HOB)  Positioning: HOB at 20-30 degrees  Goal: Optimal Wound Healing  Outcome: Progressing

## 2025-04-15 NOTE — CONSULTS
SPIRITUAL HEALTH SERVICES - Consult Note   RH MS 5     Referral Source/Reason for Visit: [facility] Consult      Summary and Recommendations -   Pt Justino voiced concerns about his ulcerated foot.  Pt Justino named his friends, his sister, and his nephew as a part of his support network.  Justino is Pentecostalism but is not affiliated with a Islam community.     Plan: Informed pt how he can request further  support.  This author and other chaplains remain available per pt/family request.     RICHARD Gomez   Intern    [facility] available 24/7 for emergent requests/referrals, either by paging the on-call  or by entering an ASAP/STAT consult in [facility], which will also page the on-call .       Assessment     Saw pt Justino LAI Pasquale per [facility] consult to assess pt's emotional/spiritual resources and needs per his length of stay..     Patient/Family Understanding of Illness and Goals of Care - Justino said that he came to the hospital after cutting his foot which resulted in an infection.     Distress and Loss - Justino did not name any loss at this time.     Strengths, Coping, and Resources -   Justino named his friends, his sister, and his nephew as a part of his support network.  Justino shared that he likes to reynolds and go to the [employer].  Justino shared memories of his marathon running days.  Justino loves golf, even though he has not had the opportunity to play in 5 years.     Meaning, Beliefs, and Spirituality -   Justino is Pentecostalism but is not affiliated with a Islam community.  Justino welcomed my offer for prayer, which was given.

## 2025-04-15 NOTE — PLAN OF CARE
Pertinent assessments: A&Ox4, Up Ax1. VSS. RA. Denies pain. Wound vac CDI. BG stable.    Major Shift Events: Uneventful     Treatment Plan: Monitor L foot and wound vac output.     Bedside Nurse: Martha Martinez RN

## 2025-04-16 LAB
CRP SERPL-MCNC: 63.85 MG/L
ERYTHROCYTE [DISTWIDTH] IN BLOOD BY AUTOMATED COUNT: 11.6 % (ref 10–15)
GLUCOSE BLDC GLUCOMTR-MCNC: 105 MG/DL (ref 70–99)
GLUCOSE BLDC GLUCOMTR-MCNC: 141 MG/DL (ref 70–99)
GLUCOSE BLDC GLUCOMTR-MCNC: 150 MG/DL (ref 70–99)
GLUCOSE BLDC GLUCOMTR-MCNC: 161 MG/DL (ref 70–99)
GLUCOSE BLDC GLUCOMTR-MCNC: 163 MG/DL (ref 70–99)
HCT VFR BLD AUTO: 37.8 % (ref 40–53)
HGB BLD-MCNC: 12.9 G/DL (ref 13.3–17.7)
MAGNESIUM SERPL-MCNC: 1.8 MG/DL (ref 1.7–2.3)
MCH RBC QN AUTO: 29.3 PG (ref 26.5–33)
MCHC RBC AUTO-ENTMCNC: 34.1 G/DL (ref 31.5–36.5)
MCV RBC AUTO: 86 FL (ref 78–100)
PATH REPORT.COMMENTS IMP SPEC: NORMAL
PATH REPORT.COMMENTS IMP SPEC: NORMAL
PATH REPORT.FINAL DX SPEC: NORMAL
PATH REPORT.GROSS SPEC: NORMAL
PATH REPORT.MICROSCOPIC SPEC OTHER STN: NORMAL
PATH REPORT.RELEVANT HX SPEC: NORMAL
PHOSPHATE SERPL-MCNC: 3.8 MG/DL (ref 2.5–4.5)
PHOTO IMAGE: NORMAL
PLATELET # BLD AUTO: 519 10E3/UL (ref 150–450)
POTASSIUM SERPL-SCNC: 3 MMOL/L (ref 3.4–5.3)
RBC # BLD AUTO: 4.4 10E6/UL (ref 4.4–5.9)
WBC # BLD AUTO: 10.6 10E3/UL (ref 4–11)

## 2025-04-16 PROCEDURE — 84100 ASSAY OF PHOSPHORUS: CPT | Performed by: HOSPITALIST

## 2025-04-16 PROCEDURE — 88305 TISSUE EXAM BY PATHOLOGIST: CPT | Mod: 26 | Performed by: PATHOLOGY

## 2025-04-16 PROCEDURE — 84132 ASSAY OF SERUM POTASSIUM: CPT | Performed by: HOSPITALIST

## 2025-04-16 PROCEDURE — 120N000001 HC R&B MED SURG/OB

## 2025-04-16 PROCEDURE — 250N000013 HC RX MED GY IP 250 OP 250 PS 637: Performed by: INTERNAL MEDICINE

## 2025-04-16 PROCEDURE — 36415 COLL VENOUS BLD VENIPUNCTURE: CPT | Performed by: PODIATRIST

## 2025-04-16 PROCEDURE — 86140 C-REACTIVE PROTEIN: CPT | Performed by: PODIATRIST

## 2025-04-16 PROCEDURE — 83735 ASSAY OF MAGNESIUM: CPT | Performed by: HOSPITALIST

## 2025-04-16 PROCEDURE — 97606 NEG PRS WND THER DME>50 SQCM: CPT

## 2025-04-16 PROCEDURE — 250N000013 HC RX MED GY IP 250 OP 250 PS 637: Performed by: HOSPITALIST

## 2025-04-16 PROCEDURE — 99233 SBSQ HOSP IP/OBS HIGH 50: CPT | Performed by: HOSPITALIST

## 2025-04-16 PROCEDURE — 250N000011 HC RX IP 250 OP 636: Mod: JZ | Performed by: PHYSICIAN ASSISTANT

## 2025-04-16 PROCEDURE — 85014 HEMATOCRIT: CPT | Performed by: PODIATRIST

## 2025-04-16 RX ORDER — POTASSIUM CHLORIDE 1500 MG/1
40 TABLET, EXTENDED RELEASE ORAL ONCE
Status: COMPLETED | OUTPATIENT
Start: 2025-04-16 | End: 2025-04-16

## 2025-04-16 RX ORDER — POTASSIUM CHLORIDE 1500 MG/1
20 TABLET, EXTENDED RELEASE ORAL ONCE
Status: COMPLETED | OUTPATIENT
Start: 2025-04-16 | End: 2025-04-16

## 2025-04-16 RX ADMIN — LISINOPRIL 20 MG: 20 TABLET ORAL at 08:37

## 2025-04-16 RX ADMIN — ATORVASTATIN CALCIUM 40 MG: 40 TABLET, FILM COATED ORAL at 20:01

## 2025-04-16 RX ADMIN — CLINDAMYCIN PHOSPHATE 900 MG: 900 INJECTION, SOLUTION INTRAVENOUS at 12:11

## 2025-04-16 RX ADMIN — POTASSIUM CHLORIDE 20 MEQ: 20 TABLET, EXTENDED RELEASE ORAL at 22:03

## 2025-04-16 RX ADMIN — CLINDAMYCIN PHOSPHATE 900 MG: 900 INJECTION, SOLUTION INTRAVENOUS at 04:45

## 2025-04-16 RX ADMIN — POTASSIUM CHLORIDE 40 MEQ: 20 TABLET, EXTENDED RELEASE ORAL at 20:01

## 2025-04-16 RX ADMIN — CLINDAMYCIN PHOSPHATE 900 MG: 900 INJECTION, SOLUTION INTRAVENOUS at 21:17

## 2025-04-16 RX ADMIN — HYDROCHLOROTHIAZIDE 25 MG: 25 TABLET ORAL at 08:37

## 2025-04-16 ASSESSMENT — ACTIVITIES OF DAILY LIVING (ADL)
ADLS_ACUITY_SCORE: 41

## 2025-04-16 NOTE — PROGRESS NOTES
"Podiatry / Foot and Ankle Surgery Progress Note    April 16, 2025    Subject: Patient was seen at bedside for his left foot. Has undergone two procedures for gas gangrene. States feels well. Pathology now available showing osteomyelitis.     Objective:  Vitals: /76 (BP Location: Right arm)   Pulse 72   Temp 98.1  F (36.7  C) (Oral)   Resp 19   Ht 1.905 m (6' 3\")   Wt 117.2 kg (258 lb 4.8 oz)   SpO2 99%   BMI 32.29 kg/m    BMI= Body mass index is 32.29 kg/m .    WBC Count   Date Value Ref Range Status   04/12/2025 12.0 (H) 4.0 - 11.0 10e3/uL Final     Hemaglobin:12.9 (4/8/2025)    A1C: 12.3 (4/7/2025)    General:  Patient is alert and orientated.  NAD.    Vascular:  DP and PT pulses are faintly palpable.  No varicosities noted  CFT's < 3secs.  Skin temp is normal.     Neuro:  Light and gross touch sensation is absent to feet.     Derm:  Dressing is c/d/I. Sutures intact. Wound bed large and with exposed tendons. No purulence. Cellulitis resolved. Some necrosis plantarly.     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     Cultures:  1+ Fusobacterium species Abnormal      Pathology (4/8/2025):     Final Diagnosis   Toe, left, fifth ray, resection-  Epidermal ulceration with associated acute osteomyelitis (see description)  Resection margin free of osteomyelitis  No evidence of malignancy     Pathology 4/11  Final Diagnosis   Bone, left foot, fifth metatarsal, biopsy-  Microscopic foci compatible with acute osteomyelitis     Assessment: 65-year-old uncontrolled diabetic male status post left foot fifth ray resection (4/8) with second debridement and further debridement on 4/11.     Plan:    -Discussed all findings with patient. Chart and imaging reviewed.     -Reviewed pathology results with patient. Result from 4/8 showed no osteomyelitis, but further proximal piece does show osteomyelitis. Given this and high potential for infection/nonhealing, recommendation made for a third debridement and further " bone resection. Will still need wound vac post op. Patient agrees to this.     -[facility]     -WB to heel of LLE.     -Will schedule procedure with Dr. Pagan for tomorrow. NPO after midnight.           Denisse Preciado DPM

## 2025-04-16 NOTE — PLAN OF CARE
"Pertinent assessments: Pt A&O, low grade temp 99.4, on RA, denies any pain. Wound vac in place. BG checks 149, 141. Up to void x 1 this AM with cam boot in place     Major Shift Events: Uneventful     Treatment Plan: Monitor L foot and wound vac output, BG checks, IV Cleocin, K/Mg/Phos protocol     Problem: Pain Acute  Goal: Optimal Pain Control and Function  Outcome: Progressing  Intervention: Optimize Psychosocial Wellbeing  Recent Flowsheet Documentation  Taken 4/15/2025 2014 by Alyssa Londono, RN  Diversional Activities: television  Intervention: Prevent or Manage Pain  Recent Flowsheet Documentation  Taken 4/15/2025 2014 by Alyssa Londono, RN  Medication Review/Management: medications reviewed     Problem: Adult Inpatient Plan of Care  Goal: Plan of Care Review  Description: The Plan of Care Review/Shift note should be completed every shift.  The Outcome Evaluation is a brief statement about your assessment that the patient is improving, declining, or no change.  This information will be displayed automatically on your shiftnote.  Outcome: Progressing  Flowsheets (Taken 4/16/2025 0617)  Outcome Evaluation: BG checks 149, 141, wound vac in place continues on IV cleocin  Plan of Care Reviewed With: patient  Overall Patient Progress: improving  Goal: Patient-Specific Goal (Individualized)  Description: You can add care plan individualizations to a care plan. Examples of Individualization might be:  \"Parent requests to be called daily at 9am for status\", \"I have a hard time hearing out of my right ear\", or \"Do not touch me to wake me up as it startlesme\".  Outcome: Progressing  Goal: Absence of Hospital-Acquired Illness or Injury  Outcome: Progressing  Intervention: Identify and Manage Fall Risk  Recent Flowsheet Documentation  Taken 4/15/2025 2014 by Alyssa Londono, RN  Safety Promotion/Fall Prevention:   activity supervised   lighting adjusted   nonskid shoes/slippers when out of bed   treat reversible " contributory factors   treat underlying cause  Intervention: Prevent Skin Injury  Recent Flowsheet Documentation  Taken 4/15/2025 2014 by Alyssa Londono RN  Body Position: position changed independently  Intervention: Prevent Infection  Recent Flowsheet Documentation  Taken 4/15/2025 2014 by Alyssa Londono RN  Infection Prevention:   cohorting utilized   hand hygiene promoted   rest/sleep promoted  Goal: Optimal Comfort and Wellbeing  Outcome: Progressing  Intervention: Provide Person-Centered Care  Recent Flowsheet Documentation  Taken 4/15/2025 2014 by Alyssa Londono RN  Trust Relationship/Rapport:   care explained   choices provided  Goal: Readiness for Transition of Care  Outcome: Progressing     Problem: Diabetes  Goal: Optimal Coping  Outcome: Progressing  Goal: Optimal Functional Ability  Outcome: Progressing  Intervention: Optimize Functional Ability  Recent Flowsheet Documentation  Taken 4/16/2025 0608 by Alyssa Londono RN  Assistive Device Utilized: walker  Activity Management: ambulated to bathroom  Activity Assistance Provided: assistance, 1 person  Taken 4/15/2025 2014 by Alyssa Londono RN  Assistive Device Utilized:   walker   gait belt  Activity Management: activity adjusted per tolerance  Activity Assistance Provided: assistance, 1 person  Goal: Blood Glucose Level Within Target Range  Outcome: Progressing  Goal: Minimize Risk of Hypoglycemia  Outcome: Progressing     Problem: Wound  Goal: Optimal Coping  Outcome: Progressing  Goal: Optimal Functional Ability  Outcome: Progressing  Intervention: Optimize Functional Ability  Recent Flowsheet Documentation  Taken 4/16/2025 0608 by Alyssa Londono RN  Assistive Device Utilized: walker  Activity Management: ambulated to bathroom  Activity Assistance Provided: assistance, 1 person  Taken 4/15/2025 2014 by Alyssa Londono RN  Assistive Device Utilized:   walker   gait belt  Activity Management: activity adjusted per tolerance  Activity  Assistance Provided: assistance, 1 person  Goal: Absence of Infection Signs and Symptoms  Outcome: Progressing  Goal: Improved Oral Intake  Outcome: Progressing  Goal: Optimal Pain Control and Function  Outcome: Progressing  Goal: Skin Health and Integrity  Outcome: Progressing  Intervention: Optimize Skin Protection  Recent Flowsheet Documentation  Taken 4/16/2025 0608 by Alyssa Londono, RN  Activity Management: ambulated to bathroom  Taken 4/15/2025 2014 by Alyssa Londono, RN  Activity Management: activity adjusted per tolerance  Head of Bed (HOB) Positioning: HOB at 20-30 degrees  Goal: Optimal Wound Healing  Outcome: Progressing   Goal Outcome Evaluation:      Plan of Care Reviewed With: patient    Overall Patient Progress: improvingOverall Patient Progress: improving    Outcome Evaluation: BG checks 149, 141, wound vac in place continues on IV cleocin

## 2025-04-16 NOTE — PLAN OF CARE
"Pertinent assessments: A&O, up sba with walker in room, VSS voiding freely, passing gas with recent bm. Pt denies pain sob or nausea. Pt accepting of insulin admin teaching and further diabetic education. L leg elevated, cms intact, wound vac dressing changed per WOC. Cont IV ABX.    Major Shift Events: Podiatry saw and rec debridement, then re-apply wound vac Friday, hopeful to discharge then.     Treatment Plan: Plan for debridement 4/17, NPO at midnight.     Bedside Nurse: Katiana Stein RN         Problem: Pain Acute  Goal: Optimal Pain Control and Function  Outcome: Progressing  Intervention: Prevent or Manage Pain  Recent Flowsheet Documentation  Taken 4/16/2025 1000 by Katiana Stein RN  Medication Review/Management: medications reviewed     Problem: Adult Inpatient Plan of Care  Goal: Plan of Care Review  Description: The Plan of Care Review/Shift note should be completed every shift.  The Outcome Evaluation is a brief statement about your assessment that the patient is improving, declining, or no change.  This information will be displayed automatically on your shiftnote.  Outcome: Progressing  Flowsheets (Taken 4/16/2025 1444)  Outcome Evaluation: Treatment Plan: Plan for debridement 4/17, NPO at midnight.  Plan of Care Reviewed With: patient  Overall Patient Progress: improving  Goal: Patient-Specific Goal (Individualized)  Description: You can add care plan individualizations to a care plan. Examples of Individualization might be:  \"Parent requests to be called daily at 9am for status\", \"I have a hard time hearing out of my right ear\", or \"Do not touch me to wake me up as it startlesme\".  Outcome: Progressing  Goal: Absence of Hospital-Acquired Illness or Injury  Outcome: Progressing  Intervention: Identify and Manage Fall Risk  Recent Flowsheet Documentation  Taken 4/16/2025 1000 by Katiana Stein RN  Safety Promotion/Fall Prevention:   activity supervised   increased rounding and " observation   increase visualization of patient   mobility aid in reach   nonskid shoes/slippers when out of bed  Goal: Optimal Comfort and Wellbeing  Outcome: Progressing  Goal: Readiness for Transition of Care  Outcome: Progressing     Problem: Diabetes  Goal: Optimal Coping  Outcome: Progressing  Goal: Optimal Functional Ability  Outcome: Progressing  Intervention: Optimize Functional Ability  Recent Flowsheet Documentation  Taken 4/16/2025 0915 by Katiana Stein RN  Assistive Device Utilized: walker  Activity Management: ambulated to bathroom  Activity Assistance Provided: assistance, stand-by  Goal: Blood Glucose Level Within Target Range  Outcome: Progressing  Goal: Minimize Risk of Hypoglycemia  Outcome: Progressing     Problem: Wound  Goal: Optimal Coping  Outcome: Progressing  Goal: Optimal Functional Ability  Outcome: Progressing  Intervention: Optimize Functional Ability  Recent Flowsheet Documentation  Taken 4/16/2025 0915 by Katiana Stein RN  Assistive Device Utilized: walker  Activity Management: ambulated to bathroom  Activity Assistance Provided: assistance, stand-by  Goal: Absence of Infection Signs and Symptoms  Outcome: Progressing  Goal: Improved Oral Intake  Outcome: Progressing  Goal: Optimal Pain Control and Function  Outcome: Progressing  Goal: Skin Health and Integrity  Outcome: Progressing  Intervention: Optimize Skin Protection  Recent Flowsheet Documentation  Taken 4/16/2025 0915 by Katiana Stein RN  Activity Management: ambulated to bathroom  Goal: Optimal Wound Healing  Outcome: Progressing   Goal Outcome Evaluation:      Plan of Care Reviewed With: patient    Overall Patient Progress: improvingOverall Patient Progress: improving    Outcome Evaluation: Treatment Plan: Plan for debridement 4/17, NPO at midnight.

## 2025-04-16 NOTE — PROGRESS NOTES
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  Wound Vac dressing changed on 4/16.  Pathology showing osteomyelitis and plan is to return to OR on 4/17 and continue VAC after.      WO nurse follow-up plan: Monday/WednesdayFriday    Patient History (according to provider note(s):      \"Patient presented with a left foot wound that developed after he used a razor blade to shave a callus on his left foot one week prior to admission. He has not been taking medications nor caring for his diabetes. Left Foot Xray revealed extensive soft tissue gas within the dorsal forefoot along the second through fifth metacarpal heads/necks and surrounding the fifth MTP joint, highly concerning for necrotizing soft tissue infection. There is some localized osteopenia at the medial base of the fifth proximal phalanx which may be secondary to superimposition of the soft tissue gas. No discrete osseous destructive change. Minimal scattered degenerative arthritis. Podiatry consulted and patient taken on 4/8/25 for left 5th ray resection with debridement.    Only to be weight bearing to heal on the left lower extremity, Cam boot per podiatry.\"    Assessment:      Areas visualized during today's visit: Focused:, Foot, and Left    Negative pressure wound therapy applied to: L foot      Last photo: 4/14   Wound due to: Trauma/ Used a razor blade to shave a callus   Wound history/plan of care:    Surgical date: 4/8  Service following: Podiatry and WOC   Date Negative Pressure Wound Therapy initiated: 4/14   Interventions in place: offloading and elevation  Is patient s nutritional status compromised? no   If yes, what interventions are in place? N/A  Reason for initiating vac therapy? High risk of infections and Need for accelerated granulation " tissue  Which?of?the?following?co-morbidities?apply? Diabetes and Obesity  If diabetic is patient on a diabetic management program? Yes   Is osteomyelitis present in wound? no   If yes what treatments are in place? IV antibiotic cleocin    Wound base: 40 % Fibrin, Moist, Smooth , and Yellow, 20 % Tendon , 40 % Moist, red tissue     Palpation of the wound bed: normal       Drainage: moderate      Volume in cannister: 50     Last cannister change date: 4/14     Description of drainage: serosanguinous      Measurements (length x width x depth, in cm) 9  x 6.5  x  0.7 cm       Tunneling N/A      Undermining N/A   Periwound skin: Intact and Dry/scaly,       Color: dusky and black on lateral edge and pink        Temperature: cool   Odor: none   Pain: denies   Pain intervention prior to dressing change: patient tolerated well, slow and gentle cares , and distraction  Treatment goal: Drainage control, Infection control/prevention, and Increase granulation  STATUS: evolving   Supplies ordered: gathered, supplies stored on unit, and discussed with patient     Number of foam pieces removed from a wound (excluding foam for bridge) :  1 GranuFoam Black and 2 Oil emulsion dressing   Verified this matched the number of foam pieces applied last dressing change: Yes  Number of foam pieces packed into wound (excluding foam for bridge) :  1 GranuFoam Black and 2 Oil emulsion dressing     Treatment Plan:     Negative pressure wound therapy plan:  Wound location:  L foot   Change Days: Mon/Wed/Fri by WOC RN    Supplies (including all accessories) used: medium Black foam , Adaptic/Curity oil emulsion contact layer , Adapt barrier ring, and Cavilon no sting barrier film  Cleanse with Vashe prior to replacing NPWT  Suction setting: -125   Methods used: Window paned all periwound skin with vac drape prior to applying sponge, Cut foam in half to reduce profile, and Placed barrier ring into periwound creases to improve seal    Staff RN to  "assess integrity of dressing and ensure suction is set at appropriate level every shift.   Date canister. Chart canister output every shift. Change cannister weekly and PRN if full/occluded     Remove foam dressing and replace with BID normal saline moist gauze dressing if:   -a dressing failure which cannot be repaired within 2 hours   -patient is discharging to home without a home pump   -patient is discharging to a facility outside the local area   -if a dressing is a \"Silver Foam\", remove before Radiation Therapy or MRI     The hospital VAC pump is not to be discharged with the patient. Please disconnect the patient from the machine prior to discharge.  If a home VAC pump has been delivered, connect the home cannister to dressing tubing and the cannister to the home pump, turn on home pump  If the patient is transferring to a nearby facility with a VAC, the tubing can be disconnected, clamp tubing and cover the end with a glove, then can be reconnected if within 2 hours  If transfer will be longer than 2 hours, dressing must be removed and placed with a wet to moist gauze dressing for transfer      Orders: Reviewed    RECOMMEND PRIMARY TEAM ORDER: None, at this time  Education provided: plan of care, wound progress, Infection prevention , and Off-loading pressure  Discussed plan of care with: Patient  Notify WOC if wound(s) deteriorate.  Nursing to notify the Provider(s) and re-consult the WOC Nurse if new skin concern.    DATA:     Current support surface: Standard  Standard gel mattress (Isoflex)  Containment of urine/stool: Continent of bladder and Continent of bowel  BMI: Body mass index is 32.29 kg/m .   Active diet order: Orders Placed This Encounter      Moderate Consistent Carb (60 g CHO per Meal) Diet     Output: No intake/output data recorded.     Labs:   Recent Labs   Lab 04/12/25  1013   WBC 12.0*     Pressure injury risk assessment:   Sensory Perception: 4-->no impairment  Moisture: 4-->rarely " moist  Activity: 3-->walks occasionally  Mobility: 3-->slightly limited  Nutrition: 3-->adequate  Friction and Shear: 3-->no apparent problem  Robel Score: 20    Fred Schumacher RN CWOCN  Contact [facility] Nurse ([place])  Dept. Office Number: [phone]

## 2025-04-16 NOTE — PROGRESS NOTES
Care Management Follow Up    Length of Stay (days): 9    Expected Discharge Date: 04/17/2025     Concerns to be Addressed: discharge planning     Patient plan of care discussed at interdisciplinary rounds: Yes    Anticipated Discharge Disposition: Home Care     Anticipated Discharge Services: Home Care  Anticipated Discharge DME: Wound Vac    Patient/family educated on Medicare website which has current facility and service quality ratings:  yes  Education Provided on the Discharge Plan:  yes  Patient/Family in Agreement with the Plan:  yes    Referrals Placed by CM/SW: Homecare    Additional Information:  Planning patient discharge tomorrow 4/17/25. Patient will discharge to home with home wound vac. [facility] has accepted referral for RN for wound vac dressing changes. [facility] updated via epic and via phone call to their intake dept that patient will discharge tomorrow and require a home care RN visit on 4/18 for vac dressing change. [facility] has a place osorio for patient for a SOC visit on Friday.     Next Steps: Coordinate discharge to home with home wound vac on 4/17/25.      Vania Armando RN  Care Coordinator  [facility]  [phone]

## 2025-04-16 NOTE — PROGRESS NOTES
[facility]    Medicine Progress Note - Hospitalist Service    Date of Admission:  4/7/2025    Assessment & Plan   Justino Giordano is a 65 year old male with a history of HTN, DM Type 2, HLD, Obesity who presents to the ED today with a left foot injury.        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  He has not been taking medications nor caring for his diabetes. .  He was afebrile, hypertensive, HR 90, RR 20.  WBC 16.4,  with negative lactic acid.  Left Foot Xray revealed extensive soft tissue gas within the dorsal forefoot along the second through fifth metacarpal heads/necks and surrounding the fifth MTP joint, highly concerning for necrotizing soft tissue infection. There is some localized osteopenia at the medial base of the fifth proximal phalanx which may be secondary to superimposition of the soft tissue gas. No discrete osseous destructive change. Minimal scattered degenerative arthritis.  He was placed on IV fluids and made NPO.  He was empirically placed on Vancomycin, Zosyn, Clindamycin. Wound Culture with gram stain with a  polymicrobial infection with culture showing laura and fusobacterium on admission and from surgery.  Will discontinue zosyn/vanco and continue on IV clindamycin, transition to oral when ok with podiatry, awaiting pathology to see if he requires further surgery/debridement.     Podiatry consulted and patient taken on 4/8/25 for left 5th ray resection with debridement.    Only to be weight bearing to heal on the left lower extremity, Cam boot per podiatry.  Patient works as a  for [employer]. Patient had more debridement per podiatry on 4/11, plan to go home with a wound vac, social work working on.  Wound care per podiatry .  Pain well controlled, likely with severe neuropathy. Physical therapy to see to make sure he will be safe for home. Recommend outpatient physical therapy.  Patient had a normal ARASELI US of the lower extremity.      Noncompliance  Patient has not been taking any medications nor caring for his diabetes, hypertension.  This puts him at risk for readmissions, morbidity, and early mortality. We discussed the importance of taking care of his medical issues at length. Need to make medications and care as easy as possible for him so that he will be compliant.       Untreated and Uncontrolled type 2 diabetes, will be insulin dependent with peripheral neuropathy  Hgb A1C 11.7 (4/2021) and was 6.6 in (2013).  BS on admission 282 with hemoglobin A1c up to 12.3.  Bicarb 20, AG 19.  Not technically DKA by criteria.  Patient denies he has a history of diabetes, although reports he has had high blood sugars in the past.  No longer takes Metformin and never started Lantus as recommended in 2021. Given the elevated A1c above 10 will need insulin.  Was started on lantus and an insulin correction scale.  Needs close primary care provider follow up with diabetic education.   Blood sugar 200s, continue on lantus and increase his prandial dosing.  I doubt with his compliance issues that he can do carb counting.     Needs ongoing insulin teaching per nursing     Hypertension, hyperlipidemia  -Previously on Atorvastatin, no longer takes this.  BP elevated 150-170s on arrival. , HDL 41, .  Will start on lipitor.   BP currently  138-150s/80s  - monitor bp trend,  - Ace inhibitor added 4/9. Added hydrochlorothiazide. 4/13, will need to  follow his BMP and BP in a few weeks with a primary care provider which he needs to establish     Hyponatremia, Hypochloremia  Sodium when corrected for glucose is 131, chloride 88.  - IVF hydration now off of IV fluids   -repeat BMP in am showing sodium stable 131. Creatinine normal         Diet:  "Moderate Consistent Carb (60 g CHO per Meal) Diet    DVT Prophylaxis: Pneumatic Compression Devices  Nuñez Catheter: Not present  Lines: None     Cardiac Monitoring: None  Code Status: Full Code      Clinically Significant Risk Factors                   # Hypertension: Noted on problem list           # DMII: A1C = 12.3 % (Ref range: <5.7 %) within past 6 months   # Obesity: Estimated body mass index is 32.29 kg/m  as calculated from the following:    Height as of this encounter: 1.905 m (6' 3\").    Weight as of this encounter: 117.2 kg (258 lb 4.8 oz).        # Financial/Environmental Concerns: other (see comments) (pt expressed concerns about being off of work and without pay)         Social Drivers of Health            Disposition Plan     Medically Ready for Discharge: Anticipated Tomorrow             Marcial Fried MD  Hospitalist Service  [facility]  Securely message with [employer] (more info)  Text page via [employer] Paging/Directory   ______________________________________________________________________    Interval History   Asymptomatic, feels better but DPM told me he needs more debridement    Physical Exam   Vital Signs: Temp: 98.1  F (36.7  C) Temp src: Oral BP: 123/76 Pulse: 72   Resp: 19 SpO2: 99 % O2 Device: None (Room air)    Weight: 258 lbs 4.8 oz    Constitutional: awake, alert, cooperative, no apparent distress, and appears stated age  Respiratory: No increased work of breathing, good air exchange, clear to auscultation bilaterally, no crackles or wheezing  Cardiovascular: Normal apical impulse, regular rate and rhythm, normal S1 and S2, no S3 or S4, and no murmur noted    Medical Decision Making       36 MINUTES SPENT BY ME on the date of service doing chart review, history, exam, documentation & further activities per the note.      Data         Imaging results reviewed over the past 24 hrs:   No results found for this or any previous visit (from the past 24 hours).  "

## 2025-04-17 ENCOUNTER — APPOINTMENT (OUTPATIENT)
Dept: GENERAL RADIOLOGY | Facility: CLINIC | Age: 65
End: 2025-04-17
Attending: PODIATRIST
Payer: MEDICARE

## 2025-04-17 ENCOUNTER — APPOINTMENT (OUTPATIENT)
Dept: CARDIOLOGY | Facility: CLINIC | Age: 65
End: 2025-04-17
Attending: HOSPITALIST
Payer: MEDICARE

## 2025-04-17 ENCOUNTER — ANESTHESIA EVENT (OUTPATIENT)
Dept: SURGERY | Facility: CLINIC | Age: 65
End: 2025-04-17
Payer: MEDICARE

## 2025-04-17 ENCOUNTER — ANESTHESIA (OUTPATIENT)
Dept: SURGERY | Facility: CLINIC | Age: 65
End: 2025-04-17
Payer: MEDICARE

## 2025-04-17 VITALS
BODY MASS INDEX: 31.3 KG/M2 | DIASTOLIC BLOOD PRESSURE: 61 MMHG | OXYGEN SATURATION: 96 % | SYSTOLIC BLOOD PRESSURE: 95 MMHG | HEIGHT: 75 IN | HEART RATE: 66 BPM | RESPIRATION RATE: 17 BRPM | TEMPERATURE: 98.5 F | WEIGHT: 251.77 LBS

## 2025-04-17 LAB
ATRIAL RATE - MUSE: NORMAL BPM
DIASTOLIC BLOOD PRESSURE - MUSE: NORMAL MMHG
GLUCOSE BLDC GLUCOMTR-MCNC: 111 MG/DL (ref 70–99)
GLUCOSE BLDC GLUCOMTR-MCNC: 124 MG/DL (ref 70–99)
GLUCOSE BLDC GLUCOMTR-MCNC: 135 MG/DL (ref 70–99)
GLUCOSE BLDC GLUCOMTR-MCNC: 139 MG/DL (ref 70–99)
GLUCOSE BLDC GLUCOMTR-MCNC: 151 MG/DL (ref 70–99)
GLUCOSE BLDC GLUCOMTR-MCNC: 164 MG/DL (ref 70–99)
INTERPRETATION ECG - MUSE: NORMAL
LVEF ECHO: NORMAL
MAGNESIUM SERPL-MCNC: 1.8 MG/DL (ref 1.7–2.3)
P AXIS - MUSE: NORMAL DEGREES
PHOSPHATE SERPL-MCNC: 3.8 MG/DL (ref 2.5–4.5)
POTASSIUM SERPL-SCNC: 3.3 MMOL/L (ref 3.4–5.3)
POTASSIUM SERPL-SCNC: 4.4 MMOL/L (ref 3.4–5.3)
PR INTERVAL - MUSE: NORMAL MS
QRS DURATION - MUSE: 96 MS
QT - MUSE: 508 MS
QTC - MUSE: 540 MS
R AXIS - MUSE: -37 DEGREES
SYSTOLIC BLOOD PRESSURE - MUSE: NORMAL MMHG
T AXIS - MUSE: 14 DEGREES
VENTRICULAR RATE- MUSE: 68 BPM

## 2025-04-17 PROCEDURE — 0Y6Y0Z0 DETACHMENT AT LEFT 5TH TOE, COMPLETE, OPEN APPROACH: ICD-10-PCS | Performed by: PODIATRIST

## 2025-04-17 PROCEDURE — 120N000001 HC R&B MED SURG/OB

## 2025-04-17 PROCEDURE — 93306 TTE W/DOPPLER COMPLETE: CPT | Mod: 26 | Performed by: INTERNAL MEDICINE

## 2025-04-17 PROCEDURE — 0JBR0ZZ EXCISION OF LEFT FOOT SUBCUTANEOUS TISSUE AND FASCIA, OPEN APPROACH: ICD-10-PCS | Performed by: PODIATRIST

## 2025-04-17 PROCEDURE — 88311 DECALCIFY TISSUE: CPT | Mod: 26 | Performed by: PATHOLOGY

## 2025-04-17 PROCEDURE — 93010 ELECTROCARDIOGRAM REPORT: CPT | Performed by: INTERNAL MEDICINE

## 2025-04-17 PROCEDURE — 258N000003 HC RX IP 258 OP 636: Performed by: ANESTHESIOLOGY

## 2025-04-17 PROCEDURE — 250N000013 HC RX MED GY IP 250 OP 250 PS 637: Performed by: INTERNAL MEDICINE

## 2025-04-17 PROCEDURE — 28122 PARTIAL REMOVAL OF FOOT BONE: CPT | Mod: 58 | Performed by: PODIATRIST

## 2025-04-17 PROCEDURE — 250N000009 HC RX 250: Performed by: NURSE ANESTHETIST, CERTIFIED REGISTERED

## 2025-04-17 PROCEDURE — 999N000065 XR FOOT PORT LEFT 3 VIEWS: Mod: LT

## 2025-04-17 PROCEDURE — 83735 ASSAY OF MAGNESIUM: CPT | Performed by: HOSPITALIST

## 2025-04-17 PROCEDURE — 255N000002 HC RX 255 OP 636: Performed by: HOSPITALIST

## 2025-04-17 PROCEDURE — 250N000011 HC RX IP 250 OP 636: Performed by: NURSE ANESTHETIST, CERTIFIED REGISTERED

## 2025-04-17 PROCEDURE — 84132 ASSAY OF SERUM POTASSIUM: CPT | Performed by: INTERNAL MEDICINE

## 2025-04-17 PROCEDURE — 250N000011 HC RX IP 250 OP 636: Mod: JZ | Performed by: PHYSICIAN ASSISTANT

## 2025-04-17 PROCEDURE — 99233 SBSQ HOSP IP/OBS HIGH 50: CPT | Performed by: HOSPITALIST

## 2025-04-17 PROCEDURE — 250N000013 HC RX MED GY IP 250 OP 250 PS 637: Performed by: PODIATRIST

## 2025-04-17 PROCEDURE — 360N000076 HC SURGERY LEVEL 3, PER MIN: Performed by: PODIATRIST

## 2025-04-17 PROCEDURE — 999N000141 HC STATISTIC PRE-PROCEDURE NURSING ASSESSMENT: Performed by: PODIATRIST

## 2025-04-17 PROCEDURE — 84100 ASSAY OF PHOSPHORUS: CPT | Performed by: HOSPITALIST

## 2025-04-17 PROCEDURE — 710N000012 HC RECOVERY PHASE 2, PER MINUTE: Performed by: PODIATRIST

## 2025-04-17 PROCEDURE — 258N000003 HC RX IP 258 OP 636: Performed by: NURSE ANESTHETIST, CERTIFIED REGISTERED

## 2025-04-17 PROCEDURE — 710N000009 HC RECOVERY PHASE 1, LEVEL 1, PER MIN: Performed by: PODIATRIST

## 2025-04-17 PROCEDURE — 999N000208 ECHOCARDIOGRAM COMPLETE

## 2025-04-17 PROCEDURE — 88311 DECALCIFY TISSUE: CPT | Mod: TC | Performed by: PODIATRIST

## 2025-04-17 PROCEDURE — 250N000011 HC RX IP 250 OP 636: Performed by: PODIATRIST

## 2025-04-17 PROCEDURE — 272N000001 HC OR GENERAL SUPPLY STERILE: Performed by: PODIATRIST

## 2025-04-17 PROCEDURE — 36415 COLL VENOUS BLD VENIPUNCTURE: CPT | Performed by: HOSPITALIST

## 2025-04-17 PROCEDURE — 84132 ASSAY OF SERUM POTASSIUM: CPT | Performed by: HOSPITALIST

## 2025-04-17 PROCEDURE — 370N000017 HC ANESTHESIA TECHNICAL FEE, PER MIN: Performed by: PODIATRIST

## 2025-04-17 RX ORDER — PROCHLORPERAZINE MALEATE 5 MG/1
5 TABLET ORAL EVERY 6 HOURS PRN
Status: DISCONTINUED | OUTPATIENT
Start: 2025-04-17 | End: 2025-04-19 | Stop reason: HOSPADM

## 2025-04-17 RX ORDER — PROPOFOL 10 MG/ML
INJECTION, EMULSION INTRAVENOUS CONTINUOUS PRN
Status: DISCONTINUED | OUTPATIENT
Start: 2025-04-17 | End: 2025-04-17

## 2025-04-17 RX ORDER — ACETAMINOPHEN 325 MG/1
975 TABLET ORAL EVERY 8 HOURS
Status: DISCONTINUED | OUTPATIENT
Start: 2025-04-17 | End: 2025-04-19 | Stop reason: HOSPADM

## 2025-04-17 RX ORDER — HYDROMORPHONE HCL IN WATER/PF 6 MG/30 ML
0.4 PATIENT CONTROLLED ANALGESIA SYRINGE INTRAVENOUS EVERY 5 MIN PRN
Status: DISCONTINUED | OUTPATIENT
Start: 2025-04-17 | End: 2025-04-17

## 2025-04-17 RX ORDER — HYDROMORPHONE HCL IN WATER/PF 6 MG/30 ML
0.2 PATIENT CONTROLLED ANALGESIA SYRINGE INTRAVENOUS EVERY 5 MIN PRN
Status: DISCONTINUED | OUTPATIENT
Start: 2025-04-17 | End: 2025-04-17

## 2025-04-17 RX ORDER — OXYCODONE HYDROCHLORIDE 5 MG/1
5 TABLET ORAL EVERY 4 HOURS PRN
Status: DISCONTINUED | OUTPATIENT
Start: 2025-04-17 | End: 2025-04-19 | Stop reason: HOSPADM

## 2025-04-17 RX ORDER — BUPIVACAINE HYDROCHLORIDE 5 MG/ML
INJECTION, SOLUTION EPIDURAL; INTRACAUDAL; PERINEURAL PRN
Status: DISCONTINUED | OUTPATIENT
Start: 2025-04-17 | End: 2025-04-17

## 2025-04-17 RX ORDER — LIDOCAINE HYDROCHLORIDE 20 MG/ML
INJECTION, SOLUTION INFILTRATION; PERINEURAL PRN
Status: DISCONTINUED | OUTPATIENT
Start: 2025-04-17 | End: 2025-04-17

## 2025-04-17 RX ORDER — SODIUM CHLORIDE, SODIUM LACTATE, POTASSIUM CHLORIDE, CALCIUM CHLORIDE 600; 310; 30; 20 MG/100ML; MG/100ML; MG/100ML; MG/100ML
INJECTION, SOLUTION INTRAVENOUS CONTINUOUS
Status: DISCONTINUED | OUTPATIENT
Start: 2025-04-17 | End: 2025-04-17

## 2025-04-17 RX ORDER — FENTANYL CITRATE 50 UG/ML
25 INJECTION, SOLUTION INTRAMUSCULAR; INTRAVENOUS EVERY 5 MIN PRN
Status: DISCONTINUED | OUTPATIENT
Start: 2025-04-17 | End: 2025-04-17

## 2025-04-17 RX ORDER — POTASSIUM CHLORIDE 1500 MG/1
40 TABLET, EXTENDED RELEASE ORAL ONCE
Status: COMPLETED | OUTPATIENT
Start: 2025-04-17 | End: 2025-04-17

## 2025-04-17 RX ORDER — NALOXONE HYDROCHLORIDE 0.4 MG/ML
0.2 INJECTION, SOLUTION INTRAMUSCULAR; INTRAVENOUS; SUBCUTANEOUS
Status: DISCONTINUED | OUTPATIENT
Start: 2025-04-17 | End: 2025-04-19 | Stop reason: HOSPADM

## 2025-04-17 RX ORDER — POLYETHYLENE GLYCOL 3350 17 G/17G
17 POWDER, FOR SOLUTION ORAL DAILY
Status: DISCONTINUED | OUTPATIENT
Start: 2025-04-18 | End: 2025-04-19 | Stop reason: HOSPADM

## 2025-04-17 RX ORDER — NALOXONE HYDROCHLORIDE 0.4 MG/ML
0.4 INJECTION, SOLUTION INTRAMUSCULAR; INTRAVENOUS; SUBCUTANEOUS
Status: DISCONTINUED | OUTPATIENT
Start: 2025-04-17 | End: 2025-04-19 | Stop reason: HOSPADM

## 2025-04-17 RX ORDER — NALOXONE HYDROCHLORIDE 0.4 MG/ML
0.1 INJECTION, SOLUTION INTRAMUSCULAR; INTRAVENOUS; SUBCUTANEOUS
Status: DISCONTINUED | OUTPATIENT
Start: 2025-04-17 | End: 2025-04-17

## 2025-04-17 RX ORDER — BISACODYL 10 MG
10 SUPPOSITORY, RECTAL RECTAL DAILY PRN
Status: DISCONTINUED | OUTPATIENT
Start: 2025-04-20 | End: 2025-04-19 | Stop reason: HOSPADM

## 2025-04-17 RX ORDER — LIDOCAINE 40 MG/G
CREAM TOPICAL
Status: DISCONTINUED | OUTPATIENT
Start: 2025-04-17 | End: 2025-04-19 | Stop reason: HOSPADM

## 2025-04-17 RX ORDER — FENTANYL CITRATE 50 UG/ML
50 INJECTION, SOLUTION INTRAMUSCULAR; INTRAVENOUS EVERY 5 MIN PRN
Status: DISCONTINUED | OUTPATIENT
Start: 2025-04-17 | End: 2025-04-17

## 2025-04-17 RX ORDER — LABETALOL HYDROCHLORIDE 5 MG/ML
10 INJECTION, SOLUTION INTRAVENOUS
Status: DISCONTINUED | OUTPATIENT
Start: 2025-04-17 | End: 2025-04-17

## 2025-04-17 RX ORDER — OXYCODONE HYDROCHLORIDE 5 MG/1
10 TABLET ORAL EVERY 4 HOURS PRN
Status: DISCONTINUED | OUTPATIENT
Start: 2025-04-17 | End: 2025-04-19 | Stop reason: HOSPADM

## 2025-04-17 RX ORDER — PROPOFOL 10 MG/ML
INJECTION, EMULSION INTRAVENOUS PRN
Status: DISCONTINUED | OUTPATIENT
Start: 2025-04-17 | End: 2025-04-17

## 2025-04-17 RX ADMIN — PROPOFOL 150 MCG/KG/MIN: 10 INJECTION, EMULSION INTRAVENOUS at 10:10

## 2025-04-17 RX ADMIN — PHENYLEPHRINE HYDROCHLORIDE 100 MCG: 10 INJECTION INTRAVENOUS at 10:30

## 2025-04-17 RX ADMIN — LISINOPRIL 20 MG: 20 TABLET ORAL at 08:24

## 2025-04-17 RX ADMIN — HUMAN ALBUMIN MICROSPHERES AND PERFLUTREN 3 ML: 10; .22 INJECTION, SOLUTION INTRAVENOUS at 15:32

## 2025-04-17 RX ADMIN — ACETAMINOPHEN 975 MG: 325 TABLET, FILM COATED ORAL at 20:09

## 2025-04-17 RX ADMIN — POTASSIUM CHLORIDE 40 MEQ: 20 TABLET, EXTENDED RELEASE ORAL at 02:18

## 2025-04-17 RX ADMIN — ATORVASTATIN CALCIUM 40 MG: 40 TABLET, FILM COATED ORAL at 20:09

## 2025-04-17 RX ADMIN — CLINDAMYCIN PHOSPHATE 900 MG: 900 INJECTION, SOLUTION INTRAVENOUS at 04:43

## 2025-04-17 RX ADMIN — MIDAZOLAM 2 MG: 1 INJECTION INTRAMUSCULAR; INTRAVENOUS at 10:08

## 2025-04-17 RX ADMIN — SODIUM CHLORIDE, SODIUM LACTATE, POTASSIUM CHLORIDE, AND CALCIUM CHLORIDE: .6; .31; .03; .02 INJECTION, SOLUTION INTRAVENOUS at 10:06

## 2025-04-17 RX ADMIN — CLINDAMYCIN PHOSPHATE 900 MG: 900 INJECTION, SOLUTION INTRAVENOUS at 20:09

## 2025-04-17 RX ADMIN — LIDOCAINE HYDROCHLORIDE 50 MG: 20 INJECTION, SOLUTION INFILTRATION; PERINEURAL at 10:10

## 2025-04-17 RX ADMIN — HYDROCHLOROTHIAZIDE 25 MG: 25 TABLET ORAL at 08:24

## 2025-04-17 RX ADMIN — PHENYLEPHRINE HYDROCHLORIDE 100 MCG: 10 INJECTION INTRAVENOUS at 10:24

## 2025-04-17 RX ADMIN — CLINDAMYCIN PHOSPHATE 900 MG: 900 INJECTION, SOLUTION INTRAVENOUS at 12:50

## 2025-04-17 RX ADMIN — PROPOFOL 50 MG: 10 INJECTION, EMULSION INTRAVENOUS at 10:10

## 2025-04-17 RX ADMIN — PHENYLEPHRINE HYDROCHLORIDE 200 MCG: 10 INJECTION INTRAVENOUS at 10:32

## 2025-04-17 ASSESSMENT — ACTIVITIES OF DAILY LIVING (ADL)
ADLS_ACUITY_SCORE: 41
ADLS_ACUITY_SCORE: 37
ADLS_ACUITY_SCORE: 41
ADLS_ACUITY_SCORE: 37
ADLS_ACUITY_SCORE: 37
ADLS_ACUITY_SCORE: 41
ADLS_ACUITY_SCORE: 37
ADLS_ACUITY_SCORE: 41
ADLS_ACUITY_SCORE: 37
ADLS_ACUITY_SCORE: 37
ADLS_ACUITY_SCORE: 41
ADLS_ACUITY_SCORE: 41
ADLS_ACUITY_SCORE: 37
ADLS_ACUITY_SCORE: 37
ADLS_ACUITY_SCORE: 41
ADLS_ACUITY_SCORE: 37
ADLS_ACUITY_SCORE: 41
ADLS_ACUITY_SCORE: 37
ADLS_ACUITY_SCORE: 41
ADLS_ACUITY_SCORE: 37
ADLS_ACUITY_SCORE: 37

## 2025-04-17 NOTE — OP NOTE
[facility] Podiatry Operative Note    Patient Name:                           Justino Giordano  Patient YOB: 1960  Date of Surgery:                       4/17/2025  CSN:                                         [id number]    Pre-operative diagnosis: Gas gangrene of foot (H) [A48.0]   Post-operative diagnosis: Same   Procedure: 1.  Repeat incision and debridement left foot wound  2.  Left fifth metatarsal bone excision   Surgeon: Bria Pagan DPM, Podiatry/Foot and Ankle Surgery   Assistant(s): none   Anesthesia: MAC with local      Hemostasis:                             Electrocautery    Estimated Blood Loss:              10 mL    Speceimens:                            Remaining left fifth metatarsal bone for pathology    Materials:                                  None      Indications: Mr. Ibarra is a 65-year-old diabetic male that presented to the hospital with gas gangrene infection in his left foot.  He underwent 2 previous debridements on his foot to try to help control infection.  Most recent pathology came back showing residual bone infection in the fifth metatarsal.  It was discussed going in and removing the remaining fifth metatarsal bone to get rid of all the infection.  Risk benefits and complications were discussed with patient and no guarantees were made.  He wishes to proceed with surgery.      Procecure: Brought to the operating room placed operating table in the supine position.  Anesthesia was administered and local was injected.  The foot was prepped and draped using sterile technique.  Attention was directed to the remaining distal one third of the fifth metatarsal in the wound.  This was sharply excised out with a #15 blade and sent to pathology.  No purulent drainage was noted.  The remaining nonviable tissue around the wound edges were removed with a 15 blade.  Bleeding was controlled with pressure and electrocautery.  Wound was flushed copious amounts  normal saline.  2 retention sutures were placed on the lateral aspect of the foot where the fifth metatarsal bone was.  The wound at this time measured approximately 13 cm x 9 cm x 1 cm in depth.  Tendon is exposed in the wound bed but the remaining wound bed after nonviable excision was granular.  Patient's foot was placed in a dry sterile dressing.  Patient Toller procedure and anesthesia well was transferred recovery with vital signs stable vascular status intact.    We will have the wound nurses replace the wound VAC on the patient tomorrow.  No further surgery is planned and he will likely be able to be discharged from podiatry standpoint tomorrow.  Patient is to be minimal heel weightbearing on the left foot in the boot.    Bria Pagan DPM

## 2025-04-17 NOTE — PROGRESS NOTES
Pt hooked to monitor d/t hypotension. Found to be in an abnormal rhythm. JULIANE Montilla notified. EKG obtained. Read shows atrial fibrillation. Pt now normotensive and remains rate controlled. Ok to send to inpatient floor. JULIANE to consult hospitalist.    Endoscopy

## 2025-04-17 NOTE — PLAN OF CARE
"Goal Outcome Evaluation:    Pertinent assessments: Pt A&O x4. VSS on RA. Denies pain, nausea, SOB. Up Ax1 with walker. PIV patent, saline locked. Infused clindamycin x1. B. CHG bath completed this morning. Wound vac in place. NPO status initiated at midnight in prep for procedure.    Major Shift Events: NPO status initiated at midnight, potassium replaced    Treatment Plan: I&D and further bone resection, NPO since midnight, diabetes/insulin education    Bedside Nurse: Jackie Sue RN      Plan of Care Reviewed With: patient    Overall Patient Progress: improvingOverall Patient Progress: improving    Outcome Evaluation: Denies pain. NPO since midnight. IV clindamycin x1.      Problem: Pain Acute  Goal: Optimal Pain Control and Function  Outcome: Progressing  Intervention: Prevent or Manage Pain  Recent Flowsheet Documentation  Taken 2025 0222 by Jackie Sue, RN  Sleep/Rest Enhancement: awakenings minimized  Medication Review/Management: medications reviewed     Problem: Adult Inpatient Plan of Care  Goal: Plan of Care Review  Description: The Plan of Care Review/Shift note should be completed every shift.  The Outcome Evaluation is a brief statement about your assessment that the patient is improving, declining, or no change.  This information will be displayed automatically on your shiftnote.  Outcome: Progressing  Flowsheets (Taken 2025 0660)  Outcome Evaluation: Denies pain. NPO since midnight. IV clindamycin x1.  Plan of Care Reviewed With: patient  Overall Patient Progress: improving  Goal: Patient-Specific Goal (Individualized)  Description: You can add care plan individualizations to a care plan. Examples of Individualization might be:  \"Parent requests to be called daily at 9am for status\", \"I have a hard time hearing out of my right ear\", or \"Do not touch me to wake me up as it startlesme\".  Outcome: Progressing  Goal: Absence of Hospital-Acquired Illness or Injury  Outcome: " Progressing  Intervention: Identify and Manage Fall Risk  Recent Flowsheet Documentation  Taken 4/17/2025 0222 by Jackie Sue, RN  Safety Promotion/Fall Prevention:   activity supervised   assistive device/personal items within reach   clutter free environment maintained   mobility aid in reach   nonskid shoes/slippers when out of bed   safety round/check completed  Intervention: Prevent Skin Injury  Recent Flowsheet Documentation  Taken 4/17/2025 0222 by Jackie Sue, RN  Body Position: position changed independently  Intervention: Prevent and Manage VTE (Venous Thromboembolism) Risk  Recent Flowsheet Documentation  Taken 4/17/2025 0222 by Jackie Sue, RN  VTE Prevention/Management: SCDs off (sequential compression devices)  Intervention: Prevent Infection  Recent Flowsheet Documentation  Taken 4/17/2025 0222 by Jackie Sue, RN  Infection Prevention:   rest/sleep promoted   single patient room provided  Goal: Optimal Comfort and Wellbeing  Outcome: Progressing  Intervention: Provide Person-Centered Care  Recent Flowsheet Documentation  Taken 4/17/2025 0222 by Jackie Sue RN  Trust Relationship/Rapport:   care explained   choices provided   questions answered  Goal: Readiness for Transition of Care  Outcome: Progressing     Problem: Diabetes  Goal: Optimal Coping  Outcome: Progressing  Goal: Optimal Functional Ability  Outcome: Progressing  Intervention: Optimize Functional Ability  Recent Flowsheet Documentation  Taken 4/17/2025 0222 by Jackie Sue, RN  Activity Management: activity adjusted per tolerance  Activity Assistance Provided: assistance, stand-by  Goal: Blood Glucose Level Within Target Range  Outcome: Progressing  Goal: Minimize Risk of Hypoglycemia  Outcome: Progressing     Problem: Wound  Goal: Optimal Coping  Outcome: Progressing  Goal: Optimal Functional Ability  Outcome: Progressing  Intervention: Optimize Functional Ability  Recent Flowsheet Documentation  Taken 4/17/2025 0222 by  Jackie Sue, RN  Activity Management: activity adjusted per tolerance  Activity Assistance Provided: assistance, stand-by  Goal: Absence of Infection Signs and Symptoms  Outcome: Progressing  Goal: Improved Oral Intake  Outcome: Progressing  Goal: Optimal Pain Control and Function  Outcome: Progressing  Intervention: Prevent or Manage Pain  Recent Flowsheet Documentation  Taken 4/17/2025 0222 by Jackie Sue RN  Sleep/Rest Enhancement: awakenings minimized  Goal: Skin Health and Integrity  Outcome: Progressing  Intervention: Optimize Skin Protection  Recent Flowsheet Documentation  Taken 4/17/2025 0222 by Jackie Sue, RN  Activity Management: activity adjusted per tolerance  Head of Bed (HOB) Positioning: HOB at 20-30 degrees  Goal: Optimal Wound Healing  Outcome: Progressing  Intervention: Promote Wound Healing  Recent Flowsheet Documentation  Taken 4/17/2025 0222 by Jackie Sue, RN  Sleep/Rest Enhancement: awakenings minimized

## 2025-04-17 NOTE — PLAN OF CARE
"Assessments:   A&Ox4, Up SBA with walker and cam boot, WB to heel of LLE, denies pain. VSS, on room air. LLE elevated, cms intact, wound vac in place. Dressing placed over sacrum for protection.  and 150    Treatment Plan: Plan for third debridement and further bone resection at 4/17, NPO at midnight. Diabetes/insulin education    Bedside Nurse: Deven Falcon RN       Problem: Pain Acute  Goal: Optimal Pain Control and Function  Outcome: Progressing  Intervention: Prevent or Manage Pain  Recent Flowsheet Documentation  Taken 4/16/2025 1600 by Deven Falcon RN  Medication Review/Management: medications reviewed     Problem: Adult Inpatient Plan of Care  Goal: Plan of Care Review  Description: The Plan of Care Review/Shift note should be completed every shift.  The Outcome Evaluation is a brief statement about your assessment that the patient is improving, declining, or no change.  This information will be displayed automatically on your shiftnote.  Outcome: Progressing  Flowsheets (Taken 4/16/2025 2300)  Plan of Care Reviewed With: patient  Overall Patient Progress: improving  Goal: Patient-Specific Goal (Individualized)  Description: You can add care plan individualizations to a care plan. Examples of Individualization might be:  \"Parent requests to be called daily at 9am for status\", \"I have a hard time hearing out of my right ear\", or \"Do not touch me to wake me up as it startlesme\".  Outcome: Progressing  Goal: Absence of Hospital-Acquired Illness or Injury  Outcome: Progressing  Intervention: Identify and Manage Fall Risk  Recent Flowsheet Documentation  Taken 4/16/2025 1600 by Deven Falcon RN  Safety Promotion/Fall Prevention:   activity supervised   increased rounding and observation   increase visualization of patient   mobility aid in reach   nonskid shoes/slippers when out of bed  Intervention: Prevent Skin Injury  Recent Flowsheet Documentation  Taken 4/16/2025 1600 by Ezekiel, " Deven SHANKAR RN  Body Position: position changed independently  Intervention: Prevent and Manage VTE (Venous Thromboembolism) Risk  Recent Flowsheet Documentation  Taken 4/16/2025 1600 by Deven Falcon RN  VTE Prevention/Management: SCDs off (sequential compression devices)  Intervention: Prevent Infection  Recent Flowsheet Documentation  Taken 4/16/2025 1600 by Deven Falcon RN  Infection Prevention: rest/sleep promoted  Goal: Optimal Comfort and Wellbeing  Outcome: Progressing  Intervention: Provide Person-Centered Care  Recent Flowsheet Documentation  Taken 4/16/2025 1600 by Deven Falcon RN  Trust Relationship/Rapport:   care explained   choices provided  Goal: Readiness for Transition of Care  Outcome: Progressing     Problem: Diabetes  Goal: Optimal Coping  Outcome: Progressing  Goal: Optimal Functional Ability  Outcome: Progressing  Intervention: Optimize Functional Ability  Recent Flowsheet Documentation  Taken 4/16/2025 1600 by Deven Flacon RN  Activity Management: activity adjusted per tolerance  Goal: Blood Glucose Level Within Target Range  Outcome: Progressing  Goal: Minimize Risk of Hypoglycemia  Outcome: Progressing     Problem: Wound  Goal: Optimal Coping  Outcome: Progressing  Goal: Optimal Functional Ability  Outcome: Progressing  Intervention: Optimize Functional Ability  Recent Flowsheet Documentation  Taken 4/16/2025 1600 by Deven Falcon RN  Activity Management: activity adjusted per tolerance  Goal: Absence of Infection Signs and Symptoms  Outcome: Progressing  Goal: Improved Oral Intake  Outcome: Progressing  Goal: Optimal Pain Control and Function  Outcome: Progressing  Goal: Skin Health and Integrity  Outcome: Progressing  Intervention: Optimize Skin Protection  Recent Flowsheet Documentation  Taken 4/16/2025 1600 by Deven Falcon RN  Activity Management: activity adjusted per tolerance  Head of Bed (HOB) Positioning: HOB at  20-30 degrees  Goal: Optimal Wound Healing  Outcome: Progressing       Goal Outcome Evaluation:      Plan of Care Reviewed With: patient    Overall Patient Progress: improving

## 2025-04-17 NOTE — ANESTHESIA POSTPROCEDURE EVALUATION
Patient: Justino Giordano    Procedure: Procedure(s):  Left foot fifth metatarsal resection. Left foot debridement       Anesthesia Type:  MAC    Note:  Disposition: Inpatient   Postop Pain Control: Uneventful            Sign Out: Well controlled pain   PONV: No   Neuro/Psych: Uneventful            Sign Out: Acceptable/Baseline neuro status   Airway/Respiratory: Uneventful            Sign Out: Acceptable/Baseline resp. status   CV/Hemodynamics: Uneventful            Sign Out: Acceptable CV status; No obvious hypovolemia; No obvious fluid overload   Other NRE: NONE   DID A NON-ROUTINE EVENT OCCUR? No           Last vitals:  Vitals Value Taken Time   /95 04/17/25 1215   Temp 98  F (36.7  C) 04/17/25 1215   Pulse 80 04/17/25 1215   Resp 18 04/17/25 1215   SpO2 95 % 04/17/25 1215       Electronically Signed By: Jamel Montilla MD  April 17, 2025  1:14 PM

## 2025-04-17 NOTE — PLAN OF CARE
"Pertinent assessments: A&O, up sba with walker, L foot heel weight bearing. Denies pain sob or nausea at baseline. ,124 Pt safely demonstrated insulin admin. Resume diet.     Major Shift Events POD 0 L foot debridement. Tolerated procedure well, denies pain post op. Post op episode of a fib, self converted to SR, on tele.     Treatment Plan: Monitor CMS to L foot. Pain management. Cont education regarding DM.     Bedside Nurse: Katiana Stein RN       Problem: Pain Acute  Goal: Optimal Pain Control and Function  Outcome: Progressing  Intervention: Prevent or Manage Pain  Recent Flowsheet Documentation  Taken 4/17/2025 0900 by Katiana Stein RN  Medication Review/Management: medications reviewed     Problem: Adult Inpatient Plan of Care  Goal: Plan of Care Review  Description: The Plan of Care Review/Shift note should be completed every shift.  The Outcome Evaluation is a brief statement about your assessment that the patient is improving, declining, or no change.  This information will be displayed automatically on your shiftnote.  Outcome: Progressing  Flowsheets (Taken 4/17/2025 3326)  Outcome Evaluation: Treatment Plan: Monitor CMS to L foot. Pain management. Cont education regarding DM.  Plan of Care Reviewed With: patient  Overall Patient Progress: no change  Goal: Patient-Specific Goal (Individualized)  Description: You can add care plan individualizations to a care plan. Examples of Individualization might be:  \"Parent requests to be called daily at 9am for status\", \"I have a hard time hearing out of my right ear\", or \"Do not touch me to wake me up as it startlesme\".  Outcome: Progressing  Goal: Absence of Hospital-Acquired Illness or Injury  Outcome: Progressing  Intervention: Identify and Manage Fall Risk  Recent Flowsheet Documentation  Taken 4/17/2025 0900 by Katiana Stein RN  Safety Promotion/Fall Prevention:   activity supervised   clutter free environment maintained   increased " rounding and observation   increase visualization of patient   nonskid shoes/slippers when out of bed  Intervention: Prevent and Manage VTE (Venous Thromboembolism) Risk  Recent Flowsheet Documentation  Taken 4/17/2025 0900 by Katiana Stein RN  VTE Prevention/Management: SCDs off (sequential compression devices)  Goal: Optimal Comfort and Wellbeing  Outcome: Progressing  Goal: Readiness for Transition of Care  Outcome: Progressing     Problem: Diabetes  Goal: Optimal Coping  Outcome: Progressing  Goal: Optimal Functional Ability  Outcome: Progressing  Goal: Blood Glucose Level Within Target Range  Outcome: Progressing  Goal: Minimize Risk of Hypoglycemia  Outcome: Progressing     Problem: Wound  Goal: Optimal Coping  Outcome: Progressing  Goal: Optimal Functional Ability  Outcome: Progressing  Goal: Absence of Infection Signs and Symptoms  Outcome: Progressing  Goal: Improved Oral Intake  Outcome: Progressing  Goal: Optimal Pain Control and Function  Outcome: Progressing  Goal: Skin Health and Integrity  Outcome: Progressing  Goal: Optimal Wound Healing  Outcome: Progressing   Goal Outcome Evaluation:      Plan of Care Reviewed With: patient    Overall Patient Progress: no changeOverall Patient Progress: no change    Outcome Evaluation: Treatment Plan: Monitor CMS to L foot. Pain management. Cont education regarding DM.

## 2025-04-17 NOTE — ANESTHESIA PREPROCEDURE EVALUATION
Anesthesia Pre-Procedure Evaluation    Patient: Justino Giordano   MRN: [id number] : 1960        Procedure : Procedure(s):  Left foot fifth metatarsal resection. Left foot debridement          Past Medical History:   Diagnosis Date    Diabetes (H)     Elevated blood pressure reading without diagnosis of hypertension     Epigastric hernia     Other and unspecified hyperlipidemia       Past Surgical History:   Procedure Laterality Date    AMPUTATE TOE(S) Left 2025    Procedure: Left foot fifth ray resection, left foot excisional debridement down to and including tendon for site measuring 6 cm x 4 cm x 2 cm;  Surgeon: Denisse Preciado DPM;  Location: [facility] OR    COLONOSCOPY  2013    Procedure: COLONOSCOPY;  Colonoscopy ;  Surgeon: Ash Brasher MD;  Location:  GI    HC KNEE SCOPE,REMV LOOSE BODY      right knee, left knee scoped    HERNIORRHAPHY EPIGASTRIC  2012    Procedure: HERNIORRHAPHY EPIGASTRIC;  Epigastric Hernia repair with Mesh ;  Surgeon: Viv Alcantara DO;  Location: [facility] OR    IRRIGATION AND DEBRIDEMENT FOOT, COMBINED Left 2025    Procedure: Left foot fifth metatarsal resection, left foot excisional debridement down to and including tendon, site measuring 7 cm x 4 cm x 2 cm;  Surgeon: Denisse Preciado DPM;  Location: [facility] OR      No Known Allergies   Social History     Tobacco Use    Smoking status: Never    Smokeless tobacco: Never   Substance Use Topics    Alcohol use: No      Wt Readings from Last 1 Encounters:   25 114.2 kg (251 lb 12.3 oz)        Anesthesia Evaluation   Pt has had prior anesthetic. Type: General and MAC.    No history of anesthetic complications       ROS/MED HX  ENT/Pulmonary:  - neg pulmonary ROS     Neurologic:  - neg neurologic ROS     Cardiovascular:     (+) Dyslipidemia hypertension- -   -  - -                                      METS/Exercise Tolerance:     Hematologic:     (+)      anemia,          Musculoskeletal:   (+)  arthritis,    "          GI/Hepatic:  - neg GI/hepatic ROS     Renal/Genitourinary:  - neg Renal ROS     Endo:     (+)  type II DM,             Obesity,       Psychiatric/Substance Use:  - neg psychiatric ROS     Infectious Disease:  - neg infectious disease ROS     Malignancy:       Other:            Physical Exam    Airway        Mallampati: II   TM distance: > 3 FB   Neck ROM: full   Mouth opening: > 3 cm    Respiratory Devices and Support         Dental       (+) Modest Abnormalities - crowns, retainers, 1 or 2 missing teeth      Cardiovascular   cardiovascular exam normal          Pulmonary   pulmonary exam normal                OUTSIDE LABS:  CBC:   Lab Results   Component Value Date    WBC 10.6 04/16/2025    WBC 12.0 (H) 04/12/2025    HGB 12.9 (L) 04/16/2025    HGB 12.9 (L) 04/08/2025    HCT 37.8 (L) 04/16/2025    HCT 36.3 (L) 04/08/2025     (H) 04/16/2025     04/08/2025     BMP:   Lab Results   Component Value Date     (L) 04/14/2025     (L) 04/11/2025    POTASSIUM 4.4 04/17/2025    POTASSIUM 3.3 (L) 04/17/2025    CHLORIDE 97 (L) 04/14/2025    CHLORIDE 99 04/11/2025    CO2 23 04/14/2025    CO2 22 04/11/2025    BUN 6.0 (L) 04/14/2025    BUN 4.5 (L) 04/11/2025    CR 0.76 04/14/2025    CR 0.72 04/13/2025     (H) 04/17/2025     (H) 04/17/2025     COAGS: No results found for: \"PTT\", \"INR\", \"FIBR\"  POC:   Lab Results   Component Value Date     (H) 12/12/2012     HEPATIC:   Lab Results   Component Value Date    ALBUMIN 3.8 (L) 03/10/2014    PROTTOTAL 7.6 03/10/2014    ALT 25 03/10/2014    AST 35 03/10/2014    ALKPHOS 94 03/10/2014    BILITOTAL 0.8 03/10/2014     OTHER:   Lab Results   Component Value Date    LACT 1.9 04/07/2025    A1C 12.3 (H) 04/07/2025    VERNON 8.5 (L) 04/14/2025    PHOS 3.8 04/17/2025    MAG 1.8 04/17/2025    TSH 1.28 11/12/2013    SED 46 (H) 04/07/2025       Anesthesia Plan    ASA Status:  3       Anesthesia Type: MAC.     - Reason for MAC: straight local not " "clinically adequate              Consents    Anesthesia Plan(s) and associated risks, benefits, and realistic alternatives discussed. Questions answered and patient/representative(s) expressed understanding.     - Discussed:     - Discussed with:  Patient      - Extended Intubation/Ventilatory Support Discussed: No.      - Patient is DNR/DNI Status: No     Use of blood products discussed: No .     Postoperative Care    Pain management: IV analgesics, Oral pain medications, Multi-modal analgesia.   PONV prophylaxis: Ondansetron (or other 5HT-3), Dexamethasone or Solumedrol     Comments:               Jamel Montilla MD    Clinically Significant Risk Factors        # Hypokalemia: Lowest K = 3 mmol/L in last 2 days, will replace as needed            # Hypertension: Noted on problem list           # DMII: A1C = 12.3 % (Ref range: <5.7 %) within past 6 months   # Obesity: Estimated body mass index is 31.47 kg/m  as calculated from the following:    Height as of this encounter: 1.905 m (6' 3\").    Weight as of this encounter: 114.2 kg (251 lb 12.3 oz).      # Financial/Environmental Concerns: other (see comments) (pt expressed concerns about being off of work and without pay)           "

## 2025-04-17 NOTE — ANESTHESIA CARE TRANSFER NOTE
Patient: Justino Giordano    Procedure: Procedure(s):  Left foot fifth metatarsal resection. Left foot debridement       Diagnosis: Gas gangrene of foot (H) [A48.0]  Diagnosis Additional Information: No value filed.    Anesthesia Type:   MAC     Note:    Oropharynx: oropharynx clear of all foreign objects and spontaneously breathing  Level of Consciousness: awake  Oxygen Supplementation: face mask  Level of Supplemental Oxygen (L/min / FiO2): 6  Independent Airway: airway patency satisfactory and stable  Dentition: dentition unchanged  Vital Signs Stable: post-procedure vital signs reviewed and stable  Report to RN Given: handoff report given  Patient transferred to: Phase II    Handoff Report: Identifed the Patient, Identified the Reponsible Provider, Reviewed the pertinent medical history, Discussed the surgical course, Reviewed Intra-OP anesthesia mangement and issues during anesthesia, Set expectations for post-procedure period and Allowed opportunity for questions and acknowledgement of understanding      Vitals:  Vitals Value Taken Time   BP     Temp     Pulse     Resp     SpO2         Electronically Signed By: MELY Morris CRNA  April 17, 2025  10:48 AM

## 2025-04-17 NOTE — BRIEF OP NOTE
[facility]    Brief Operative Note    Pre-operative diagnosis: Gas gangrene of foot (H) [A48.0]  Diabetes Type 2    Post-operative diagnosis Same as pre-operative diagnosis    Procedure: Left foot fifth metatarsal resection. Left foot debridement, Left - Toe    Surgeon: Surgeons and Role:     * Bria Pagan DPM, Podiatry/Foot and Ankle Surgery - Primary  Anesthesia: MAC   Estimated Blood Loss: 10 mL from 4/17/2025 10:08 AM to 4/17/2025 10:44 AM      Drains: None  Specimens:   ID Type Source Tests Collected by Time Destination   1 : Left foot 5th metatarsal Tissue Foot, Left SURGICAL PATHOLOGY EXAM Bria Pagan DPM, Podiatry/Foot and Ankle Surgery 4/17/2025 10:28 AM      Findings:   None.  Complications: None.  Implants: * No implants in log *

## 2025-04-17 NOTE — PROGRESS NOTES
[facility]    Medicine Progress Note - Hospitalist Service    Date of Admission:  4/7/2025    Assessment & Plan   Justino Giordano is a 65 year old male with a history of HTN, DM Type 2, HLD, Obesity who presents to the ED today with a left foot injury.        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  He has not been taking medications nor caring for his diabetes. .  He was afebrile, hypertensive, HR 90, RR 20.  WBC 16.4,  with negative lactic acid.  Left Foot Xray revealed extensive soft tissue gas within the dorsal forefoot along the second through fifth metacarpal heads/necks and surrounding the fifth MTP joint, highly concerning for necrotizing soft tissue infection. There is some localized osteopenia at the medial base of the fifth proximal phalanx which may be secondary to superimposition of the soft tissue gas. No discrete osseous destructive change. Minimal scattered degenerative arthritis.  He was placed on IV fluids and made NPO.  He was empirically placed on Vancomycin, Zosyn, Clindamycin. Wound Culture with gram stain with a  polymicrobial infection with culture showing laura and fusobacterium on admission and from surgery.  Will discontinue zosyn/vanco and continue on IV clindamycin, transition to oral when ok with podiatry, awaiting pathology to see if he requires further surgery/debridement.     Podiatry consulted and patient taken on 4/8/25 for left 5th ray resection with debridement.    Only to be weight bearing to heal on the left lower extremity, Cam boot per podiatry.  Patient works as a  for [employer]. Patient had more debridement per podiatry on 4/11, plan to go home with a wound vac, social work working on.  Wound care per podiatry .  Pain well controlled, likely with severe neuropathy. Physical therapy to see to make sure he will be safe for home. Recommend outpatient physical therapy.  Patient had a normal ARASELI US of the lower extremity.      Noncompliance  Patient has not been taking any medications nor caring for his diabetes, hypertension.  This puts him at risk for readmissions, morbidity, and early mortality. We discussed the importance of taking care of his medical issues at length. Need to make medications and care as easy as possible for him so that he will be compliant.       Untreated and Uncontrolled type 2 diabetes, will be insulin dependent with peripheral neuropathy  Hgb A1C 11.7 (4/2021) and was 6.6 in (2013).  BS on admission 282 with hemoglobin A1c up to 12.3.  Bicarb 20, AG 19.  Not technically DKA by criteria.  Patient denies he has a history of diabetes, although reports he has had high blood sugars in the past.  No longer takes Metformin and never started Lantus as recommended in 2021. Given the elevated A1c above 10 will need insulin.  Was started on lantus and an insulin correction scale.  Needs close primary care provider follow up with diabetic education.   Blood sugar 200s, continue on lantus and increase his prandial dosing.  I doubt with his compliance issues that he can do carb counting.     Needs ongoing insulin teaching per nursing     Hypertension, hyperlipidemia  -Previously on Atorvastatin, no longer takes this.  BP elevated 150-170s on arrival. , HDL 41, .  Will start on lipitor.   BP currently  138-150s/80s  - monitor bp trend,  - Ace inhibitor added 4/9. Added hydrochlorothiazide. 4/13, will need to  follow his BMP and BP in a few weeks with a primary care provider which he needs to establish     Hyponatremia, Hypochloremia  Sodium when corrected for glucose is 131, chloride 88.  - IVF hydration now off of IV fluids   -repeat BMP in am showing sodium stable 131. Creatinine normal     PAF episode  "in PACU.  Telemetry  Obtain echocardiogram tomorrow.        Diet: Advance Diet as Tolerated: Fully Advanced to diet(s) per Provider order; Moderate Consistent Carb (60 g CHO per Meal) Diet    DVT Prophylaxis: Pneumatic Compression Devices  Nuñez Catheter: Not present  Lines: None     Cardiac Monitoring: ACTIVE order. Indication: Tachyarrhythmias, acute (48 hours)  Code Status: Full Code      Clinically Significant Risk Factors        # Hypokalemia: Lowest K = 3 mmol/L in last 2 days, will replace as needed            # Hypertension: Noted on problem list           # DMII: A1C = 12.3 % (Ref range: <5.7 %) within past 6 months   # Obesity: Estimated body mass index is 31.47 kg/m  as calculated from the following:    Height as of this encounter: 1.905 m (6' 3\").    Weight as of this encounter: 114.2 kg (251 lb 12.3 oz).        # Financial/Environmental Concerns: other (see comments) (pt expressed concerns about being off of work and without pay)         Social Drivers of Health            Disposition Plan     Medically Ready for Discharge: Anticipated in 2-4 Days         Marcial Fried MD  Hospitalist Service  [facility]  Securely message with [employer] (more info)  Text page via [employer] Paging/Directory   ______________________________________________________________________    Interval History   Asymptomatic, taken by DPM to OR for more debridement this AM. In PACU patient had episode of PAF self-converted apparently to sinus based on anesthesia report; transitioned to MS harris. Telemetry requested.     Physical Exam   Vital Signs: Temp: 98.1  F (36.7  C) Temp src: Oral BP: 104/72 Pulse: 78   Resp: 20 SpO2: 98 % O2 Device: None (Room air) Oxygen Delivery: 6 LPM  Weight: 251 lbs 12.25 oz    Constitutional: awake, alert, cooperative, no apparent distress, and appears stated age  Respiratory: No increased work of breathing, good air exchange, clear to auscultation bilaterally, no crackles or " wheezing  Cardiovascular: Normal apical impulse, regular rate and rhythm, normal S1 and S2, no S3 or S4, and no murmur noted    Medical Decision Making       50 MINUTES SPENT BY ME on the date of service doing chart review, history, exam, documentation & further activities per the note.      Data     I have personally reviewed the following data over the past 24 hrs:    10.6  \   12.9 (L)   / 519 (H)     N/A N/A N/A /  124 (H)   4.4 N/A N/A \     Procal: N/A CRP: 63.85 (H) Lactic Acid: N/A         Imaging results reviewed over the past 24 hrs:   Recent Results (from the past 24 hours)   X-ray lt Foot 3 vw port: In PACU    Narrative    EXAM: XR FOOT PORT LEFT 3 VIEWS  LOCATION: [facility]  DATE: 4/17/2025    INDICATION: post op  COMPARISON: 04/08/2025      Impression    IMPRESSION: Interval, now complete amputation of the fifth metatarsal. Postoperative soft tissue gas about the left lateral foot. No acute fracture. Mild scattered degenerative arthritis. Plantar calcaneal spur. Arterial calcifications.

## 2025-04-17 NOTE — PROGRESS NOTES
Care Management Follow Up    Length of Stay (days): 10    Expected Discharge Date: 04/19/2025     Concerns to be Addressed: discharge planning     Patient plan of care discussed at interdisciplinary rounds: Yes    Anticipated Discharge Disposition: Home Care    Anticipated Discharge Services: Home Care  Anticipated Discharge DME: Wound Vac      Education Provided on the Discharge Plan:  yes  Patient/Family in Agreement with the Plan:  yes    Referrals Placed by CM/SW: Homecare  Private pay costs discussed: Not applicable    Discussed  Partnership in Safe Discharge Planning  document with patient/family: No     Handoff Completed: No, handoff not indicated or clinically appropriate    Additional Information:  Patient not ready for discharge yet, back to the OR today with Podiatry. Updated [facility] on change in discharge plan. Will need to update them tomorrow with a potential new discharge date for scheduling. They do not take calls on the weekend so if a potential SOC on Monday they would like to know tomorrow.    Carey Gu RN BSN OCN  Care Coordinator  [facility]  [phone]

## 2025-04-18 LAB
ANION GAP SERPL CALCULATED.3IONS-SCNC: 11 MMOL/L (ref 7–15)
BASOPHILS # BLD AUTO: 0.1 10E3/UL (ref 0–0.2)
BASOPHILS NFR BLD AUTO: 1 %
BUN SERPL-MCNC: 16.2 MG/DL (ref 8–23)
CALCIUM SERPL-MCNC: 8.5 MG/DL (ref 8.8–10.4)
CHLORIDE SERPL-SCNC: 97 MMOL/L (ref 98–107)
CREAT SERPL-MCNC: 1.05 MG/DL (ref 0.67–1.17)
EGFRCR SERPLBLD CKD-EPI 2021: 79 ML/MIN/1.73M2
EOSINOPHIL # BLD AUTO: 0.2 10E3/UL (ref 0–0.7)
EOSINOPHIL NFR BLD AUTO: 2 %
ERYTHROCYTE [DISTWIDTH] IN BLOOD BY AUTOMATED COUNT: 11.7 % (ref 10–15)
GLUCOSE BLDC GLUCOMTR-MCNC: 107 MG/DL (ref 70–99)
GLUCOSE BLDC GLUCOMTR-MCNC: 118 MG/DL (ref 70–99)
GLUCOSE BLDC GLUCOMTR-MCNC: 118 MG/DL (ref 70–99)
GLUCOSE BLDC GLUCOMTR-MCNC: 144 MG/DL (ref 70–99)
GLUCOSE BLDC GLUCOMTR-MCNC: 165 MG/DL (ref 70–99)
GLUCOSE SERPL-MCNC: 145 MG/DL (ref 70–99)
HCO3 SERPL-SCNC: 22 MMOL/L (ref 22–29)
HCT VFR BLD AUTO: 34.9 % (ref 40–53)
HGB BLD-MCNC: 12.4 G/DL (ref 13.3–17.7)
IMM GRANULOCYTES # BLD: 0.1 10E3/UL
IMM GRANULOCYTES NFR BLD: 1 %
LYMPHOCYTES # BLD AUTO: 1.7 10E3/UL (ref 0.8–5.3)
LYMPHOCYTES NFR BLD AUTO: 16 %
MAGNESIUM SERPL-MCNC: 1.9 MG/DL (ref 1.7–2.3)
MCH RBC QN AUTO: 30.2 PG (ref 26.5–33)
MCHC RBC AUTO-ENTMCNC: 35.5 G/DL (ref 31.5–36.5)
MCV RBC AUTO: 85 FL (ref 78–100)
MONOCYTES # BLD AUTO: 1 10E3/UL (ref 0–1.3)
MONOCYTES NFR BLD AUTO: 10 %
NEUTROPHILS # BLD AUTO: 7.2 10E3/UL (ref 1.6–8.3)
NEUTROPHILS NFR BLD AUTO: 71 %
NRBC # BLD AUTO: 0 10E3/UL
NRBC BLD AUTO-RTO: 0 /100
PHOSPHATE SERPL-MCNC: 4.1 MG/DL (ref 2.5–4.5)
PLATELET # BLD AUTO: 475 10E3/UL (ref 150–450)
POTASSIUM SERPL-SCNC: 4 MMOL/L (ref 3.4–5.3)
RBC # BLD AUTO: 4.11 10E6/UL (ref 4.4–5.9)
SODIUM SERPL-SCNC: 130 MMOL/L (ref 135–145)
WBC # BLD AUTO: 10.2 10E3/UL (ref 4–11)

## 2025-04-18 PROCEDURE — G0463 HOSPITAL OUTPT CLINIC VISIT: HCPCS

## 2025-04-18 PROCEDURE — 250N000013 HC RX MED GY IP 250 OP 250 PS 637: Performed by: INTERNAL MEDICINE

## 2025-04-18 PROCEDURE — 99233 SBSQ HOSP IP/OBS HIGH 50: CPT | Performed by: HOSPITALIST

## 2025-04-18 PROCEDURE — 82310 ASSAY OF CALCIUM: CPT | Performed by: HOSPITALIST

## 2025-04-18 PROCEDURE — 36415 COLL VENOUS BLD VENIPUNCTURE: CPT | Performed by: HOSPITALIST

## 2025-04-18 PROCEDURE — 250N000011 HC RX IP 250 OP 636: Mod: JZ | Performed by: PHYSICIAN ASSISTANT

## 2025-04-18 PROCEDURE — 120N000001 HC R&B MED SURG/OB

## 2025-04-18 PROCEDURE — 84100 ASSAY OF PHOSPHORUS: CPT | Performed by: HOSPITALIST

## 2025-04-18 PROCEDURE — 250N000013 HC RX MED GY IP 250 OP 250 PS 637: Performed by: HOSPITALIST

## 2025-04-18 PROCEDURE — 97606 NEG PRS WND THER DME>50 SQCM: CPT

## 2025-04-18 PROCEDURE — 83735 ASSAY OF MAGNESIUM: CPT | Performed by: HOSPITALIST

## 2025-04-18 PROCEDURE — 250N000013 HC RX MED GY IP 250 OP 250 PS 637: Performed by: PODIATRIST

## 2025-04-18 PROCEDURE — 85025 COMPLETE CBC W/AUTO DIFF WBC: CPT | Performed by: HOSPITALIST

## 2025-04-18 RX ORDER — METOPROLOL SUCCINATE 25 MG/1
25 TABLET, EXTENDED RELEASE ORAL DAILY
Status: DISCONTINUED | OUTPATIENT
Start: 2025-04-18 | End: 2025-04-19 | Stop reason: HOSPADM

## 2025-04-18 RX ADMIN — ATORVASTATIN CALCIUM 40 MG: 40 TABLET, FILM COATED ORAL at 20:17

## 2025-04-18 RX ADMIN — HYDROCHLOROTHIAZIDE 25 MG: 25 TABLET ORAL at 09:03

## 2025-04-18 RX ADMIN — ACETAMINOPHEN 975 MG: 325 TABLET, FILM COATED ORAL at 03:58

## 2025-04-18 RX ADMIN — CLINDAMYCIN PHOSPHATE 900 MG: 900 INJECTION, SOLUTION INTRAVENOUS at 04:01

## 2025-04-18 RX ADMIN — LISINOPRIL 20 MG: 20 TABLET ORAL at 09:03

## 2025-04-18 RX ADMIN — CLINDAMYCIN PHOSPHATE 900 MG: 900 INJECTION, SOLUTION INTRAVENOUS at 13:32

## 2025-04-18 RX ADMIN — CLINDAMYCIN PHOSPHATE 900 MG: 900 INJECTION, SOLUTION INTRAVENOUS at 20:17

## 2025-04-18 ASSESSMENT — ACTIVITIES OF DAILY LIVING (ADL)
ADLS_ACUITY_SCORE: 37

## 2025-04-18 NOTE — PROGRESS NOTES
[facility]    Medicine Progress Note - Hospitalist Service    Date of Admission:  4/7/2025    Assessment & Plan   Justino Giordano is a 65 year old male with a history of HTN, DM Type 2, HLD, Obesity who presents to the ED today with a left foot injury.        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  He has not been taking medications nor caring for his diabetes. .  He was afebrile, hypertensive, HR 90, RR 20.  WBC 16.4,  with negative lactic acid.  Left Foot Xray revealed extensive soft tissue gas within the dorsal forefoot along the second through fifth metacarpal heads/necks and surrounding the fifth MTP joint, highly concerning for necrotizing soft tissue infection. There is some localized osteopenia at the medial base of the fifth proximal phalanx which may be secondary to superimposition of the soft tissue gas. No discrete osseous destructive change. Minimal scattered degenerative arthritis.  He was placed on IV fluids and made NPO.  He was empirically placed on Vancomycin, Zosyn, Clindamycin. Wound Culture with gram stain with a  polymicrobial infection with culture showing laura and fusobacterium on admission and from surgery.  Will discontinue zosyn/vanco and continue on IV clindamycin, transition to oral when ok with podiatry,   Podiatry consulted and patient taken on 4/8/25 for left 5th ray resection with debridement.    Only to be weight bearing to heal on the left lower extremity, Cam boot per podiatry.  Patient works as a  for [employer]. Patient had more debridement per podiatry on 4/11, plan to go home with a wound vac, social work working on.  Wound care per podiatry . Pain well controlled, likely with severe neuropathy. Physical therapy to  see to make sure he will be safe for home. Recommend outpatient physical therapy.  Patient had a normal ARASELI US of the lower extremity.    Pathology results prompted further surgery/debridement 4/17.         Noncompliance  Patient has not been taking any medications nor caring for his diabetes, hypertension.  This puts him at risk for readmissions, morbidity, and early mortality. We discussed the importance of taking care of his medical issues at length. Need to make medications and care as easy as possible for him so that he will be compliant.       Untreated and Uncontrolled type 2 diabetes, will be insulin dependent with peripheral neuropathy  Hgb A1C 11.7 (4/2021) and was 6.6 in (2013).  BS on admission 282 with hemoglobin A1c up to 12.3.  Bicarb 20, AG 19.  Not technically DKA by criteria.  Patient denies he has a history of diabetes, although reports he has had high blood sugars in the past.  No longer takes Metformin and never started Lantus as recommended in 2021. Given the elevated A1c above 10 will need insulin.  Was started on lantus and an insulin correction scale.  Needs close primary care provider follow up with diabetic education.   Blood sugar 200s, continue on lantus and increase his prandial dosing.  I doubt with his compliance issues that he can do carb counting.     Needs ongoing insulin teaching per nursing     Hypertension, hyperlipidemia  -Previously on Atorvastatin, no longer takes this.  BP elevated 150-170s on arrival. , HDL 41, .  Will start on lipitor.   BP currently  138-150s/80s  - monitor bp trend,  - Ace inhibitor added 4/9. Added hydrochlorothiazide. 4/13, will need to  follow his BMP and BP in a few weeks with a primary care provider which he needs to establish     Hyponatremia, Hypochloremia  Sodium when corrected for glucose is 131, chloride 88.  - IVF hydration now off of IV fluids   -repeat BMP in am showing sodium stable 131. Creatinine normal     Tachy arrhythmia  "episode in PACU.  Telemetry  Atrial flutter, converted to NSR 4/17 6PM  [id number] equals 3  Start: Metoprolol, DOAC after approval by surgeon and send home with Zio patch.  Pharmacy liaison consulted   Cardiology  outpatient consult, kell to arrange      Echocardiogram 4/17.   Interpretation Summary     The visual ejection fraction is 50-55%.  The rhythm was atrial flutter.  The left atrium is borderline dilated.  Ascending aorta dilatation is present.    Needs outpatient Cardio follow up      Diet: Moderate Consistent Carb (60 g CHO per Meal) Diet    DVT Prophylaxis: Pneumatic Compression Devices  Nuñez Catheter: Not present  Lines: None     Cardiac Monitoring: ACTIVE order. Indication: Tachyarrhythmias, acute (48 hours)  Code Status: Full Code      Clinically Significant Risk Factors        # Hypokalemia: Lowest K = 3 mmol/L in last 2 days, will replace as needed  # Hyponatremia: Lowest Na = 130 mmol/L in last 2 days, will monitor as appropriate  # Hypochloremia: Lowest Cl = 97 mmol/L in last 2 days, will monitor as appropriate          # Hypertension: Noted on problem list           # DMII: A1C = 12.3 % (Ref range: <5.7 %) within past 6 months   # Obesity: Estimated body mass index is 31.47 kg/m  as calculated from the following:    Height as of this encounter: 1.905 m (6' 3\").    Weight as of this encounter: 114.2 kg (251 lb 12.3 oz).        # Financial/Environmental Concerns: other (see comments) (pt expressed concerns about being off of work and without pay)         Social Drivers of Health            Disposition Plan     Medically Ready for Discharge: Anticipated in 2-4 Days         Marcial Fried MD  Hospitalist Service  [facility]  Securely message with [employer] (more info)  Text page via [employer] Paging/Directory   ______________________________________________________________________    Interval History   Asymptomatic. In PACU patient had episode of PAF self-converted " apparently to sinus based on anesthesia report; transitioned to MS harris. Echo done. Discussed with Cardiology (Dr Redmond). Patient agrees with AC. Rate control. Outpatient follow up.     Physical Exam   Vital Signs: Temp: 97.6  F (36.4  C) Temp src: Oral BP: 124/90 Pulse: 66   Resp: 22 SpO2: 98 % O2 Device: None (Room air)    Weight: 251 lbs 12.25 oz    Constitutional: awake, alert, cooperative, no apparent distress, and appears stated age  Respiratory: No increased work of breathing, good air exchange, clear to auscultation bilaterally, no crackles or wheezing  Cardiovascular: Normal apical impulse, regular rate and rhythm, normal S1 and S2, no S3 or S4, and no murmur noted    Medical Decision Making       50 MINUTES SPENT BY ME on the date of service doing chart review, history, exam, documentation & further activities per the note.      Data     I have personally reviewed the following data over the past 24 hrs:    10.2  \   12.4 (L)   / 475 (H)     130 (L) 97 (L) 16.2 /  144 (H)   4.0 22 1.05 \       Imaging results reviewed over the past 24 hrs:   Recent Results (from the past 24 hours)   Echocardiogram Complete   Result Value    LVEF  50-55%    Narrative    [id number]  [id number]  [id number]  [id number]^ELVER^CIERA^SACHIN     [facility]  Echocardiography Laboratory  [address] [place] [address]     Name: RUSS ONEILL  MRN: [id number]  : 1960  Study Date: 2025 03:02 PM  Age: 65 yrs  Gender: Male  Patient Location: [facility]  Reason For Study: Aflutter  Ordering Physician: CIERA RAYA  Performed By: Val Gracia     BSA: 2.4 m2  Height: 75 in  Weight: 251 lb  HR: 95  ______________________________________________________________________________  Procedure  Echocardiogram with two-dimensional, color and spectral Doppler.  ______________________________________________________________________________  Interpretation Summary     The visual ejection fraction is 50-55%.  The rhythm  was atrial flutter.  The left atrium is borderline dilated.  Ascending aorta dilatation is present.  ______________________________________________________________________________  Left Ventricle  The left ventricle is normal in size. There is normal left ventricular wall  thickness. The visual ejection fraction is 50-55%.     Atria  The left atrium is borderline dilated. Right atrial size is normal.     Aortic Valve  There is mild trileaflet aortic sclerosis.     Pulmonic Valve  Normal pulmonic valve.     Vessels  The aortic root is normal size. Ascending aorta dilatation is present. The  inferior vena cava is normal.     Pericardium  There is no pericardial effusion.     Rhythm  The rhythm was atrial flutter.     ______________________________________________________________________________  MMode/2D Measurements & Calculations  IVSd: 1.0 cm  LVIDd: 5.1 cm  LVIDs: 3.6 cm  LVPWd: 0.90 cm  IVC diam: 1.0 cm  FS: 31.0 %  LV mass(C)d: 180.6 grams  LV mass(C)dI: 74.7 grams/m2  Ao root diam: 3.5 cm  asc Aorta Diam: 4.0 cm  LVOT diam: 2.4 cm  LVOT area: 4.4 cm2  Ao root diam index Ht(cm/m): 1.8     Ao root diam index BSA (cm/m2): 1.4  Asc Ao diam index BSA (cm/m2): 1.6  Asc Ao diam index Ht(cm/m): 2.1  LA Volume (BP): 65.2 ml  LA Volume Index (BP): 26.9 ml/m2  RV Base: 3.6 cm  RWT: 0.35  TAPSE: 1.7 cm     Doppler Measurements & Calculations  MV E max kervin: 78.4 cm/sec  MV dec time: 0.13 sec  PA acc time: 0.10 sec  TR max kervin: 161.0 cm/sec  TR max PG: 10.4 mmHg  E/E' av.1  Lateral E/e': 6.5  Medial E/e': 7.7  RV S Kervin: 11.7 cm/sec     ______________________________________________________________________________  Report approved by: Bismark Reyes MD on 2025 03:59 PM

## 2025-04-18 NOTE — PROGRESS NOTES
Care Management Follow Up    Length of Stay (days): 11    Expected Discharge Date: 04/18/2025     Concerns to be Addressed: discharge planning     Patient plan of care discussed at interdisciplinary rounds: Yes    Anticipated Discharge Disposition: Home Care      Anticipated Discharge Services: Home Care  Anticipated Discharge DME: Wound Vac    Education Provided on the Discharge Plan: yes  Patient/Family in Agreement with the Plan:  yes    Referrals Placed by CM/SW: Homecare  Private pay costs discussed: Not applicable    Discussed  Partnership in Safe Discharge Planning  document with patient/family: No     Handoff Completed: Yes, [facility] PCP: Internal handoff referral completed    Additional Information:  Anticipate discharge home tomorrow after discussion with MD.   [facility] accepted for home RN wound vac changes. Contacted them to inform them of discharge plan. They will be unavailable to review discharge paperwork tomorrow and will review on Monday and see patient at 1200 on Tuesday 4/22. Discussed the delay of one day for vac change with [facility] and they confirmed this would be ok. Will fax discharge orders tomorrow.  PCP appointment set up for Monday 4/21. AVS and patient updated.   Wound Vac approved for home and will be released and placed tomorrow prior to discharge.  Supplied with new glucometer and supplies. Bedside RN has been working on education with this and insulin.   Patient feeling good about going home despite all the new things he will have. Upbeat attitude and hoping for home tomorrow.    Carey Gu RN BSN OCN  Care Coordinator  [facility]  [phone]

## 2025-04-18 NOTE — PLAN OF CARE
"To Do:  End of Shift Summary  For vital signs and complete assessments, please see documentation flowsheets.     Pertinent assessments: A&Ox4. VSS on RA & on capno. Up standby assist to bathroom. L foot heel weight bearing. Denies pain, nausea, sob. On tele, afib with CVR. PIV SL. Mod carb diet, tolerating well. ACHS, blood sugars 111 & 151. Call light within reach and able to make needs known.     Major Shift Events POD 0 L foot debridement    Treatment Plan: Monitor CMS to L foot. Pain management. Cont education regarding DM.     Bedside Nurse: Maci Seymour RN                                                 Goal Outcome Evaluation:         Problem: Pain Acute  Goal: Optimal Pain Control and Function  Outcome: Progressing  Intervention: Prevent or Manage Pain  Recent Flowsheet Documentation  Taken 4/17/2025 1632 by Maci Seymour RN  Medication Review/Management: medications reviewed     Problem: Adult Inpatient Plan of Care  Goal: Plan of Care Review  Description: The Plan of Care Review/Shift note should be completed every shift.  The Outcome Evaluation is a brief statement about your assessment that the patient is improving, declining, or no change.  This information will be displayed automatically on your shiftnote.  Outcome: Progressing  Goal: Patient-Specific Goal (Individualized)  Description: You can add care plan individualizations to a care plan. Examples of Individualization might be:  \"Parent requests to be called daily at 9am for status\", \"I have a hard time hearing out of my right ear\", or \"Do not touch me to wake me up as it startlesme\".  Outcome: Progressing  Goal: Absence of Hospital-Acquired Illness or Injury  Outcome: Progressing  Intervention: Identify and Manage Fall Risk  Recent Flowsheet Documentation  Taken 4/17/2025 1632 by Maci Seymour RN  Safety Promotion/Fall Prevention:   assistive device/personal items within reach   activity supervised   clutter free " environment maintained   nonskid shoes/slippers when out of bed   safety round/check completed  Intervention: Prevent Skin Injury  Recent Flowsheet Documentation  Taken 4/17/2025 1632 by Maci Seymour RN  Body Position: position changed independently  Intervention: Prevent Infection  Recent Flowsheet Documentation  Taken 4/17/2025 1632 by Maci Seymour RN  Infection Prevention:   rest/sleep promoted   single patient room provided  Goal: Optimal Comfort and Wellbeing  Outcome: Progressing  Intervention: Provide Person-Centered Care  Recent Flowsheet Documentation  Taken 4/17/2025 1632 by Maci Seymour RN  Trust Relationship/Rapport:   care explained   choices provided   questions answered  Goal: Readiness for Transition of Care  Outcome: Progressing     Problem: Diabetes  Goal: Optimal Coping  Outcome: Progressing  Goal: Optimal Functional Ability  Outcome: Progressing  Intervention: Optimize Functional Ability  Recent Flowsheet Documentation  Taken 4/17/2025 1632 by Maci Seymour RN  Activity Assistance Provided: assistance, stand-by  Goal: Blood Glucose Level Within Target Range  Outcome: Progressing  Goal: Minimize Risk of Hypoglycemia  Outcome: Progressing     Problem: Wound  Goal: Optimal Coping  Outcome: Progressing  Goal: Optimal Functional Ability  Outcome: Progressing  Intervention: Optimize Functional Ability  Recent Flowsheet Documentation  Taken 4/17/2025 1632 by Maci Seymour RN  Activity Assistance Provided: assistance, stand-by  Goal: Absence of Infection Signs and Symptoms  Outcome: Progressing  Goal: Improved Oral Intake  Outcome: Progressing  Goal: Optimal Pain Control and Function  Outcome: Progressing  Goal: Skin Health and Integrity  Outcome: Progressing  Intervention: Optimize Skin Protection  Recent Flowsheet Documentation  Taken 4/17/2025 1632 by Maci Seymour RN  Head of Bed (HOB) Positioning: HOB at 20-30 degrees  Goal: Optimal Wound  Healing  Outcome: Progressing

## 2025-04-18 NOTE — PROGRESS NOTES
"Podiatry / Foot and Ankle Surgery Progress Note    April 18, 2025    Subject: Patient was seen at bedside for his left foot. Has undergone three procedures for gas gangrene. Wound vac applied today by wound care nursing functioning without leaks. He denies any pain to his foot.     Objective:  Vitals: /90 (BP Location: Left arm)   Pulse 66   Temp 97.6  F (36.4  C) (Oral)   Resp 22   Ht 1.905 m (6' 3\")   Wt 114.2 kg (251 lb 12.3 oz)   SpO2 98%   BMI 31.47 kg/m    BMI= Body mass index is 31.47 kg/m .    WBC Count   Date Value Ref Range Status   04/18/2025 10.2 4.0 - 11.0 10e3/uL Final     Hemaglobin:12.9 (4/8/2025)    A1C: 12.3 (4/7/2025)    General:  Patient is alert and orientated.  NAD.    Vascular:  DP and PT pulses are faintly palpable.  No varicosities noted  CFT's < 3secs.  Skin temp is normal.     Neuro:  Light and gross touch sensation is absent to feet.     Derm:  wound vac left foot intact, functioning without leaks at 125mmgHg continuous     Musculoskeletal: left 5th ray amputated.     Cultures:  1+ Fusobacterium species Abnormal      Pathology (4/8/2025):     Final Diagnosis   Toe, left, fifth ray, resection-  Epidermal ulceration with associated acute osteomyelitis (see description)  Resection margin free of osteomyelitis  No evidence of malignancy     Pathology 4/11  Final Diagnosis   Bone, left foot, fifth metatarsal, biopsy-  Microscopic foci compatible with acute osteomyelitis     Assessment: 65-year-old uncontrolled diabetic male status post left foot fifth ray resection (4/8) with second debridement and further debridement on 4/11 followed by 4/17 debridement.     Plan:    -Discussed all findings with patient. Chart and imaging reviewed.     - Surgical pathology is still pending.     -Stable for discharge with wound vac and home care to change every-other-day.       Jazzy Emmanuel DPM                  "

## 2025-04-18 NOTE — PLAN OF CARE
"Pertinent assessments: A&O, up sba walker. VSS, Denies pain sob or nausea. Eating well, voiding without difficulty. Elevating L extremity in bed, using CAM boot OOB. Tele SR.    Major Shift Events: Cont diabetic education. Pt self administered insulin injections and checked BS using personal glucometer at bedside.  and 163    Treatment Plan: Cont diabetic education, Wound vac. HC at discharge. May need anticoagulant at discharge.    Bedside Nurse: Katiana Stein RN     Problem: Pain Acute  Goal: Optimal Pain Control and Function  Outcome: Progressing  Intervention: Prevent or Manage Pain  Recent Flowsheet Documentation  Taken 4/18/2025 1100 by Katiana Stein RN  Medication Review/Management: medications reviewed     Problem: Adult Inpatient Plan of Care  Goal: Plan of Care Review  Description: The Plan of Care Review/Shift note should be completed every shift.  The Outcome Evaluation is a brief statement about your assessment that the patient is improving, declining, or no change.  This information will be displayed automatically on your shiftnote.  Outcome: Progressing  Flowsheets (Taken 4/18/2025 4715)  Outcome Evaluation: Treatment Plan: Cont diabetic education, Wound vac. HC at discharge. May need anticoagulant at discharge.  Plan of Care Reviewed With: patient  Overall Patient Progress: improving  Goal: Patient-Specific Goal (Individualized)  Description: You can add care plan individualizations to a care plan. Examples of Individualization might be:  \"Parent requests to be called daily at 9am for status\", \"I have a hard time hearing out of my right ear\", or \"Do not touch me to wake me up as it startlesme\".  Outcome: Progressing  Goal: Absence of Hospital-Acquired Illness or Injury  Outcome: Progressing  Intervention: Identify and Manage Fall Risk  Recent Flowsheet Documentation  Taken 4/18/2025 1100 by Katiana Stein RN  Safety Promotion/Fall Prevention:   activity supervised   clutter free " environment maintained   increased rounding and observation   increase visualization of patient  Intervention: Prevent and Manage VTE (Venous Thromboembolism) Risk  Recent Flowsheet Documentation  Taken 4/18/2025 1100 by Katiana Stein RN  VTE Prevention/Management: SCDs off (sequential compression devices)  Goal: Optimal Comfort and Wellbeing  Outcome: Progressing  Goal: Readiness for Transition of Care  Outcome: Progressing     Problem: Diabetes  Goal: Optimal Coping  Outcome: Progressing  Goal: Optimal Functional Ability  Outcome: Progressing  Intervention: Optimize Functional Ability  Recent Flowsheet Documentation  Taken 4/18/2025 1324 by Katiana Stein RN  Assistive Device Utilized: walker  Activity Management: ambulated to bathroom  Activity Assistance Provided: assistance, stand-by  Goal: Blood Glucose Level Within Target Range  Outcome: Progressing  Goal: Minimize Risk of Hypoglycemia  Outcome: Progressing     Problem: Wound  Goal: Optimal Coping  Outcome: Progressing  Goal: Optimal Functional Ability  Outcome: Progressing  Intervention: Optimize Functional Ability  Recent Flowsheet Documentation  Taken 4/18/2025 1324 by Katiana Stein RN  Assistive Device Utilized: walker  Activity Management: ambulated to bathroom  Activity Assistance Provided: assistance, stand-by  Goal: Absence of Infection Signs and Symptoms  Outcome: Progressing  Goal: Improved Oral Intake  Outcome: Progressing  Goal: Optimal Pain Control and Function  Outcome: Progressing  Goal: Skin Health and Integrity  Outcome: Progressing  Intervention: Optimize Skin Protection  Recent Flowsheet Documentation  Taken 4/18/2025 1324 by Katiana Stein RN  Activity Management: ambulated to bathroom  Goal: Optimal Wound Healing  Outcome: Progressing   Goal Outcome Evaluation:      Plan of Care Reviewed With: patient    Overall Patient Progress: improvingOverall Patient Progress: improving    Outcome Evaluation: Treatment Plan: Cont  diabetic education, Wound vac. HC at discharge. May need anticoagulant at discharge.

## 2025-04-18 NOTE — CONSULTS
Patient has Medicare D through [employer].    Eliquis/Xarelto  --Upon receipt of RX, Discharge Pharmacy can provide 1 mo free using one-time  voucher.  --Patient is will pay 100% of the first $590 in drug costs ($590 remains; first month will be as much as $575)  --Subsequent fills will be $143/mo.    Dabigatran  --Patient is will pay 100% of the first $590 in drug costs (fills will be $108/mo until fulfilled)  --Subsequent fills will be $54/mo.    If/when total out of pocket drug costs exceed $2000, patient will pay $0 for all covered drugs for the remainder of the year.    If patient cannot afford deductible, I recommend patient consider participating in the Medicare Prescription Payment Plan, which would spread drug costs more evenly throughout the year.  Patient may enroll in this program now, or at any time during 2025, by calling the member services phone number for their plan.      Catherine Franklin  Pharmacy Technician/Liaison, Discharge Pharmacy   [phone] (voice or text)  [email or web address]  Pharmacy test claims are estimates and may not reflect final costs.  Suggested alternatives aim to be cost-effective and may not be therapeutically equivalent as this consult is informational and does not constitute medical advice.  Clinical decisions should be made by qualified healthcare providers.

## 2025-04-18 NOTE — CONSULTS
"[facility]  WO Nurse Inpatient Assessment     Consulted for: VAC on L foot    Summary: Patient s/p repeat I&D on 4/17/25.  Wound VAC restarted today.    [facility] nurse follow-up plan: Monday/WednesdayFriday    Patient History (according to provider note(s):      \"Patient presented with a left foot wound that developed after he used a razor blade to shave a callus on his left foot one week prior to admission. He has not been taking medications nor caring for his diabetes. Left Foot Xray revealed extensive soft tissue gas within the dorsal forefoot along the second through fifth metacarpal heads/necks and surrounding the fifth MTP joint, highly concerning for necrotizing soft tissue infection. There is some localized osteopenia at the medial base of the fifth proximal phalanx which may be secondary to superimposition of the soft tissue gas. No discrete osseous destructive change. Minimal scattered degenerative arthritis. Podiatry consulted and patient taken on 4/8/25 for left 5th ray resection with debridement.    Only to be weight bearing to heal on the left lower extremity, Cam boot per podiatry.\"    Assessment:      Areas visualized during today's visit: Focused:, Foot, and Left    Negative pressure wound therapy applied to: L foot      Last photo: 4/18   Wound due to: Trauma/ Used a razor blade to shave a callus   Wound history/plan of care:    Surgical date: 4/8, 4/17  Service following: Podiatry and WOC   Date Negative Pressure Wound Therapy initiated: 4/14   Interventions in place: offloading and elevation  Is patient s nutritional status compromised? no   If yes, what interventions are in place? N/A  Reason for initiating vac therapy? High risk of infections and Need for accelerated granulation tissue  Which?of?the?following?co-morbidities?apply? Diabetes and Obesity  If diabetic is patient on a diabetic management program? Yes   Is osteomyelitis present in wound? no   If yes what treatments " are in place? IV antibiotic clindamycin     Wound base: 30 % Fibrin and Adipose tissue, 30 % Tendon , 40 % Moist, red tissue     Palpation of the wound bed: normal       Drainage: moderate      Volume in cannister: 0     Last cannister change date: 4/18     Description of drainage: serosanguinous      Measurements (length x width x depth, in cm) 8  x 6.5  x  0.7 cm       Tunneling N/A      Undermining N/A   Periwound skin: Intact and Dry/scaly,       Color: dusky and black on lateral edge        Temperature: cool   Odor: none   Pain: denies   Pain intervention prior to dressing change: patient tolerated well, slow and gentle cares , and distraction  Treatment goal: Drainage control, Infection control/prevention, and Increase granulation  STATUS: evolving   Supplies ordered: gathered, supplies stored on unit, and discussed with patient     Number of foam pieces removed from a wound (excluding foam for bridge) :  NA-VAC restarted today    Verified this matched the number of foam pieces applied last dressing change: NA  Number of foam pieces packed into wound (excluding foam for bridge) :  1 GranuFoam Black and 1 Oil emulsion dressing     Treatment Plan:     Negative pressure wound therapy plan:  Wound location:  L foot   Change Days: Mon/Wed/Fri by WOC RN    Supplies (including all accessories) used: medium Black foam , Adaptic/Curity oil emulsion contact layer , Adapt barrier ring, and Cavilon no sting barrier film  Cleanse with Vashe prior to replacing NPWT  Suction setting: -125   Methods used: Window paned all periwound skin with vac drape prior to applying sponge, Cut foam in half to reduce profile, and Placed barrier ring into periwound creases to improve seal    Staff RN to assess integrity of dressing and ensure suction is set at appropriate level every shift.   Date canister. Chart canister output every shift. Change cannister weekly and PRN if full/occluded     Remove foam dressing and replace with BID  "normal saline moist gauze dressing if:   -a dressing failure which cannot be repaired within 2 hours   -patient is discharging to home without a home pump   -patient is discharging to a facility outside the local area   -if a dressing is a \"Silver Foam\", remove before Radiation Therapy or MRI     The hospital VAC pump is not to be discharged with the patient. Please disconnect the patient from the machine prior to discharge.  If a home VAC pump has been delivered, connect the home cannister to dressing tubing and the cannister to the home pump, turn on home pump  If the patient is transferring to a nearby facility with a VAC, the tubing can be disconnected, clamp tubing and cover the end with a glove, then can be reconnected if within 2 hours  If transfer will be longer than 2 hours, dressing must be removed and placed with a wet to moist gauze dressing for transfer      Orders: Reviewed    RECOMMEND PRIMARY TEAM ORDER: None, at this time  Education provided: plan of care, wound progress, Infection prevention , and Off-loading pressure  Discussed plan of care with: Patient and Nurse  Notify WOC if wound(s) deteriorate.  Nursing to notify the Provider(s) and re-consult the WOC Nurse if new skin concern.    DATA:     Current support surface: Standard  Standard gel mattress (Isoflex)  Containment of urine/stool: Continent of bladder and Continent of bowel  BMI: Body mass index is 31.47 kg/m .   Active diet order: Orders Placed This Encounter      Moderate Consistent Carb (60 g CHO per Meal) Diet     Output: I/O last 3 completed shifts:  In: 1060 [P.O.:960; I.V.:100]  Out: 10 [Blood:10]     Labs:   Recent Labs   Lab 04/18/25  0609   HGB 12.4*   WBC 10.2     Pressure injury risk assessment:   Sensory Perception: 4-->no impairment  Moisture: 4-->rarely moist  Activity: 3-->walks occasionally  Mobility: 3-->slightly limited  Nutrition: 3-->adequate  Friction and Shear: 3-->no apparent problem  Robel Score: 20    Fred " RICKEY Schumacher CWOCN  Contact [facility] Nurse ([place])  Dept. Office Number: [phone]

## 2025-04-18 NOTE — PLAN OF CARE
"Goal Outcome Evaluation:    Pertinent assessments: Assumed care of pt from 7427-0616. Pt A&O x4. Soft BP, otherwise VSS on capno. Up SBA with walker and minimal WB on L heel. Denies pain, nausea, SOB. PIV patent, infused IV clindamycin, then saline locked. B, 165. On tele: SR, 60, per tele tech.    Major Shift Events: none    Treatment Plan: Monitor CMS to L foot, pain management, wound vac, continue Diabetes/insulin education, discharge home pending    Bedside Nurse: Jackie Sue RN       Plan of Care Reviewed With: patient    Overall Patient Progress: improvingOverall Patient Progress: improving    Outcome Evaluation: Denies pain, nausea. IV clindamycin x1. PIV saline locked. B, 165. Tele: SR, 60, per tele tech.      Problem: Pain Acute  Goal: Optimal Pain Control and Function  Outcome: Progressing  Intervention: Prevent or Manage Pain  Recent Flowsheet Documentation  Taken 2025 2491 by Jackie Sue, RN  Sleep/Rest Enhancement: awakenings minimized  Medication Review/Management: medications reviewed     Problem: Adult Inpatient Plan of Care  Goal: Plan of Care Review  Description: The Plan of Care Review/Shift note should be completed every shift.  The Outcome Evaluation is a brief statement about your assessment that the patient is improving, declining, or no change.  This information will be displayed automatically on your shiftnote.  Outcome: Progressing  Flowsheets (Taken 2025 0650)  Outcome Evaluation: Denies pain, nausea. IV clindamycin x1. PIV saline locked. B, 165. Tele: SR, 60, per tele tech.  Plan of Care Reviewed With: patient  Overall Patient Progress: improving  Goal: Patient-Specific Goal (Individualized)  Description: You can add care plan individualizations to a care plan. Examples of Individualization might be:  \"Parent requests to be called daily at 9am for status\", \"I have a hard time hearing out of my right ear\", or \"Do not touch me to wake me up as it " "startlesme\".  Outcome: Progressing  Goal: Absence of Hospital-Acquired Illness or Injury  Outcome: Progressing  Intervention: Identify and Manage Fall Risk  Recent Flowsheet Documentation  Taken 4/18/2025 0358 by Jackie Sue RN  Safety Promotion/Fall Prevention:   activity supervised   assistive device/personal items within reach   clutter free environment maintained   mobility aid in reach   nonskid shoes/slippers when out of bed   safety round/check completed  Intervention: Prevent Skin Injury  Recent Flowsheet Documentation  Taken 4/18/2025 0358 by Jackie Sue RN  Body Position: position changed independently  Intervention: Prevent and Manage VTE (Venous Thromboembolism) Risk  Recent Flowsheet Documentation  Taken 4/18/2025 0358 by Jackie Sue RN  VTE Prevention/Management: SCDs off (sequential compression devices)  Intervention: Prevent Infection  Recent Flowsheet Documentation  Taken 4/18/2025 0358 by Jackie Sue RN  Infection Prevention:   hand hygiene promoted   rest/sleep promoted   single patient room provided  Goal: Optimal Comfort and Wellbeing  Outcome: Progressing  Intervention: Provide Person-Centered Care  Recent Flowsheet Documentation  Taken 4/18/2025 0358 by Jackie Sue RN  Trust Relationship/Rapport:   care explained   questions answered  Goal: Readiness for Transition of Care  Outcome: Progressing     Problem: Diabetes  Goal: Optimal Coping  Outcome: Progressing  Goal: Optimal Functional Ability  Outcome: Progressing  Intervention: Optimize Functional Ability  Recent Flowsheet Documentation  Taken 4/18/2025 0358 by Jackie Sue RN  Activity Management: activity adjusted per tolerance  Activity Assistance Provided: assistance, stand-by  Goal: Blood Glucose Level Within Target Range  Outcome: Progressing  Intervention: Optimize Glycemic Control  Recent Flowsheet Documentation  Taken 4/18/2025 0358 by Jackie Sue RN  Hyperglycemia Management: blood glucose monitored  Goal: " Minimize Risk of Hypoglycemia  Outcome: Progressing  Intervention: Management and Risk Reduction of Hypoglycemia  Recent Flowsheet Documentation  Taken 4/18/2025 0358 by Jackie Sue, RN  Hypoglycemia Management: blood glucose monitored     Problem: Wound  Goal: Optimal Coping  Outcome: Progressing  Goal: Optimal Functional Ability  Outcome: Progressing  Intervention: Optimize Functional Ability  Recent Flowsheet Documentation  Taken 4/18/2025 0358 by Jackie Sue, RN  Activity Management: activity adjusted per tolerance  Activity Assistance Provided: assistance, stand-by  Goal: Absence of Infection Signs and Symptoms  Outcome: Progressing  Goal: Improved Oral Intake  Outcome: Progressing  Goal: Optimal Pain Control and Function  Outcome: Progressing  Intervention: Prevent or Manage Pain  Recent Flowsheet Documentation  Taken 4/18/2025 0358 by Jackie Sue, RN  Sleep/Rest Enhancement: awakenings minimized  Goal: Skin Health and Integrity  Outcome: Progressing  Intervention: Optimize Skin Protection  Recent Flowsheet Documentation  Taken 4/18/2025 0358 by Jackie Sue, RN  Activity Management: activity adjusted per tolerance  Head of Bed (HOB) Positioning: HOB at 20-30 degrees  Goal: Optimal Wound Healing  Outcome: Progressing  Intervention: Promote Wound Healing  Recent Flowsheet Documentation  Taken 4/18/2025 0358 by Jackie Sue, RN  Sleep/Rest Enhancement: awakenings minimized

## 2025-04-19 VITALS
BODY MASS INDEX: 31.3 KG/M2 | HEIGHT: 75 IN | OXYGEN SATURATION: 98 % | DIASTOLIC BLOOD PRESSURE: 91 MMHG | RESPIRATION RATE: 12 BRPM | HEART RATE: 69 BPM | WEIGHT: 251.77 LBS | SYSTOLIC BLOOD PRESSURE: 136 MMHG | TEMPERATURE: 98.5 F

## 2025-04-19 LAB
GLUCOSE BLDC GLUCOMTR-MCNC: 105 MG/DL (ref 70–99)
GLUCOSE BLDC GLUCOMTR-MCNC: 118 MG/DL (ref 70–99)
GLUCOSE BLDC GLUCOMTR-MCNC: 128 MG/DL (ref 70–99)
MAGNESIUM SERPL-MCNC: 1.8 MG/DL (ref 1.7–2.3)
PHOSPHATE SERPL-MCNC: 3.7 MG/DL (ref 2.5–4.5)
POTASSIUM SERPL-SCNC: 3.9 MMOL/L (ref 3.4–5.3)

## 2025-04-19 PROCEDURE — 99239 HOSP IP/OBS DSCHRG MGMT >30: CPT | Performed by: HOSPITALIST

## 2025-04-19 PROCEDURE — 83735 ASSAY OF MAGNESIUM: CPT | Performed by: HOSPITALIST

## 2025-04-19 PROCEDURE — 84132 ASSAY OF SERUM POTASSIUM: CPT | Performed by: HOSPITALIST

## 2025-04-19 PROCEDURE — 250N000011 HC RX IP 250 OP 636: Mod: JZ | Performed by: PHYSICIAN ASSISTANT

## 2025-04-19 PROCEDURE — 250N000013 HC RX MED GY IP 250 OP 250 PS 637: Performed by: INTERNAL MEDICINE

## 2025-04-19 PROCEDURE — 250N000013 HC RX MED GY IP 250 OP 250 PS 637: Performed by: HOSPITALIST

## 2025-04-19 PROCEDURE — 36415 COLL VENOUS BLD VENIPUNCTURE: CPT | Performed by: HOSPITALIST

## 2025-04-19 PROCEDURE — 84100 ASSAY OF PHOSPHORUS: CPT | Performed by: HOSPITALIST

## 2025-04-19 RX ORDER — LISINOPRIL 20 MG/1
20 TABLET ORAL DAILY
Qty: 30 TABLET | Refills: 0 | Status: SHIPPED | OUTPATIENT
Start: 2025-04-20 | End: 2025-04-21

## 2025-04-19 RX ORDER — INSULIN ASPART 100 [IU]/ML
7 INJECTION, SOLUTION INTRAVENOUS; SUBCUTANEOUS
Qty: 21 ML | Refills: 0 | Status: SHIPPED | OUTPATIENT
Start: 2025-04-19 | End: 2025-04-21

## 2025-04-19 RX ORDER — ATORVASTATIN CALCIUM 40 MG/1
40 TABLET, FILM COATED ORAL EVERY EVENING
Qty: 30 TABLET | Refills: 0 | Status: SHIPPED | OUTPATIENT
Start: 2025-04-19 | End: 2025-04-21

## 2025-04-19 RX ORDER — METOPROLOL SUCCINATE 25 MG/1
25 TABLET, EXTENDED RELEASE ORAL DAILY
Qty: 30 TABLET | Refills: 0 | Status: SHIPPED | OUTPATIENT
Start: 2025-04-20 | End: 2025-04-21

## 2025-04-19 RX ORDER — HYDROCHLOROTHIAZIDE 25 MG/1
25 TABLET ORAL DAILY
Qty: 30 TABLET | Refills: 0 | Status: SHIPPED | OUTPATIENT
Start: 2025-04-20 | End: 2025-04-21

## 2025-04-19 RX ADMIN — CLINDAMYCIN PHOSPHATE 900 MG: 900 INJECTION, SOLUTION INTRAVENOUS at 13:12

## 2025-04-19 RX ADMIN — CLINDAMYCIN PHOSPHATE 900 MG: 900 INJECTION, SOLUTION INTRAVENOUS at 04:35

## 2025-04-19 RX ADMIN — METOPROLOL SUCCINATE 25 MG: 25 TABLET, EXTENDED RELEASE ORAL at 08:11

## 2025-04-19 RX ADMIN — LISINOPRIL 20 MG: 20 TABLET ORAL at 08:11

## 2025-04-19 RX ADMIN — HYDROCHLOROTHIAZIDE 25 MG: 25 TABLET ORAL at 08:11

## 2025-04-19 ASSESSMENT — ACTIVITIES OF DAILY LIVING (ADL)
ADLS_ACUITY_SCORE: 37

## 2025-04-19 NOTE — PROGRESS NOTES
Care Management Discharge Note    Discharge Date: 04/19/2025     Discharge Disposition: Home Care    Discharge Services: Home Care    Discharge DME: Wound Vac    Discharge Transportation: family or friend will provide    Private pay costs discussed: insurance costs co-pays    Patient/family educated on Medicare website which has current facility and service quality ratings:  Yes    Education Provided on the Discharge Plan: Yes    Persons Notified of Discharge Plans: Pt  Patient/Family in Agreement with the Plan: Yes      Handoff Referral Completed: No, handoff not indicated or clinically appropriate    Additional Information:  Pt discharging to home with wound vac and HC RN through [facility], first visit on Tuesday 4/22. Home Vac approved and delivered to pt's room. Proof of delivery document completed with pt. Bedside RN to complete checklist with pt when switching to home vac. Completed checklist to be faxed along with proof of delivery form and discharge orders, SW will complete this once completed checklist received. Bedside RN and pt aware. No further needs anticipated. HC orders sent to [facility].     Sury Corrigan RN BSN   Inpatient Care Coordination  [facility]   Phone [phone]

## 2025-04-19 NOTE — DISCHARGE SUMMARY
Discharge Note    Patient discharged to home via private vehicle .   IV: Discontinued  Prescriptions filled and given to patient/family.   Belongings reviewed and sent with patient.   Home medications returned to patient: NA  Equipment sent with: patient.   patient verbalizes understanding of discharge instructions. AVS given to patient.  Additional education completed? Diabetes Education, Wound Vac education.

## 2025-04-19 NOTE — DISCHARGE SUMMARY
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 [id number] equals 3  Hyponatremia. Hypochloremia.  Hypertension   Hyperlipidemia  Noncompliance before admission  Left atrium borderline enlargement. LVEF 50 - 55%      Clinically Significant Risk Factors     # DMII: A1C = 12.3 % (Ref range: <5.7 %) within past 6 months  # Obesity: Estimated body mass index is 31.47 kg/m  as calculated from the following:    Height as of this encounter: 1.905 m (6' 3\").    Weight as of this encounter: 114.2 kg (251 lb 12.3 oz).       Follow-ups Needed After Discharge   Follow-up Appointments       Follow Up      Follow up with Dr. Preciado at the [facility] wound healing [facility] in 3-4 weeks from discharge from hospital.    Questions concerns and appointments please call: [phone]            Additional follow-up instructions/to-do's for PCP    : routine     Unresulted Labs Ordered in the Past 30 Days of this Admission       Date and Time Order Name Status Description    4/17/2025 10:28 AM Surgical Pathology Exam In process         These results will be followed up by DPM and PCP    Discharge Disposition   Discharged to home  Condition at discharge: Stable    Hospital Course   Justino Giordano is a 65 year old male with a history of HTN, DM Type 2, HLD, Obesity who presents to the ED today with a left foot injury.  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  He has not been taking medications nor caring for his diabetes. .  He was afebrile, hypertensive, HR 90, RR 20.  WBC 16.4,  with negative lactic acid.  Left Foot Xray revealed extensive soft tissue gas within the dorsal forefoot along the second through fifth metacarpal heads/necks and surrounding the fifth MTP joint, highly concerning for necrotizing soft tissue infection. There is some localized osteopenia at the medial base of the fifth proximal phalanx which may be secondary to superimposition of the soft tissue gas. No discrete osseous destructive change. Minimal scattered degenerative arthritis.  He was placed on IV fluids and made NPO.  He was empirically placed on Vancomycin, Zosyn, Clindamycin. Wound Culture with gram stain with a  polymicrobial infection with culture showing laura and fusobacterium on admission and from surgery.  Will discontinue zosyn/vanco and continue on IV clindamycin, transition to oral when ok with podiatry,   Podiatry consulted and patient taken on 4/8/25 for left 5th ray resection with debridement.    Only to be weight bearing to heal on the left lower extremity, Cam boot per podiatry.  Patient works as a  for [employer]. Patient had more debridement per podiatry on 4/11, plan to go home with a wound vac, social work working on.  Wound care per podiatry . Pain well controlled, likely with severe neuropathy. Physical therapy to see to make sure he will be safe for home. Recommend outpatient physical therapy.  Patient had a normal ARASELI US of the lower extremity.    Pathology results prompted further surgery/debridement 4/17.         Noncompliance  Patient has not been taking any medications nor caring for his diabetes, hypertension.  This puts him at risk for readmissions, morbidity, and early mortality. We discussed the importance of taking care of his medical issues at length. Need to make  medications and care as easy as possible for him so that he will be compliant.       Untreated and Uncontrolled type 2 diabetes, will be insulin dependent with peripheral neuropathy  Hgb A1C 11.7 (4/2021) and was 6.6 in (2013).  BS on admission 282 with hemoglobin A1c up to 12.3.  Bicarb 20, AG 19.  Not technically DKA by criteria.  Patient denies he has a history of diabetes, although reports he has had high blood sugars in the past.  No longer takes Metformin and never started Lantus as recommended in 2021. Given the elevated A1c above 10 will need insulin.  Was started on lantus and an insulin correction scale.  Needs close primary care provider follow up with diabetic education.   Blood sugar 200s, continue on lantus and increase his prandial dosing.  I doubt with his compliance issues that he can do carb counting.     Needs ongoing insulin teaching per nursing     Hypertension, hyperlipidemia  -Previously on Atorvastatin, no longer takes this.  BP elevated 150-170s on arrival. , HDL 41, .  Will start on lipitor.   BP currently  138-150s/80s  - monitor bp trend,  - Ace inhibitor added 4/9. Added hydrochlorothiazide. 4/13, will need to  follow his BMP and BP in a few weeks with a primary care provider which he needs to establish     Hyponatremia, Hypochloremia  Sodium when corrected for glucose is 131, chloride 88.  - IVF hydration now off of IV fluids   -repeat BMP in am showing sodium stable 131. Creatinine normal     Tachy arrhythmia episode in PACU.  Telemetry  Atrial flutter, converted to NSR 4/17 6PM  [id number] equals 3  Start: Metoprolol, DOAC after approval by surgeon and send home with Zio patch.  Pharmacy liaison consulted   Cardiology  outpatient consult, kell to arrange      Echocardiogram 4/17.    Consultations This Hospital Stay   PHARMACY TO DOSE VANCO  PHARMACY TO DOSE VANCO  PODIATRY IP CONSULT  WOUND OSTOMY CONTINENCE NURSE  IP CONSULT  PHYSICAL THERAPY ADULT IP  CONSULT  WOUND OSTOMY CONTINENCE NURSE  IP CONSULT  CARE MANAGEMENT / SOCIAL WORK IP CONSULT  CARE MANAGEMENT / SOCIAL WORK IP CONSULT  SPIRITUAL HEALTH SERVICES IP CONSULT  WOUND OSTOMY CONTINENCE NURSE  IP CONSULT  PHARMACY LIAISON FOR MEDICATION COVERAGE CONSULT    Code Status   Full Code    Time Spent on this Encounter   I, Marcial Fried MD, personally saw the patient today and spent greater than 30 minutes discharging this patient.       Marcial Fried MD  [address] SURGICAL  [address] [place] [address]  Phone: [phone]  Fax: [phone]  ______________________________________________________________________    Physical Exam   Vital Signs: Temp: 98.5  F (36.9  C) Temp src: Oral BP: (!) 136/91 Pulse: 69   Resp: 12 SpO2: 98 % O2 Device: None (Room air)    Weight: 251 lbs 12.25 oz  Constitutional: awake, alert, cooperative, no apparent distress, and appears stated age  Cardiovascular: Normal apical impulse, regular rate and rhythm, normal S1 and S2, no S3 or S4, and no murmur noted  Musculoskeletal: There is no redness, warmth, or swelling of the joints.  Full range of motion noted.  Motor strength is 5 out of 5 all extremities bilaterally.  Tone is normal.       Primary Care Physician   Physician No Ref-Primary    Discharge Orders      Physical Therapy  Referral      Primary Care - Care Coordination Referral      Home Care Referral      Activity    Your activity upon discharge: Minimal heel weightbearing on the left foot in a postop shoe.  No ore than 10 minutes an hour on the foot at a time.     Follow Up    Follow up with Dr. Preciado at the [facility] wound healing institute in 3-4 weeks from discharge from hospital.    Questions concerns and appointments please call: [phone]     Wound care and dressings    Instructions to care for your wound at home: 3 times a week wound VAC dressing changes to the left foot with home care with the wound VAC  running continuously at 125 mmHg     ZIO PATCH 8-14 DAYS (additional cost to patient)    This order will incur an additional cost to the patient. If the patient doesn't want to pay cost for application, please consider the Zio Patch Mail Out order.     Walker Order for DME - ONLY FOR DME     ZIO PATCH MAIL OUT       Significant Results and Procedures   Results for orders placed or performed during the hospital encounter of 04/07/25   Foot XR, G/E 3 views, left    Narrative    EXAM: XR FOOT LEFT G/E 3 VIEWS  LOCATION: [facility]  DATE: 4/7/2025    INDICATION: left foot wound  COMPARISON: None.      Impression    IMPRESSION: Extensive soft tissue gas within the dorsal forefoot along the second through fifth metacarpal heads/necks and surrounding the fifth MTP joint, highly concerning for necrotizing soft tissue infection. There is some localized osteopenia at the   medial base of the fifth proximal phalanx which may be secondary to superimposition of the soft tissue gas. No discrete osseous destructive change. Minimal scattered degenerative arthritis. Plantar calcaneal spur. Benign dystrophic calcifications along   the course of the plantar fascia. Arterial calcifications.    NOTE: ABNORMAL REPORT    THE DICTATION ABOVE DESCRIBES AN ABNORMALITY FOR WHICH FOLLOW-UP IS NEEDED.    MR Foot Left w/o Contrast     Value    Radiologist flags Presumed necrotizing infection, in the left foot,    Narrative    Exam: MRI of the left forefoot and midfoot without contrast dated  4/7/2025.    COMPARISON: Radiographs from the same day.    CLINICAL HISTORY: Evaluate for osteomyelitis in patient with fifth  digit infection.    TECHNIQUE: Multiplanar, multisequence MR imaging of the left forefoot  and midfoot was obtained using standard sequences in 3 orthogonal  planes without the use of intravenous or intra-articular gadolinium  contrast.    FINDINGS:    As seen on the plain radiographs, there is extensive air  within the  subcutaneous soft tissues along the dorsal aspect of the left foot,  dorsal to the third through fifth distal metatarsals, greatest along  the dorsal aspect of the distal fifth metatarsal, at the fifth  metatarsophalangeal joint. Subtle bone marrow edema in the distal  fifth metatarsal as well as in the proximal to mid aspect of the fifth  toe proximal phalanx, with no significant low T1 signal. No  significant fluid at the left fifth metatarsophalangeal joint.     Scattered degenerative changes with subchondral edema including at the  distal first metatarsophalangeal joint as well as at the navicular  cuneiform joint.    The Lisfranc ligament is intact and the Lisfranc joint is intact.    Minimal fluid in the third intermetatarsal bursa.    Mild fatty infiltration within the muscles about the foot with  extensive soft tissue edema. No discrete abscess although contrast was  not administered, mildly limiting evaluation.    The tendinous structures are grossly intact. No intermetatarsal  masses.      Impression    IMPRESSION:  1. Extensive presumed necrotizing infection within the soft tissues of  the left forefoot/midfoot with soft tissue defect seen along the  dorsal lateral aspect of the foot. There is extensive gas formation,  as seen on the radiographs, extending along the dorsal aspect of the  third through fifth metatarsals, greatest at the fifth  metatarsophalangeal joint. No significant joint effusion at the fifth  metatarsophalangeal joint to suggest septic joint. There is reactive  bone marrow edema in the distal fifth metatarsal, and in the fifth toe  proximal phalanx without definite corresponding low T1 signal,  findings presumed to represent osteitis however early osteomyelitis  cannot be excluded. Correlate with clinical exam.    2. Diffuse soft tissue edema within the subcutaneous tissues and  within the muscles of the left forefoot/midfoot, most likely  representing a  cellulitis/myositis. No discrete abscess although  contrast was not administered.    3. Degenerative changes at the navicular cuneiform joint as well as  first metatarsophalangeal joint.    [Consider Follow Up: Presumed necrotizing infection, in the left foot,  as above]    This report will be copied to the [facility] to ensure a  provider acknowledges the finding. [facility] is available Monday  through Friday 8am-3:30 pm.       BRIEN MICHAELS MD         SYSTEM ID:  [id number]   XR Foot Port Left 2 Views    Narrative    EXAM: XR FOOT PORT LEFT 2 VIEWS  LOCATION: [facility]  DATE: 4/8/2025    INDICATION: s p fifth ray resection  COMPARISON: 04/07/2025      Impression    IMPRESSION: Interval transmetatarsal resection of the fifth ray. No acute fracture or erosion. Soft tissue gas along the dorsal foot could represent sequela of surgery or ongoing soft tissue infection, correlate with physical exam.    Plantar calcaneal spur. Heterotopic ossification overlying the proximal plantar fascia. Bipartite tibial great toe sesamoid. Vascular calcifications.   US ARASELI Doppler No Exercise    Narrative    EXAM: RESTING ANKLE-BRACHIAL INDICES (ABIs)  LOCATION: [facility]  DATE: 4/9/2025    INDICATION: Left foot infection, evalutae for PAD  COMPARISON: None.    ARASELI FINDINGS:  RIGHT  Brachial: 122  Ankle (PT): 177 Index: 1.45  Ankle (DP): 161 Index: 1.32    LEFT  Brachial: 122  Ankle (PT): 167 Index: 1.37  Ankle (DP): 135 Index: 1.11    Distal posterior tibial and dorsalis pedis arteries are triphasic.      Impression    IMPRESSION:  1.  Normal ARASELI examination.   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 Postoperative soft tissue gas about the  left lateral foot. No acute fracture. Mild scattered degenerative arthritis. Plantar calcaneal spur. Arterial calcifications.   Echocardiogram Complete     Value    LVEF  50-55%    Narrative    [id number]  [id number]  [id number]  [id number]^ELVER^CIERA^SACHIN     [facility]  Echocardiography Laboratory  [address] [place] [address]     Name: RUSS ONEILL  MRN: [id number]  : 1960  Study Date: 2025 03:02 PM  Age: 65 yrs  Gender: Male  Patient Location: [facility]  Reason For Study: Aflutter  Ordering Physician: CIERA RAYA  Performed By: Val Gracia     BSA: 2.4 m2  Height: 75 in  Weight: 251 lb  HR: 95  ______________________________________________________________________________  Procedure  Echocardiogram with two-dimensional, color and spectral Doppler.  ______________________________________________________________________________  Interpretation Summary     The visual ejection fraction is 50-55%.  The rhythm was atrial flutter.  The left atrium is borderline dilated.  Ascending aorta dilatation is present.  ______________________________________________________________________________  Left Ventricle  The left ventricle is normal in size. There is normal left ventricular wall  thickness. The visual ejection fraction is 50-55%.     Atria  The left atrium is borderline dilated. Right atrial size is normal.     Aortic Valve  There is mild trileaflet aortic sclerosis.     Pulmonic Valve  Normal pulmonic valve.     Vessels  The aortic root is normal size. Ascending aorta dilatation is present. The  inferior vena cava is normal.     Pericardium  There is no pericardial effusion.     Rhythm  The rhythm was atrial flutter.     ______________________________________________________________________________  MMode/2D Measurements & Calculations  IVSd: 1.0 cm  LVIDd: 5.1 cm  LVIDs: 3.6 cm  LVPWd: 0.90 cm  IVC diam: 1.0 cm  FS: 31.0 %  LV mass(C)d: 180.6 grams  LV mass(C)dI: 74.7  grams/m2  Ao root diam: 3.5 cm  asc Aorta Diam: 4.0 cm  LVOT diam: 2.4 cm  LVOT area: 4.4 cm2  Ao root diam index Ht(cm/m): 1.8     Ao root diam index BSA (cm/m2): 1.4  Asc Ao diam index BSA (cm/m2): 1.6  Asc Ao diam index Ht(cm/m): 2.1  LA Volume (BP): 65.2 ml  LA Volume Index (BP): 26.9 ml/m2  RV Base: 3.6 cm  RWT: 0.35  TAPSE: 1.7 cm     Doppler Measurements & Calculations  MV E max kervin: 78.4 cm/sec  MV dec time: 0.13 sec  PA acc time: 0.10 sec  TR max kervin: 161.0 cm/sec  TR max PG: 10.4 mmHg  E/E' av.1  Lateral E/e': 6.5  Medial E/e': 7.7  RV S Kervin: 11.7 cm/sec     ______________________________________________________________________________  Report approved by: Bismark Reyes MD on 2025 03:59 PM             Discharge Medications   Current Discharge Medication List        START taking these medications    Details   blood glucose (NO BRAND SPECIFIED) lancets standard Use to test blood sugar 3 times daily  Qty: 100 Lancet, Refills: 0    Associated Diagnoses: Type 2 diabetes mellitus without complication, with long-term current use of insulin (H)      blood glucose (NO BRAND SPECIFIED) test strip Use to test blood sugar 3 times daily or as directed.  Qty: 100 strip, Refills: 0    Associated Diagnoses: Type 2 diabetes mellitus without complication, with long-term current use of insulin (H)      blood glucose monitoring (NO BRAND SPECIFIED) meter device kit Use to test blood sugar 3 times daily or as directed.  Qty: 1 kit, Refills: 0    Associated Diagnoses: Type 2 diabetes mellitus without complication, with long-term current use of insulin (H)           Allergies   No Known Allergies

## 2025-04-19 NOTE — PLAN OF CARE
End of Shift Summary  For vital signs and complete assessments, please see documentation flowsheets.     Pertinent assessments: A&O x4. VSS on RA. Denies pain, N/V, SOB. Up SBA with walker, minimal WB to left foot with cam boot in place. Tolerating a mod CHO diet. Elevating L extremity in bed. Wound vac in place, minimal output. PIV SL. IV clindamycin given. Tele running SR 60's per tele tech. , 105.    Major Shift Events: Uneventful    Treatment Plan: Cont diabetic education, Wound vac. HC at discharge. May need anticoagulant at discharge.    Bedside Nurse: Darby Wade RN                 Goal Outcome Evaluation:      Plan of Care Reviewed With: patient    Overall Patient Progress: improvingOverall Patient Progress: improving    Outcome Evaluation: L foot wound vac in place, denies pain, IV clindamycin given                  Problem: Pain Acute  Goal: Optimal Pain Control and Function  Outcome: Progressing  Intervention: Optimize Psychosocial Wellbeing  Recent Flowsheet Documentation  Taken 4/18/2025 2017 by Darby Wade RN  Diversional Activities: television  Intervention: Develop Pain Management Plan  Recent Flowsheet Documentation  Taken 4/19/2025 0500 by Darby Wade RN  Pain Management Interventions: declines  Taken 4/18/2025 2017 by Darby Wade RN  Pain Management Interventions: declines  Intervention: Prevent or Manage Pain  Recent Flowsheet Documentation  Taken 4/18/2025 2017 by Darby Wade RN  Sleep/Rest Enhancement:   awakenings minimized   consistent schedule promoted   noise level reduced   regular sleep/rest pattern promoted   room darkened  Medication Review/Management: medications reviewed     Problem: Adult Inpatient Plan of Care  Goal: Plan of Care Review  Description: The Plan of Care Review/Shift note should be completed every shift.  The Outcome Evaluation is a brief statement about your assessment that the patient is improving, declining, or no change.  " This information will be displayed automatically on your shiftnote.  Outcome: Progressing  Flowsheets (Taken 4/19/2025 0614)  Outcome Evaluation: L foot wound vac in place, denies pain, IV clindamycin given  Plan of Care Reviewed With: patient  Overall Patient Progress: improving  Goal: Patient-Specific Goal (Individualized)  Description: You can add care plan individualizations to a care plan. Examples of Individualization might be:  \"Parent requests to be called daily at 9am for status\", \"I have a hard time hearing out of my right ear\", or \"Do not touch me to wake me up as it startlesme\".  Outcome: Progressing  Goal: Absence of Hospital-Acquired Illness or Injury  Outcome: Progressing  Intervention: Identify and Manage Fall Risk  Recent Flowsheet Documentation  Taken 4/18/2025 2017 by Darby Wade RN  Safety Promotion/Fall Prevention:   activity supervised   assistive device/personal items within reach   clutter free environment maintained   nonskid shoes/slippers when out of bed   patient and family education   room near nurse's station   room organization consistent   safety round/check completed   supervised activity  Intervention: Prevent Skin Injury  Recent Flowsheet Documentation  Taken 4/18/2025 2017 by Darby Wade RN  Body Position: position changed independently  Skin Protection: adhesive use limited  Intervention: Prevent and Manage VTE (Venous Thromboembolism) Risk  Recent Flowsheet Documentation  Taken 4/18/2025 2017 by Darby Wade RN  VTE Prevention/Management: SCDs off (sequential compression devices)  Intervention: Prevent Infection  Recent Flowsheet Documentation  Taken 4/18/2025 2017 by Darby Wade RN  Infection Prevention:   cohorting utilized   hand hygiene promoted   rest/sleep promoted   single patient room provided  Goal: Optimal Comfort and Wellbeing  Outcome: Progressing  Intervention: Monitor Pain and Promote Comfort  Recent Flowsheet Documentation  Taken " 4/19/2025 0500 by Darby Wade RN  Pain Management Interventions: declines  Taken 4/18/2025 2017 by Darby Wade RN  Pain Management Interventions: declines  Intervention: Provide Person-Centered Care  Recent Flowsheet Documentation  Taken 4/18/2025 2017 by Darby Wade RN  Trust Relationship/Rapport:   care explained   choices provided   emotional support provided   questions encouraged   thoughts/feelings acknowledged  Goal: Readiness for Transition of Care  Outcome: Progressing     Problem: Diabetes  Goal: Optimal Coping  Outcome: Progressing  Intervention: Support Wellbeing and Self-Management Success  Recent Flowsheet Documentation  Taken 4/18/2025 2017 by Darby Wade RN  Family/Support System Care: self-care encouraged  Goal: Optimal Functional Ability  Outcome: Progressing  Intervention: Optimize Functional Ability  Recent Flowsheet Documentation  Taken 4/18/2025 2017 by Darby Wade RN  Assistive Device Utilized: walker  Activity Management: activity adjusted per tolerance  Activity Assistance Provided: assistance, stand-by  Goal: Blood Glucose Level Within Target Range  Outcome: Progressing  Goal: Minimize Risk of Hypoglycemia  Outcome: Progressing     Problem: Wound  Goal: Optimal Coping  Outcome: Progressing  Intervention: Support Patient and Family Response  Recent Flowsheet Documentation  Taken 4/18/2025 2017 by Darby Wade RN  Family/Support System Care: self-care encouraged  Goal: Optimal Functional Ability  Outcome: Progressing  Intervention: Optimize Functional Ability  Recent Flowsheet Documentation  Taken 4/18/2025 2017 by Darby Wade RN  Assistive Device Utilized: walker  Activity Management: activity adjusted per tolerance  Activity Assistance Provided: assistance, stand-by  Goal: Absence of Infection Signs and Symptoms  Outcome: Progressing  Goal: Improved Oral Intake  Outcome: Progressing  Goal: Optimal Pain Control and Function  Outcome:  Progressing  Intervention: Prevent or Manage Pain  Recent Flowsheet Documentation  Taken 4/19/2025 0500 by Darby Wade, RN  Pain Management Interventions: declines  Taken 4/18/2025 2017 by Darby Wade RN  Pain Management Interventions: declines  Sleep/Rest Enhancement:   awakenings minimized   consistent schedule promoted   noise level reduced   regular sleep/rest pattern promoted   room darkened  Goal: Skin Health and Integrity  Outcome: Progressing  Intervention: Optimize Skin Protection  Recent Flowsheet Documentation  Taken 4/18/2025 2017 by Darby Wade RN  Activity Management: activity adjusted per tolerance  Head of Bed (HOB) Positioning: HOB at 20-30 degrees  Goal: Optimal Wound Healing  Outcome: Progressing  Intervention: Promote Wound Healing  Recent Flowsheet Documentation  Taken 4/18/2025 2017 by Darby Wade RN  Sleep/Rest Enhancement:   awakenings minimized   consistent schedule promoted   noise level reduced   regular sleep/rest pattern promoted   room darkened

## 2025-04-19 NOTE — PLAN OF CARE
"Goal Outcome Evaluation:      Plan of Care Reviewed With: patient    Overall Patient Progress: improvingOverall Patient Progress: improving    Outcome Evaluation: L. Foot wound vac in place. Denies pain. Continue diabetes education. SBA to bathroom. CAM boot when OOB. Tele SR 80s      Problem: Pain Acute  Goal: Optimal Pain Control and Function  Outcome: Progressing  Intervention: Prevent or Manage Pain  Recent Flowsheet Documentation  Taken 4/18/2025 1715 by Kami Orlando, RN  Medication Review/Management: medications reviewed     Problem: Adult Inpatient Plan of Care  Goal: Plan of Care Review  Description: The Plan of Care Review/Shift note should be completed every shift.  The Outcome Evaluation is a brief statement about your assessment that the patient is improving, declining, or no change.  This information will be displayed automatically on your shiftnote.  Outcome: Progressing  Flowsheets (Taken 4/18/2025 2020)  Outcome Evaluation: L. Foot wound vac in place. Denies pain. Continue diabetes education.  Plan of Care Reviewed With: patient  Overall Patient Progress: improving  Goal: Patient-Specific Goal (Individualized)  Description: You can add care plan individualizations to a care plan. Examples of Individualization might be:  \"Parent requests to be called daily at 9am for status\", \"I have a hard time hearing out of my right ear\", or \"Do not touch me to wake me up as it startlesme\".  Outcome: Progressing  Goal: Absence of Hospital-Acquired Illness or Injury  Outcome: Progressing  Intervention: Identify and Manage Fall Risk  Recent Flowsheet Documentation  Taken 4/18/2025 1715 by Kami Orlando, RN  Safety Promotion/Fall Prevention:   activity supervised   clutter free environment maintained   increased rounding and observation   increase visualization of patient  Intervention: Prevent Skin Injury  Recent Flowsheet Documentation  Taken 4/18/2025 1715 by Kami Orlando, RN  Body Position: position changed " independently  Goal: Optimal Comfort and Wellbeing  Outcome: Progressing  Goal: Readiness for Transition of Care  Outcome: Progressing     Problem: Diabetes  Goal: Optimal Coping  Outcome: Progressing  Goal: Optimal Functional Ability  Outcome: Progressing  Intervention: Optimize Functional Ability  Recent Flowsheet Documentation  Taken 4/18/2025 1715 by Kami Orlando RN  Assistive Device Utilized:   walker   gait belt  Activity Management: ambulated to bathroom  Activity Assistance Provided: assistance, stand-by  Goal: Blood Glucose Level Within Target Range  Outcome: Progressing  Goal: Minimize Risk of Hypoglycemia  Outcome: Progressing     Problem: Wound  Goal: Optimal Coping  Outcome: Progressing  Goal: Optimal Functional Ability  Outcome: Progressing  Intervention: Optimize Functional Ability  Recent Flowsheet Documentation  Taken 4/18/2025 1715 by Kami Orlando RN  Assistive Device Utilized:   walker   gait belt  Activity Management: ambulated to bathroom  Activity Assistance Provided: assistance, stand-by  Goal: Absence of Infection Signs and Symptoms  Outcome: Progressing  Goal: Improved Oral Intake  Outcome: Progressing  Goal: Optimal Pain Control and Function  Outcome: Progressing  Goal: Skin Health and Integrity  Outcome: Progressing  Intervention: Optimize Skin Protection  Recent Flowsheet Documentation  Taken 4/18/2025 1715 by Kami Orlando RN  Activity Management: ambulated to bathroom  Head of Bed (HOB) Positioning: HOB at 20-30 degrees  Goal: Optimal Wound Healing  Outcome: Progressing

## 2025-04-21 ENCOUNTER — TELEPHONE (OUTPATIENT)
Dept: FAMILY MEDICINE | Facility: CLINIC | Age: 65
End: 2025-04-21

## 2025-04-21 ENCOUNTER — OFFICE VISIT (OUTPATIENT)
Dept: FAMILY MEDICINE | Facility: CLINIC | Age: 65
End: 2025-04-21
Payer: MEDICARE

## 2025-04-21 ENCOUNTER — OFFICE VISIT (OUTPATIENT)
Dept: PHARMACY | Facility: CLINIC | Age: 65
End: 2025-04-21
Payer: COMMERCIAL

## 2025-04-21 ENCOUNTER — PATIENT OUTREACH (OUTPATIENT)
Dept: CARE COORDINATION | Facility: CLINIC | Age: 65
End: 2025-04-21

## 2025-04-21 VITALS
OXYGEN SATURATION: 97 % | DIASTOLIC BLOOD PRESSURE: 83 MMHG | SYSTOLIC BLOOD PRESSURE: 109 MMHG | TEMPERATURE: 97.9 F | HEART RATE: 96 BPM | WEIGHT: 245 LBS | BODY MASS INDEX: 30.46 KG/M2 | HEIGHT: 75 IN | RESPIRATION RATE: 20 BRPM

## 2025-04-21 DIAGNOSIS — Z79.4 TYPE 2 DIABETES MELLITUS WITHOUT COMPLICATION, WITH LONG-TERM CURRENT USE OF INSULIN (H): Primary | ICD-10-CM

## 2025-04-21 DIAGNOSIS — E78.5 HYPERLIPIDEMIA LDL GOAL <70: ICD-10-CM

## 2025-04-21 DIAGNOSIS — E11.52 TYPE 2 DIABETES MELLITUS WITH DIABETIC PERIPHERAL ANGIOPATHY AND GANGRENE, WITH LONG-TERM CURRENT USE OF INSULIN (H): Primary | ICD-10-CM

## 2025-04-21 DIAGNOSIS — E11.9 TYPE 2 DIABETES MELLITUS WITHOUT COMPLICATION, WITH LONG-TERM CURRENT USE OF INSULIN (H): Primary | ICD-10-CM

## 2025-04-21 DIAGNOSIS — I48.92 ATRIAL FLUTTER, PAROXYSMAL (H): ICD-10-CM

## 2025-04-21 DIAGNOSIS — I48.92 ATRIAL FLUTTER, UNSPECIFIED TYPE (H): ICD-10-CM

## 2025-04-21 DIAGNOSIS — I10 ESSENTIAL HYPERTENSION, BENIGN: ICD-10-CM

## 2025-04-21 DIAGNOSIS — Z79.4 TYPE 2 DIABETES MELLITUS WITH DIABETIC PERIPHERAL ANGIOPATHY AND GANGRENE, WITH LONG-TERM CURRENT USE OF INSULIN (H): Primary | ICD-10-CM

## 2025-04-21 DIAGNOSIS — I10 BENIGN ESSENTIAL HYPERTENSION: ICD-10-CM

## 2025-04-21 DIAGNOSIS — E78.5 HYPERLIPIDEMIA LDL GOAL <100: ICD-10-CM

## 2025-04-21 PROBLEM — I50.20 HEART FAILURE WITH REDUCED EJECTION FRACTION (H): Status: ACTIVE | Noted: 2025-04-21

## 2025-04-21 LAB
ATRIAL RATE - MUSE: NORMAL BPM
DIASTOLIC BLOOD PRESSURE - MUSE: NORMAL MMHG
INTERPRETATION ECG - MUSE: NORMAL
P AXIS - MUSE: NORMAL DEGREES
PR INTERVAL - MUSE: NORMAL MS
QRS DURATION - MUSE: 96 MS
QT - MUSE: 508 MS
QTC - MUSE: 540 MS
R AXIS - MUSE: -37 DEGREES
SYSTOLIC BLOOD PRESSURE - MUSE: NORMAL MMHG
T AXIS - MUSE: 14 DEGREES
VENTRICULAR RATE- MUSE: 68 BPM

## 2025-04-21 PROCEDURE — 1111F DSCHRG MED/CURRENT MED MERGE: CPT | Performed by: PHYSICIAN ASSISTANT

## 2025-04-21 PROCEDURE — 1111F DSCHRG MED/CURRENT MED MERGE: CPT | Performed by: PHARMACIST

## 2025-04-21 PROCEDURE — 99207 PR NO CHARGE LOS: CPT | Performed by: PHARMACIST

## 2025-04-21 PROCEDURE — 99495 TRANSJ CARE MGMT MOD F2F 14D: CPT | Performed by: PHYSICIAN ASSISTANT

## 2025-04-21 PROCEDURE — 1126F AMNT PAIN NOTED NONE PRSNT: CPT | Performed by: PHYSICIAN ASSISTANT

## 2025-04-21 PROCEDURE — 3079F DIAST BP 80-89 MM HG: CPT | Performed by: PHYSICIAN ASSISTANT

## 2025-04-21 PROCEDURE — 3074F SYST BP LT 130 MM HG: CPT | Performed by: PHYSICIAN ASSISTANT

## 2025-04-21 RX ORDER — METOPROLOL SUCCINATE 25 MG/1
25 TABLET, EXTENDED RELEASE ORAL DAILY
Qty: 90 TABLET | Refills: 0 | Status: SHIPPED | OUTPATIENT
Start: 2025-04-21

## 2025-04-21 RX ORDER — LISINOPRIL 20 MG/1
20 TABLET ORAL DAILY
Qty: 90 TABLET | Refills: 0 | Status: SHIPPED | OUTPATIENT
Start: 2025-04-21

## 2025-04-21 RX ORDER — ATORVASTATIN CALCIUM 40 MG/1
40 TABLET, FILM COATED ORAL EVERY EVENING
Qty: 30 TABLET | Refills: 0 | Status: SHIPPED | OUTPATIENT
Start: 2025-04-21 | End: 2025-04-21

## 2025-04-21 RX ORDER — INSULIN ASPART 100 [IU]/ML
7 INJECTION, SOLUTION INTRAVENOUS; SUBCUTANEOUS
Qty: 30 ML | Refills: 0 | Status: SHIPPED | OUTPATIENT
Start: 2025-04-21

## 2025-04-21 RX ORDER — ATORVASTATIN CALCIUM 40 MG/1
40 TABLET, FILM COATED ORAL DAILY
Qty: 90 TABLET | Refills: 0 | Status: SHIPPED | OUTPATIENT
Start: 2025-04-21

## 2025-04-21 RX ORDER — HYDROCHLOROTHIAZIDE 25 MG/1
25 TABLET ORAL DAILY
Qty: 90 TABLET | Refills: 0 | Status: SHIPPED | OUTPATIENT
Start: 2025-04-21

## 2025-04-21 RX ORDER — ACYCLOVIR 400 MG/1
1 TABLET ORAL
Qty: 3 EACH | Refills: 5 | Status: SHIPPED | OUTPATIENT
Start: 2025-04-21 | End: 2025-04-22

## 2025-04-21 RX ORDER — ACYCLOVIR 400 MG/1
1 TABLET ORAL ONCE
Qty: 1 EACH | Refills: 0 | Status: SHIPPED | OUTPATIENT
Start: 2025-04-21 | End: 2025-04-22

## 2025-04-21 ASSESSMENT — PAIN SCALES - GENERAL: PAINLEVEL_OUTOF10: NO PAIN (0)

## 2025-04-21 NOTE — PROGRESS NOTES
"  Assessment & Plan     Type 2 diabetes mellitus with diabetic peripheral angiopathy and gangrene, with long-term current use of insulin (H):  - Diabetes management initiated in the hospital with insulin. Challenges with insulin administration noted, including difficulty with pen usage.  - Referral to clinical pharmacist for insulin management and diabetes education. Home care nurses to assist with insulin administration if possible as well when they come out tomorrow. Refill prescriptions for Xarelto, metoprolol, lisinopril, hydrochlorothiazide, and atorvastatin. Referral to optometrist for regular eye checks due to diabetes.    Essential hypertension, benign:  - Blood pressure monitored and reported as stable.  - Continue current antihypertensive medications. Refill prescriptions as needed.    Atrial flutter, paroxysmal (H):  - Atrial flutter noted during hospital stay.  - ECHO done in hospital, EF 50-55%  - Referral to cardiology for further evaluation and management.    Hyperlipidemia LDL goal <100:  - Hyperlipidemia management ongoing.  - Refill atorvastatin prescription. Continue monitoring lipid levels.    Consent was obtained from the patient to use an AI documentation tool in the creation of this note.        MED REC REQUIRED  Post Medication Reconciliation Status: discharge medications reconciled, continue medications without change  BMI  Estimated body mass index is 30.62 kg/m  as calculated from the following:    Height as of this encounter: 1.905 m (6' 3\").    Weight as of this encounter: 111.1 kg (245 lb).   Weight management plan: Discussed healthy diet and exercise guidelines      The longitudinal plan of care for the diagnosis(es)/condition(s) as documented were addressed during this visit. Due to the added complexity in care, I will continue to support Justino in the subsequent management and with ongoing continuity of care.    Subjective   Justino is a 65 year old, presenting for the following health " issues:  Hospital F/U        4/21/2025     8:39 AM   Additional Questions   Roomed by Rekha         4/21/2025     8:39 AM   Patient Reported Additional Medications   Patient reports taking the following new medications none     HPI        Hospital Follow-up Visit:    Hospital/Nursing Home/IP Rehab Facility: [facility]  Most Recent Admission Date: 4/7/2025   Most Recent Admission Diagnosis: Gas gangrene of foot (H) - A48.0  Wound of foot - S91.309A     Most Recent Discharge Date: 4/19/2025   Most Recent Discharge Diagnosis: Wound of foot - S91.309A  Gas gangrene of foot (H) - A48.0  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  No  Do you have any other stressors you would like to discuss with your provider? No    Problems taking medications regularly:  None  Medication changes since discharge: None  Problems adhering to non-medication therapy:  None    Summary of hospitalization:  [facility] discharge summary reviewed  Diagnostic Tests/Treatments reviewed.  Follow up needed: yes  Other Healthcare Providers Involved in Patient s Care:         Homecare and Specialist appointment - wound appointment next week  Update since discharge: improved.         Plan of care communicated with patient           History of Present Illness    Justino Giordano, 65 years    Foot Condition  - Underwent 5th metatarsal amputation surgery related to the foot  "condition.  - Initial symptoms included redness on the top of the foot extending to the smaller toe, progressing to dark purple discoloration.  Had gone in to  first and they sent him to the ED and then he was admitted that day.  - Used a razor to trim the area, which may have contributed to the condition.  - Hospitalized from April 7, 2025, until the day before the encounter.    Diabetes Management  - Previous concern about blood sugar levels noted in 2021, but no follow-up until recent hospitalization.  - Started on insulin during recent hospitalization.  Feeling like this is going ok but could use some education for insulin use etc.  - Wound vac is beeping every once in a while at him.  He is not sure why.  Home care is coming tomorrow to assist.  - Blood sugar levels have been stable, ranging from 115 to 180 during hospitalization.    Heart Concerns  - Noted irregular heartbeat during hospitalization.  - Underwent heart tests while in the hospital.   - put on Xarelto and metoprolol for a-flutter              Objective    /83 (BP Location: Right arm, Patient Position: Sitting, Cuff Size: Adult Large)   Pulse 96   Temp 97.9  F (36.6  C) (Oral)   Resp 20   Ht 1.905 m (6' 3\")   Wt 111.1 kg (245 lb)   SpO2 97%   BMI 30.62 kg/m    Body mass index is 30.62 kg/m .  Physical Exam   GENERAL: alert and no distress  RESP: lungs clear to auscultation - no rales, rhonchi or wheezes  CV: regular rate and rhythm, normal S1 S2, no S3 or S4, no murmur, click or rub, no peripheral edema  MS: left foot in post op boot/shoe  PSYCH: mentation appears normal, affect normal/bright            Signed Electronically by: Adrian Ellison PA-C    "

## 2025-04-21 NOTE — TELEPHONE ENCOUNTER
Forms/Letter Request    Type of form/letter: OTHER:  [facility]      Do we have the form/letter: Yes:  in basket     Who is the form from? Home care    Where did/will the form come from? form was faxed in    When is form/letter needed by: ASAP     How would you like the form/letter returned: Fax : [phone]    Patient Notified form requests are processed in 5-7 business days:No    Could we send this information to you in [email or web address] or would you prefer to receive a phone call?:   No preference   Okay to leave a detailed message?: No at Other phone number:   Fax : [phone]    Isha Scott  Patient Representative  [facility]

## 2025-04-21 NOTE — PATIENT INSTRUCTIONS
Recommendation(s) from today's visit:                                                      Insulin pens  Store extra in the fridge.  The one you use good for 28 days    Remember to prime the pen by 2 units each time.    Increase Lantus to 28 units daily. Then increase every 4 days but 2 units until fasting blood sugar in the morning is below 130mg/dL.    Novolog continue on with 7 units. But you can add extra unit if the blood sugar is higher than 150mg/dL. Take this 15 minutes before eating.   <70 mg/dL:           take no insulin injections  70-99 mg/dL:        minus 2 units   100-150 mg/dL:    no adjustment = 7 units  150-199 mg/dL:    add 1 units (= 8 units)  200-249 mg/dL:    add 2 units  (= 9 units)  250-299 mg/dL:    add 3 units  (= 10 units)  >300 mg/dL:         add 4 units and call your doctor  (= 11 units)       Medication List            Accurate as of April 21, 2025  3:04 PM. If you have any questions, ask your nurse or doctor.                CHANGE how you take these medications          Morning Afternoon Evening Bedtime    atorvastatin 40 MG tablet  Also known as: LIPITOR  Take 1 tablet (40 mg) by mouth daily.  What changed: when to take this  Changed by: Adrian Ellison        1 tablet         insulin glargine 100 UNIT/ML pen  Also known as: LANTUS PEN  Inject 28 Units subcutaneously every morning (before breakfast). Plus 2 units for priming. Increase by 2 units every 4 days if fasting blood sugar over 130 mg/dL. Do not exceed 50 units per day.  Doctor's comments: If Lantus is not covered by insurance, may substitute Basaglar or Semglee or other insulin glargine product per insurance preference at same dose and frequency.    What changed:   how much to take  additional instructions  Changed by: Francisca Hardy        28 Units         NovoLOG FLEXPEN 100 UNIT/ML soln  Inject 7 Units subcutaneously 3 times daily (with meals). And extra 1 unit every 50 mg/dL. Do not exceed 40 units per day.  Generic  drug: insulin aspart  What changed: additional instructions  Changed by: Francisca Hardy        7 Units     7 Units     7 Units             CONTINUE taking these medications          Morning Afternoon Evening Bedtime    blood glucose lancets standard  Also known as: NO BRAND SPECIFIED  Use to test blood sugar 3 times daily         Use to test blood sugar 3 times daily      blood glucose monitoring meter device kit  Also known as: NO BRAND SPECIFIED  Use to test blood sugar 3 times daily or as directed.         Use to test blood sugar 3 times daily or as directed.      blood glucose test strip  Also known as: NO BRAND SPECIFIED  Use to test blood sugar 3 times daily or as directed.         Use to test blood sugar 3 times daily or as directed.      hydrochlorothiazide 25 MG tablet  Also known as: HYDRODIURIL  Take 1 tablet (25 mg) by mouth daily.        1 tablet         lisinopril 20 MG tablet  Also known as: ZESTRIL  Take 1 tablet (20 mg) by mouth daily.        1 tablet         metoprolol succinate ER 25 MG 24 hr tablet  Also known as: TOPROL XL  Take 1 tablet (25 mg) by mouth daily.               rivaroxaban ANTICOAGULANT 20 MG Tabs tablet  Also known as: XARELTO  Take 1 tablet (20 mg) by mouth daily (with dinner).          1 tablet with food                Where to Get Your Medications        These medications were sent to [facility]  [place] - [address]  [address] [place] [address]      Phone: [phone]   atorvastatin 40 MG tablet  hydrochlorothiazide 25 MG tablet  insulin glargine 100 UNIT/ML pen  lisinopril 20 MG tablet  metoprolol succinate ER 25 MG 24 hr tablet  NovoLOG FLEXPEN 100 UNIT/ML soln  rivaroxaban ANTICOAGULANT 20 MG Tabs tablet         Follow-up:     My Clinical Pharmacist's contact information:                                                      Francisca Hardy, PharmD  Medication therapy management clinical pharmacist    It was great speaking with you  "today.  I value your experience and would be very thankful for your time in providing feedback in our clinic survey. In the next few days, you may receive an email or text message from [facility] with a link to a survey related to your  clinical pharmacist.\"     To schedule another MTM appointment, please call the clinic directly [phone] or you may call the MTM scheduling line at [phone].    "

## 2025-04-21 NOTE — TELEPHONE ENCOUNTER
Form placed in Provider basket for review and signature.    Missed Visit Report  Form:  [facility]   Fax : [phone]     Cate Sampson

## 2025-04-21 NOTE — TELEPHONE ENCOUNTER
Clinic Care Coordination Chart Review     Situation: Patient chart reviewed by care coordination leader.     Background: Primary Care Care Coordination referral entered by Inpatient Care Manager through IP to Amb CM handoff process.     Assessment: Patient does yet have an [facility] PCP on his care team therefore the handoff referral was captured by leadership for review.     Plan/Recommendations: Patient has a follow up appointment with an appropriate provider today for hospital discharge.  Transitional Care Managemetn episode was created however no post-discharge outreach call planned due to same-day appt.    Future Appointments   Date Time Provider Department Center   4/21/2025  9:30 AM Adrian Ellison PA-C [facility]   4/28/2025 12:20 PM Denisse Preciado DPM [facility]          YANELY Oliver, RN   Manager of Ambulatory Care Management  [facility]

## 2025-04-21 NOTE — TELEPHONE ENCOUNTER
Pt calls.    He is at home and he is trying to put the needle on his novolog pen.  He has done it before.  Last night and today was a struggle.  Last night he had problems with lantus.      He just got some new needles on Saturday.  He just got out of the hospital.  The cap is called BD ultra fine 5 mm needles.      Everything is new to him.  It sounds like he has been a diabetic for a while, but he has not been treating.      Talked to Adrian Ellison about seeing MTM for this.  She advised that she has already put in a referral for him.      Appt scheduled today with BERNIE Alvarez.  Reached out to her to see if ok as the pt is a new MTM and only 30 minute appt available.  She advised she would see him and try to get him started.  Pt advised and is agreeable to the plan.    Appointments in Next Year      Apr 21, 2025 9:30 AM  (Arrive by 9:10 AM)  New ED/Hospital Follow Up with MONE Callejas [facility] ([facility] ) [phone]     Apr 21, 2025 2:30 PM  MTM New with Francisca Hardy [facility] ([facility] ) [phone]     Apr 28, 2025 12:20 PM  Return Wound Provider with Deinsse Preciado DPM  [facility] [place] ([facility] ) [phone]     Oct 21, 2025 2:30 PM  (Arrive by 2:10 PM)  Annual Wellness Visit with Adrian Ellison PA-C  [facility] [place] ([facility] ) [phone]

## 2025-04-21 NOTE — PLAN OF CARE
"Physical Therapy Discharge Summary    Reason for therapy discharge:    Discharged to home.    Progress towards therapy goal(s). See goals on Care Plan in [facility] electronic health record for goal details.  Goals not met.  Barriers to achieving goals:   discharge from facility.    Therapy recommendation(s):    Continued therapy is recommended.  Rationale/Recommendations:  Per prior PT recommendation, \"Pt is near maximum potential given post-op precautions. Rec home once medically stable. Will need 2WW issued\".      "

## 2025-04-21 NOTE — PROGRESS NOTES
Medication Therapy Management (MTM) Encounter    ASSESSMENT:                            Medication Adherence/Access: Advised use of pill boxes to help med adherence.    Diabetes  A1c not at goal, new diagnosis. Continue on basal and mealtime insulin. Will recommend additional directions on insulin adjustment based on blood sugar reading. Would recommend a1c goal < 7% and aggressive control to assist with wound healing.  Due to time unable to review if other pharmacotherapy options other than insulin.  He would be a good candidate for continuous glucose monitor given on basal and mealtime insulin.    Hypertension and atrial flutter  Blood pressure at goal and on the low end. Would continue to monitor for hypotensive symptoms, could consider lower hydrochlorothiazide if symptomatic.      Hyperlipidemia   Repeat fasting lipid panel in 4-12 weeks since initiation. Goal to lower LDL < 70 and consider 50% reduction due to ASCVD risk > 20% (baseline  4/7/2025).    PLAN:                            Insulin pens:  Store extra in the fridge. The one you use will be good for 28 days  Remember to prime the pen by 2 units each time.    Increase Lantus to 28 units daily. Then increase every 4 days but 2 units until fasting blood sugar in the morning is below 130mg/dL.    Novolog continue on with 7 units. But you can add extra unit if the blood sugar is higher than 150mg/dL. Take this 15 minutes before eating.   <70 mg/dL:           take no insulin injections  70-99 mg/dL:        minus 2 units   100-150 mg/dL:    no adjustment = 7 units  150-199 mg/dL:    add 1 units (= 8 units)  200-249 mg/dL:    add 2 units  (= 9 units)  250-299 mg/dL:    add 3 units  (= 10 units)  >300 mg/dL:         add 4 units and call your doctor  (= 11 units)    Continuous glucose monitor dexcom g7 order. Sent Rx. If covered patient to bring to office visit for education/placement with [facility] KEARNEY    We discussed treating low blood sugar, < 70 with ~15g of  carb = 1/2c regular juice/pop    Follow-up: Return in about 3 weeks (around 2025) for Medication Therapy Management Visit.    SUBJECTIVE/OBJECTIVE:                          Justino Giordano is a 65 year old male seen for an initial visit. He was referred to me from Adrian lElison PA-C earlier today. Discharged from [facility] on 25. Seen by provider earlier today.    Reason for visit: help with insulin pen.    Allergies/ADRs: Reviewed in chart  Past Medical History: Reviewed in chart  Tobacco: He reports that he has never smoked. He has never been exposed to tobacco smoke. He has never used smokeless tobacco.  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    Diabetes   Lantus 25 units daily morning  Novolog 7 units 130-149 and 8 units if over 150 mg/dL     He reports overall feeling pretty good. He would like some help with insulin pens.   Diet/Exercise:   Use to only eat 2 meals a day but wondering if he should eat 3 meals.   Trying to be carbohydrate conscious.      Blood sugar monitorin-3 times a day before he eats; Ranges: (patient reported) 150-160's usually but today was 203.    Eye exam in the last 12 months? New diagnosis   Foot exam: new diagnosis has left foot gangrene, has wound vac. Home care RN for wound caring 2-3 times a week.  Lab Results   Component Value Date    A1C 12.3 2025    A1C 11.7 2021    A1C 6.6 2013       Hypertension and atrial flutter  Hydrochlorothiazide 25 mg daily   Lisinopril 20 mg daily   Metoprolol ER 25 mg daily   Xarelto 20 mg in the evening    Patient reports no current medication side effects  Denies any symptoms palpitation/flutter.   [id number] Score = 3                 Hyperlipidemia   Atorvastatin 40 mg daily     Patient reports no significant myalgias or other side effects.         Today's Vitals:  "  BP Readings from Last 1 Encounters:   25 109/83     Pulse Readings from Last 1 Encounters:   25 96     Wt Readings from Last 1 Encounters:   25 245 lb (111.1 kg)     Ht Readings from Last 1 Encounters:   25 6' 3\" (1.905 m)     Estimated body mass index is 30.62 kg/m  as calculated from the following:    Height as of an earlier encounter on 25: 6' 3\" (1.905 m).    Weight as of an earlier encounter on 25: 245 lb (111.1 kg).    Temp Readings from Last 1 Encounters:   25 97.9  F (36.6  C) (Oral)       ----------------  Post Discharge Medication Reconciliation Status: discharge medications reconciled and changed, per note/orders.    I spent 60 minutes with this patient today. All changes were made via collaborative practice agreement with Adrian Ellison PA-C.     A summary of these recommendations was given to the patient.    Francisca Hardy, PharmD  Medication Therapy Management Pharmacist       Medication Therapy Recommendations  Type 2 diabetes mellitus with diabetic peripheral angiopathy and gangrene, with long-term current use of insulin (H)   1 Current Medication: Continuous Glucose  (DEXCOM G7 ) ADALBERTO   Current Medication Si Device once for 1 dose.   Rationale: Synergistic therapy - Needs additional medication therapy - Indication   Recommendation: Start Medication   Status: Accepted per CPA   Identified Date: 2025 Completed Date: 2025         2 Current Medication: insulin glargine (LANTUS PEN) 100 UNIT/ML pen   Current Medication Sig: Inject 28 Units subcutaneously every morning (before breakfast). Plus 2 units for priming. Increase by 2 units every 4 days if fasting blood sugar over 130 mg/dL. Do not exceed 50 units per day.   Rationale: Dose too low - Dosage too low - Effectiveness   Recommendation: Increase Dose   Status: Accepted per CPA   Identified Date: 2025 Completed Date: 2025         Type 2 diabetes mellitus without " complication, with long-term current use of insulin (H)   1 Current Medication: insulin aspart (NOVOLOG FLEXPEN) 100 UNIT/ML pen   Current Medication Sig: Inject 7 Units subcutaneously 3 times daily (with meals). And extra 1 unit every 50 mg/dL. Do not exceed 40 units per day.   Rationale: Dose too low - Dosage too low - Effectiveness   Recommendation: Increase Dose   Status: Accepted per CPA   Identified Date: 4/21/2025 Completed Date: 4/21/2025

## 2025-04-22 ENCOUNTER — TELEPHONE (OUTPATIENT)
Dept: WOUND CARE | Facility: CLINIC | Age: 65
End: 2025-04-22
Payer: MEDICARE

## 2025-04-22 LAB
PATH REPORT.COMMENTS IMP SPEC: NORMAL
PATH REPORT.COMMENTS IMP SPEC: NORMAL
PATH REPORT.FINAL DX SPEC: NORMAL
PATH REPORT.GROSS SPEC: NORMAL
PATH REPORT.MICROSCOPIC SPEC OTHER STN: NORMAL
PATH REPORT.RELEVANT HX SPEC: NORMAL
PHOTO IMAGE: NORMAL

## 2025-04-22 PROCEDURE — 88307 TISSUE EXAM BY PATHOLOGIST: CPT | Mod: 26 | Performed by: PATHOLOGY

## 2025-04-22 PROCEDURE — 88311 DECALCIFY TISSUE: CPT | Mod: 26 | Performed by: PATHOLOGY

## 2025-04-22 RX ORDER — ACYCLOVIR 400 MG/1
1 TABLET ORAL ONCE
Qty: 1 EACH | Refills: 0 | Status: SHIPPED | OUTPATIENT
Start: 2025-04-22 | End: 2025-04-22

## 2025-04-22 RX ORDER — ACYCLOVIR 400 MG/1
1 TABLET ORAL
Qty: 3 EACH | Refills: 5 | Status: SHIPPED | OUTPATIENT
Start: 2025-04-22

## 2025-04-22 NOTE — TELEPHONE ENCOUNTER
Home care nurse, Tiffanie, saw the patient today to begin care. She was able to remove the wound vac, but it was not functioning at the start of the visit. She states that the skin was very macerated and tearing on the periwound and the underside of the foot. She also states that there was purulent drainage but not other signs of infection. She was able to cleanse the wound with Vashe, reapplied the wound vac and go through some education with the patient.     Tiffanie did not request a call back but if needed, she can be reached at [phone]

## 2025-04-23 ENCOUNTER — TELEPHONE (OUTPATIENT)
Dept: FAMILY MEDICINE | Facility: CLINIC | Age: 65
End: 2025-04-23
Payer: MEDICARE

## 2025-04-23 NOTE — TELEPHONE ENCOUNTER
Yes, that is fine with me.  Orders are ok.  Have they gone out there  yet?  He was having trouble with the wound vacc and I was hoping he would be seen at home soon for help with this.      Adrian

## 2025-04-23 NOTE — TELEPHONE ENCOUNTER
Form signed by Provider and faxed.     Form:  [facility]  Missed Visit Report      Fax : [phone]     Cate Sampson

## 2025-04-23 NOTE — TELEPHONE ENCOUNTER
Shital calling back. States that pt was seen by nursing for admission the other day and they changed out the wound vac. Pt has also been in contact with the nurses through phone calls to answer any wound vac questions.    Claudia Jean RN on 4/23/2025 at 12:10 PM

## 2025-04-23 NOTE — TELEPHONE ENCOUNTER
Received a call from Yecenia, Home Care Coordinator from [facility] [phone].      Home Care is calling regarding an established patient with [facility].  Requesting orders from:  Adrian Ellison  OTHER ACTION: Will forward to Adrian Ellison to see if she will follow the pt.  She said that the hospital gave the home care order.  I also see a home care referrral from Dr. Pagan, so will double check with Adrian.    Is this a request for a temporary pause in the home care episode?  No    Orders Requested    Skilled Nursing  Request for initial certification (first set of orders)   Frequency: 2 times a week for 1 week and then 3 times a week for 8 weeks  RN gave verbal order: No:         Phone number Home Care can be reached at: see above.  Okay to leave a detailed message?:  yes      Kita Mariano, RN

## 2025-04-23 NOTE — TELEPHONE ENCOUNTER
Called Yecenia and left detailed VM relaying info below. Asked if they have seen pt yet, if not, what date they will start. Will await call back and route back to Adrian.     YANELY Godoy, RN     [facility]    04/23/2025 at 11:56 AM

## 2025-04-28 ENCOUNTER — HOSPITAL ENCOUNTER (OUTPATIENT)
Dept: WOUND CARE | Facility: CLINIC | Age: 65
Discharge: HOME | End: 2025-04-28
Attending: PODIATRIST | Admitting: PODIATRIST
Payer: MEDICARE

## 2025-04-28 VITALS — SYSTOLIC BLOOD PRESSURE: 93 MMHG | TEMPERATURE: 96.9 F | DIASTOLIC BLOOD PRESSURE: 70 MMHG | HEART RATE: 86 BPM

## 2025-04-28 DIAGNOSIS — L97.529 NON-HEALING ULCER OF LEFT FOOT, UNSPECIFIED ULCER STAGE (H): Primary | ICD-10-CM

## 2025-04-28 PROBLEM — E11.621: Status: ACTIVE | Noted: 2025-04-07

## 2025-04-28 PROBLEM — L97.525: Status: ACTIVE | Noted: 2025-04-07

## 2025-04-28 PROCEDURE — G0463 HOSPITAL OUTPT CLINIC VISIT: HCPCS | Mod: 25

## 2025-04-28 PROCEDURE — 11042 DBRDMT SUBQ TIS 1ST 20SQCM/<: CPT | Performed by: PODIATRIST

## 2025-04-28 NOTE — PROGRESS NOTES
[facility] Wound Healing [place] Progress Note    Subject: Patient was seen at wound center for follow up for his left foot. 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 Has now been discharged with a wound vac and is home. Denies pain. States feels well. Is following up with several doctors now, his PCP, a cardiologist, a diabetes educator and a pharmacist. Wonders if he should be on antibiotics. Denies N/F/V/C/D.     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 [employer]   Tobacco Use    Smoking status: Never     Passive exposure: Never    Smokeless tobacco: Never   Vaping Use    Vaping status: Never Used   Substance and Sexual Activity    Alcohol use: No    Drug use: No    Sexual activity: Not Currently     Partners: Female   Other Topics Concern     Service Not Asked    Blood Transfusions Not Asked    Caffeine Concern Not Asked    Occupational Exposure Not Asked    Hobby Hazards Not Asked    Sleep Concern Not Asked    Stress Concern Not Asked    Weight Concern Not Asked    Special Diet Not Asked    Back Care Not Asked    Exercise Not Asked    Bike Helmet Not Asked    Seat Belt Yes    Self-Exams Not Asked    Parent/sibling w/ CABG, MI or angioplasty before 65F 55M? Not Asked   Social History Narrative    Not on file     Social Drivers of Health     Financial Resource Strain: Low Risk  (4/21/2025)    Financial Resource Strain     Within the past 12  months, have you or your family members you live with been unable to get utilities (heat, electricity) when it was really needed?: No   Food Insecurity: Low Risk  (4/21/2025)    Food Insecurity     Within the past 12 months, did you worry that your food would run out before you got money to buy more?: No     Within the past 12 months, did the food you bought just not last and you didn t have money to get more?: No   Transportation Needs: Low Risk  (4/21/2025)    Transportation Needs     Within the past 12 months, has lack of transportation kept you from medical appointments, getting your medicines, non-medical meetings or appointments, work, or from getting things that you need?: No   Physical Activity: Not on file   Stress: Not on file   Social Connections: Not on file   Interpersonal Safety: Low Risk  (4/28/2025)    Interpersonal Safety     Do you feel physically and emotionally safe where you currently live?: Yes     Within the past 12 months, have you been hit, slapped, kicked or otherwise physically hurt by someone?: No     Within the past 12 months, have you been humiliated or emotionally abused in other ways by your partner or ex-partner?: No   Housing Stability: Low Risk  (4/21/2025)    Housing Stability     Do you have housing? : Yes     Are you worried about losing your housing?: No       Surgical Hx:   Past Surgical History:   Procedure Laterality Date    AMPUTATE TOE(S) Left 4/8/2025    Procedure: Left foot fifth ray resection, left foot excisional debridement down to and including tendon for site measuring 6 cm x 4 cm x 2 cm;  Surgeon: Denisse Preciado DPM;  Location:  OR    AMPUTATE TOE(S) Left 4/17/2025    Procedure: Repeat incision and debridement left foot wound, left fifth metatarsal bone excision;  Surgeon: Bria Pagan DPM, Podiatry/Foot and Ankle Surgery;  Location:  OR    COLONOSCOPY  8/27/2013    Procedure: COLONOSCOPY;  Colonoscopy ;  Surgeon: Ash Brasher MD;  Location:   GI    HC KNEE SCOPE,REMV LOOSE BODY      right knee, left knee scoped    HERNIORRHAPHY EPIGASTRIC  12/12/2012    Procedure: HERNIORRHAPHY EPIGASTRIC;  Epigastric Hernia repair with Mesh ;  Surgeon: Viv Alcantara DO;  Location: [facility] OR    IRRIGATION AND DEBRIDEMENT FOOT, COMBINED Left 4/11/2025    Procedure: Left foot fifth metatarsal resection, left foot excisional debridement down to and including tendon, site measuring 7 cm x 4 cm x 2 cm;  Surgeon: Denisse Preciado DPM;  Location: [facility] OR       Allergies:  No Known Allergies    Medications:   Current Outpatient Medications   Medication Sig Dispense Refill    amoxicillin-clavulanate (AUGMENTIN) 875-125 MG tablet Take 1 tablet by mouth 2 times daily for 10 days. 20 tablet 0    atorvastatin (LIPITOR) 40 MG tablet Take 1 tablet (40 mg) by mouth daily. 90 tablet 0    blood glucose (NO BRAND SPECIFIED) lancets standard Use to test blood sugar 3 times daily 100 Lancet 0    blood glucose (NO BRAND SPECIFIED) test strip Use to test blood sugar 3 times daily or as directed. 100 strip 0    blood glucose monitoring (NO BRAND SPECIFIED) meter device kit Use to test blood sugar 3 times daily or as directed. 1 kit 0    Continuous Glucose Sensor (DEXCOM G7 SENSOR) MISC 1 Device every 10 days. 3 each 5    hydrochlorothiazide (HYDRODIURIL) 25 MG tablet Take 1 tablet (25 mg) by mouth daily. 90 tablet 0    insulin aspart (NOVOLOG FLEXPEN) 100 UNIT/ML pen Inject 7 Units subcutaneously 3 times daily (with meals). And extra 1 unit every 50 mg/dL. Do not exceed 40 units per day. 30 mL 0    insulin glargine (LANTUS PEN) 100 UNIT/ML pen Inject 28 Units subcutaneously every morning (before breakfast). Plus 2 units for priming. Increase by 2 units every 4 days if fasting blood sugar over 130 mg/dL. Do not exceed 50 units per day. 60 mL 0    lisinopril (ZESTRIL) 20 MG tablet Take 1 tablet (20 mg) by mouth daily. 90 tablet 0    metoprolol succinate ER (TOPROL XL) 25 MG 24 hr tablet Take 1  tablet (25 mg) by mouth daily. 90 tablet 0    rivaroxaban ANTICOAGULANT (XARELTO) 20 MG TABS tablet Take 1 tablet (20 mg) by mouth daily (with dinner). 90 tablet 0     No current facility-administered medications for this encounter.         Objective:  Vitals:  BP 93/70 (BP Location: Left arm, Patient Position: Chair, Cuff Size: Adult Regular)   Pulse 86   Temp 96.9  F (36.1  C) (Temporal)     A1C: 12.3    General:  Patient is alert and orientated.  NAD     Dermatologic: Left foot: large dorsal ulcer with exposed tendons. Noted nonviable and necrotic tissue, mostly to the fourth metatarsal site. Periwound with some erythema. Fourth digit with some erythema. No ascending or significant signs of cellulitis.     .   Negative Pressure Wound Therapy Foot Left;Lateral;Anterior (Active)   Wound Type Surgical 04/19/25 0900   Dressing Pieces Applied (# of Each Type) 1;Black foam;Non-adherent contact layer 04/19/25 0900   Cycle Continuous 04/19/25 0900   Target Pressure (mmHg) 125 04/19/25 0900   Cannister changed? No 04/19/25 0900       Wound (used by OP [facility] only) 04/28/25 1220 foot left diabetic ulcer (Active)   Thickness/Stage full thickness 04/28/25 1221   Base exposed structure;granulating;slough 04/28/25 1221   Periwound macerated 04/28/25 1221   Periwound Temperature warm 04/28/25 1221   Periwound Skin Turgor soft 04/28/25 1221   Edges open 04/28/25 1221   Length (cm) 10.4 04/28/25 1221   Width (cm) 9.5 04/28/25 1221   Depth (cm) 0.8 04/28/25 1221   Wound (cm^2) 98.8 cm^2 04/28/25 1221   Wound Volume (cm^3) 79.04 cm^3 04/28/25 1221   Drainage Characteristics/Odor serosanguineous 04/28/25 1221   Drainage Amount moderate 04/28/25 1221   Care, Wound debrided 04/28/25 1221       Incision/Surgical Site 12/12/12 Midline Epigastrum (Active)       Incision/Surgical Site 04/17/25 Anterior;Left Foot (Active)   Incision Assessment UTV 04/19/25 0900   Dressing Vacuum based 04/19/25 0900   Closure GARRETT 04/18/25 2017   Incision  Drainage Amount Small 04/19/25 0900   Drainage Description Serosanguinous 04/19/25 0900   Dressing Intervention Clean, dry, intact 04/19/25 0900          Vascular: DP & PT pulses are intact & regular bilaterally.  No significant edema or varicosities noted.  CFT and skin temperature is normal to both lower extremities.     Neurologic: Lower extremity sensation is intact to light touch.  No evidence of weakness or contracture in the lower extremities.  No evidence of neuropathy.     Musculoskeletal: Patient is ambulatory without assistive device or brace.  No gross ankle deformity noted.  No foot or ankle joint effusion is noted.    Imaging:    IMPRESSION: Interval, now complete amputation of the fifth metatarsal. Postoperative soft tissue gas about the left lateral foot. No acute fracture. Mild scattered degenerative arthritis. Plantar calcaneal spur. Arterial calcifications     Cultures:  1+ Fusobacterium species Abnormal       Pathology (4/8/2025):      Final Diagnosis   Toe, left, fifth ray, resection-  Epidermal ulceration with associated acute osteomyelitis (see description)  Resection margin free of osteomyelitis  No evidence of malignancy      Pathology 4/11  Final Diagnosis   Bone, left foot, fifth metatarsal, biopsy-  Microscopic foci compatible with acute osteomyelitis       Assessment:     Left foot ulcer, s/p fifth metatarsal resection and several debridements, as treatment for gas gangrene   Diabetes Mellitus --> hba1c: 12.3    Plan:    -Discussed all findings with patient. Chart and imaging reviewed.     -Excisional debridement as below, down to and including subcu.     -Discussed with patient at length. Some areas of wound bed are improved, dorsally. But concerning area to fourth metatarsal phalangeal joint with areas of close to exposed bone. Discussed with patient this concern and if that area becomes worse, may need further surgery, including a TMA. Patient hopes to avoid this. But discussed with  him that resecting the 4th met alone and leaving him with only 3 would put him at a high liklihood of having ulcer recurrence.     -Cultures reviewed. Augmentin rx provided.     -Given amount of maceration to the wound edges. Will have wound vac reapplied on Wednesday. Vasche dressing today.     -WB to heel only in CAM boot.     -Follow up in 1 week for close monitoring.     Procedure:  After verbal consent, per protocol lidocaine was applied to the wound. Excisional debridement was performed on ulcer.   #15 blade was used to debride ulcer down to and including subcutaneous tissue, on the left foot. Bleeding controlled with light pressure.  No drainage noted.  Debrided site measured 5 cm x 5 cm x .7 cm > 20sq cm debrided.  Dry dressing applied to foot.  Patient tolerated procedure well.    Denisse Preciado DPM                Further instructions from your care team         04/28/2025   Justino Giordano   1960    A DME order was not completed because the supplies are ordered by home care or at a care facility    Dressing changes outside of clinic are being performed by Home Care      [facility] Phone [phone] Fax [phone]     Plan 04/28/2025   -wear your walking boot for all standing and walking; essential walking and standing only  -prevent pressure to this wound; floating foot and heel is recommended  -until Wednesday, daily and PRN dressing change to left foot as below; on Wednesday 4/30/25 home care will resume the VAC dressing as below; if home care can come tomorrow and do dressing as below and again on Wed to resume VAC this would be ideal  -start oral antibiotic as prescribed  -return here next week as scheduled  -do not get foot wet in shower; use cast protector or sponge bath    From today until Wednesday 4/30/25, do wound care as below:  Wound Dressing Change: left foot   - Wash your hands with soap and water before you begin your dressing change and prepare a clean surface for  dressings.  - Cleanse with mild unscented soap (such as Cetaphil, Cerave or Dove) and water  - paint perimeter of wound with Betadine solution and allow to dry  - Apply small amount of VASHE on roll gauze, lay into wound bed  - cover with ABD pad and secure with roll gauze and tape  - wear your walking boot at all times for standing and essential walking only  Change dressing Daily and as needed for soilage/leakage    ON Wednesday 4/30/25, resume the VAC dressing as below:  Wound dressing: left foot  Remove old dressing and ensure all black foam is removed  Cleanse wound with Vashe moist gauze, leave in place for 10 minutes; remove   Cleanse periwound skin with wound spray, pat dry and apply No Sting Barrier film.  Cut Black Foam to fill the depth of wound but please ensure it is not overstuffed.  Cover wound with VAC dressing tape to seal the foam, cut appropriate size opening for the TRAC pad.  Connect TRAC pad and connect to pump; NPWT -125 mmHg Continuous, ensure no leaks  Change canister when alarms full or weekly  Change dressing three times a week    Document on the outside of the dressing the number of sponges used and the date of the dressing  If dressing is compromised for greater than 2 hours then please remove entire dressing and change or tuck lightly moistened Vashe gauze into wound until new dressing can be applied.    May use ostomy paste for divots and crevices as needed to maintain seal.       You do not need to change the dressing on the days you are being seen at the wound clinic    A diet high in protein is important for wound healing, we recommend getting 90 grams of protein per day unless medically contraindicated. Taking protein shakes or bars are a good way to get extra protein in your diet.     Good sources of protein:  Pork 26g per 3 oz  Whey protein powder - 24g per scoop (on average)  Greek yogurt - 23g per 8oz   Chicken or Turkey - 23g per 3oz  Fish - 20-25g per 3oz  Beef - 18-23g per  3oz  Tofu - 10g per 1/2 cup  Navy beans - 20g per cup  Cottage cheese - 14g per 1/2 cup   Lentils - 13g per 1/4 cup  Beef jerky 13g per 1oz  2% milk - 8g per cup  Peanut butter - 8g per 2 tablespoons  Eggs - 6g per egg  Mixed nuts - 6g per 2oz      Main Provider: Denisse Preciado D.P.M. April 28, 2025    Call us at [phone] if you have any questions about your wounds, if you have redness or swelling around your wound, have a fever of 101 degrees Fahrenheit or greater or if you have any other problems or concerns. We answer the phone Monday through Friday 8 am to 4 pm, please leave a message as we check the voicemail frequently throughout the day. If you have a concern over the weekend, please leave a message and we will return your call Monday. If the need is urgent, go to the ER or urgent care.    If you had a positive experience please indicate that on your patient satisfaction survey form that [facility] will be sending you.    It was a pleasure meeting with you today.  Thank you for allowing me and my team the privilege of caring for you today.  YOU are the reason we are here, and I truly hope we provided you with the excellent service you deserve.  Please let us know if there is anything else we can do for you so that we can be sure you are leaving completely satisfied with your care experience.      If you have any billing related questions please call the [facility] Business office at [phone]. The clinic staff does not handle billing related matters.    If you are scheduled to have a follow up appointment, you will receive a reminder call the day before your visit. On the appointment day please arrive 15 minutes prior to your appointment time. If you are unable to keep that appointment, please call the clinic to cancel or reschedule. If you are more than 10 minutes late or greater for your scheduled appointment time, the clinic policy is that you may be asked to reschedule.

## 2025-04-28 NOTE — PROGRESS NOTES
Patient arrived for wound care visit. Certified Wound Care Nurse time spent evaluating patient record, completed a full evaluation and documented wound(s) & isra-wound skin; provided recommendation based on treatment plan. Applied dressing, reviewed discharge instructions, patient education, and discussed plan of care with appropriate medical team staff members and patient and/or family members.

## 2025-04-28 NOTE — DISCHARGE INSTRUCTIONS
04/28/2025   Justino Giordano   1960    A DME order was not completed because the supplies are ordered by home care or at a care facility    Dressing changes outside of clinic are being performed by Home Care      [facility] Phone [phone] Fax [phone]     Plan 04/28/2025   -wear your walking boot for all standing and walking; essential walking and standing only  -prevent pressure to this wound; floating foot and heel is recommended  -until Wednesday, daily and PRN dressing change to left foot as below; on Wednesday 4/30/25 home care will resume the VAC dressing as below; if home care can come tomorrow and do dressing as below and again on Wed to resume VAC this would be ideal  -start oral antibiotic as prescribed  -return here next week as scheduled  -do not get foot wet in shower; use cast protector or sponge bath    From today until Wednesday 4/30/25, do wound care as below:  Wound Dressing Change: left foot   - Wash your hands with soap and water before you begin your dressing change and prepare a clean surface for dressings.  - Cleanse with mild unscented soap (such as Cetaphil, Cerave or Dove) and water  - paint perimeter of wound with Betadine solution and allow to dry  - Apply small amount of VASHE on roll gauze, lay into wound bed  - cover with ABD pad and secure with roll gauze and tape  - wear your walking boot at all times for standing and essential walking only  Change dressing Daily and as needed for soilage/leakage    ON Wednesday 4/30/25, resume the VAC dressing as below:  Wound dressing: left foot  Remove old dressing and ensure all black foam is removed  Cleanse wound with Vashe moist gauze, leave in place for 10 minutes; remove   Cleanse periwound skin with wound spray, pat dry and apply No Sting Barrier film.  Cut Black Foam to fill the depth of wound but please ensure it is not overstuffed.  Cover wound with VAC dressing tape to seal the foam, cut appropriate size opening  for the TRAC pad.  Connect TRAC pad and connect to pump; NPWT -125 mmHg Continuous, ensure no leaks  Change canister when alarms full or weekly  Change dressing three times a week    Document on the outside of the dressing the number of sponges used and the date of the dressing  If dressing is compromised for greater than 2 hours then please remove entire dressing and change or tuck lightly moistened Vashe gauze into wound until new dressing can be applied.    May use ostomy paste for divots and crevices as needed to maintain seal.       You do not need to change the dressing on the days you are being seen at the wound clinic    A diet high in protein is important for wound healing, we recommend getting 90 grams of protein per day unless medically contraindicated. Taking protein shakes or bars are a good way to get extra protein in your diet.     Good sources of protein:  Pork 26g per 3 oz  Whey protein powder - 24g per scoop (on average)  Greek yogurt - 23g per 8oz   Chicken or Turkey - 23g per 3oz  Fish - 20-25g per 3oz  Beef - 18-23g per 3oz  Tofu - 10g per 1/2 cup  Navy beans - 20g per cup  Cottage cheese - 14g per 1/2 cup   Lentils - 13g per 1/4 cup  Beef jerky 13g per 1oz  2% milk - 8g per cup  Peanut butter - 8g per 2 tablespoons  Eggs - 6g per egg  Mixed nuts - 6g per 2oz      Main Provider: Denisse Preciado D.P.M. April 28, 2025    Call us at [phone] if you have any questions about your wounds, if you have redness or swelling around your wound, have a fever of 101 degrees Fahrenheit or greater or if you have any other problems or concerns. We answer the phone Monday through Friday 8 am to 4 pm, please leave a message as we check the voicemail frequently throughout the day. If you have a concern over the weekend, please leave a message and we will return your call Monday. If the need is urgent, go to the ER or urgent care.    If you had a positive experience please indicate that on your patient  satisfaction survey form that [facility] will be sending you.    It was a pleasure meeting with you today.  Thank you for allowing me and my team the privilege of caring for you today.  YOU are the reason we are here, and I truly hope we provided you with the excellent service you deserve.  Please let us know if there is anything else we can do for you so that we can be sure you are leaving completely satisfied with your care experience.      If you have any billing related questions please call the [facility] Business office at [phone]. The clinic staff does not handle billing related matters.    If you are scheduled to have a follow up appointment, you will receive a reminder call the day before your visit. On the appointment day please arrive 15 minutes prior to your appointment time. If you are unable to keep that appointment, please call the clinic to cancel or reschedule. If you are more than 10 minutes late or greater for your scheduled appointment time, the clinic policy is that you may be asked to reschedule.

## 2025-05-01 ENCOUNTER — TELEPHONE (OUTPATIENT)
Dept: FAMILY MEDICINE | Facility: CLINIC | Age: 65
End: 2025-05-01
Payer: MEDICARE

## 2025-05-01 ENCOUNTER — MYC MEDICAL ADVICE (OUTPATIENT)
Dept: EDUCATION SERVICES | Facility: CLINIC | Age: 65
End: 2025-05-01
Payer: MEDICARE

## 2025-05-01 NOTE — TELEPHONE ENCOUNTER
Forms/Letter Request    Type of form/letter: OTHER: Home Health Care Certification and POC       Do we have the form/letter: Yes:  basket    Who is the form from? [facility]    Where did/will the form come from? form was faxed in    When is form/letter needed by: ASAP    How would you like the form/letter returned: Fax : [phone]    Arleen Licona Patient Rep.

## 2025-05-01 NOTE — TELEPHONE ENCOUNTER
Reviewed chart for next week. Pt is NOT a new diagnosis and therefore was scheduled incorrectly in a 30 minute slot. He needs to be rescheduled for 60 minute slot with diabetes education. Recommend he be seen by the end of next week. There are openings within the diabetes educators schedules. Does not need to be with me.     Sent pt Mychart and left VM.    Thanks!  Abbi Andrew RD, LD, CDCES

## 2025-05-01 NOTE — TELEPHONE ENCOUNTER
Pt called in to reschedule appt. Pt states he just got diagnosed with diabetes 4 weeks ago and this would be a new diagnosis to him. Writer was unsure what to do regarding scheduling so appointment was left as is for now. Please advise.

## 2025-05-05 ENCOUNTER — HOSPITAL ENCOUNTER (OUTPATIENT)
Dept: WOUND CARE | Facility: CLINIC | Age: 65
Discharge: HOME | End: 2025-05-05
Attending: PODIATRIST | Admitting: PODIATRIST
Payer: MEDICARE

## 2025-05-05 VITALS — HEART RATE: 75 BPM | SYSTOLIC BLOOD PRESSURE: 98 MMHG | TEMPERATURE: 97.1 F | DIASTOLIC BLOOD PRESSURE: 68 MMHG

## 2025-05-05 DIAGNOSIS — E11.621: Primary | ICD-10-CM

## 2025-05-05 DIAGNOSIS — L97.525: Primary | ICD-10-CM

## 2025-05-05 DIAGNOSIS — L97.523 CHRONIC FOOT ULCER, LEFT, WITH NECROSIS OF MUSCLE (H): ICD-10-CM

## 2025-05-05 DIAGNOSIS — E11.52 TYPE 2 DIABETES MELLITUS WITH DIABETIC PERIPHERAL ANGIOPATHY AND GANGRENE, WITH LONG-TERM CURRENT USE OF INSULIN (H): ICD-10-CM

## 2025-05-05 DIAGNOSIS — Z79.4 TYPE 2 DIABETES MELLITUS WITH DIABETIC PERIPHERAL ANGIOPATHY AND GANGRENE, WITH LONG-TERM CURRENT USE OF INSULIN (H): ICD-10-CM

## 2025-05-05 DIAGNOSIS — R22.42 LOCALIZED SWELLING OF LEFT FOOT: ICD-10-CM

## 2025-05-05 DIAGNOSIS — A48.0 GAS GANGRENE OF FOOT (H): ICD-10-CM

## 2025-05-05 PROCEDURE — 11042 DBRDMT SUBQ TIS 1ST 20SQCM/<: CPT | Performed by: PODIATRIST

## 2025-05-05 NOTE — LETTER
[facility] [place]  [address]  [address]  [place] [address]  Phone: [phone]  Fax: [phone]    May 5, 2025        Justino Giordano  [address] [facility] [address]          To whom it may concern:    RE: Justino Giordano    Patient was seen and treated today at our clinic. He will need ongoing time off work for treatment for his left foot, following several surgeries. He was hospitalized on 4/7/25 and has been followed since then. It's expected he'll need to be off of work for approx 2 months. He'll be followed through that time and receive further documentation on approx 7/5/25. Please excuse him during that time.     Please contact me for questions or concerns.      Sincerely,      Denisse Preciado DPM

## 2025-05-05 NOTE — DISCHARGE INSTRUCTIONS
05/05/2025   Justino Giordano   1960    A DME order was not completed because the supplies are ordered by home care or at a care facility  Dressing changes outside of clinic are being performed by Home Care (3 times weekly)  [facility] Phone [phone] Fax [phone]     Plan 05/05/2025   Bone is exposed and not improving  Please call the scheduling  to schedule your left foot xray appointment at [facility]: [phone]   Referral  Referral placed per your request to get an additional opinion at the [facility]   Dr. Josh Gil #[phone] --- Fax#[phone]  We will send you with a work restriction letter for work today   Complete entire oral antibiotic course  Do not get foot wet; use cast protector or sponge bath  Mobility:  Limit standing and walking; take pressure off of wound as much as possible   When walking or standing, wear your walking boot      Wound dressing: Left foot  -Remove old dressing and ensure all black foam is removed  -Cleanse wound with Vashe moist gauze, leave in place for 10 minutes; remove   -Cleanse periwound skin with wound spray, pat dry and apply No Sting Barrier film.  -Cut Black Foam to fill the depth of wound but please ensure it is not overstuffed.  -Cover wound with VAC dressing tape to seal the foam, cut appropriate size opening for the TRAC pad.  -Connect TRAC pad and connect to pump; NPWT -125 mmHg Continuous, ensure no leaks  Change canister when alarms full or weekly  Change dressing three times a week  Document on the outside of the dressing the number of sponges used and the date of the dressing  If dressing is compromised for greater than 2 hours then please remove entire dressing and change or tuck moist Vashe gauze into wound until new dressing can be applied.  May use ostomy paste for divots and crevices as needed to maintain seal.     Alternative Dressing if Vac has to be removed during weekend or when home care  unable to come  Wound dressing: Left foot  - Remove old dressing and ensure all black foam is removed  - Apply thin layer of zinc oxide around wound    - Apply VASHE moistened gauze to wound bed  - Cover with absorbant pad   - Secure with roll gauze and tape (no tape to skin)  Change dressing daily and as needed if dressing becomes soiled or dislodged     Protein:  A diet high in protein is important for wound healing, we recommend getting 90 grams of protein per day unless medically contraindicated. Taking protein shakes or bars are a good way to get extra protein in your diet.        Main Provider: Denisse Preciado D.P.M. May 5, 2025    Call us at [phone] if you have any questions about your wounds, if you have redness or swelling around your wound, have a fever of 101 degrees Fahrenheit or greater or if you have any other problems or concerns. We answer the phone Monday through Friday 8 am to 4 pm, please leave a message as we check the voicemail frequently throughout the day. If you have a concern over the weekend, please leave a message and we will return your call Monday. If the need is urgent, go to the ER or urgent care.    If you had a positive experience please indicate that on your patient satisfaction survey form that [facility] will be sending you.    It was a pleasure meeting with you today.  Thank you for allowing me and my team the privilege of caring for you today.  YOU are the reason we are here, and I truly hope we provided you with the excellent service you deserve.  Please let us know if there is anything else we can do for you so that we can be sure you are leaving completely satisfied with your care experience.      If you have any billing related questions please call the [facility] Business office at [phone]. The clinic staff does not handle billing related matters.    If you are scheduled to have a follow up appointment, you will receive a reminder call the day before your  visit. On the appointment day please arrive 15 minutes prior to your appointment time. If you are unable to keep that appointment, please call the clinic to cancel or reschedule. If you are more than 10 minutes late or greater for your scheduled appointment time, the clinic policy is that you may be asked to reschedule.

## 2025-05-05 NOTE — PROGRESS NOTES
[facility] [place] Progress Note    Subject: Patient was seen at wound center for follow up for his left foot. 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 Has now been discharged with a wound vac and is home. Denies pain. States feels well. Is following up with several doctors now, his PCP, a cardiologist, a diabetes educator and a pharmacist. Wonders if he should be on antibiotics. Denies N/F/V/C/D.     5/5: Follows up for his left foot. States feels well and overall is doing well. State blood sugar and pressure are much improved. Has issues with wound vac over the weekend, believes it's related to walking and going the extra day without home health. Otherwise no complaints. Does ask about a work note and a referral for another opinion to [place].        PMH:   Past Medical History:   Diagnosis Date    Diabetes (H)     Elevated blood pressure reading without diagnosis of hypertension     Epigastric hernia     Other and unspecified hyperlipidemia     Type 2 diabetes mellitus without complications (H) 10/21/2015       Social Hx:   Social History     Socioeconomic History    Marital status: Single     Spouse name: Marlene    Number of children: 0    Years of education: Not on file    Highest education level: Not on file   Occupational History     Comment: [employer] for [employer]   Tobacco Use    Smoking status: Never     Passive exposure: Never    Smokeless tobacco: Never   Vaping Use    Vaping status: Never Used   Substance and Sexual Activity    Alcohol use: No    Drug use: No    Sexual activity: Not Currently     Partners: Female   Other Topics Concern     Service Not Asked    Blood Transfusions Not Asked    Caffeine Concern Not Asked    Occupational Exposure Not Asked    Hobby Hazards Not Asked    Sleep Concern Not Asked    Stress Concern Not Asked    Weight Concern Not Asked    Special Diet Not Asked     Back Care Not Asked    Exercise Not Asked    Bike Helmet Not Asked    Seat Belt Yes    Self-Exams Not Asked    Parent/sibling w/ CABG, MI or angioplasty before 65F 55M? Not Asked   Social History Narrative    Not on file     Social Drivers of Health     Financial Resource Strain: Low Risk  (4/21/2025)    Financial Resource Strain     Within the past 12 months, have you or your family members you live with been unable to get utilities (heat, electricity) when it was really needed?: No   Food Insecurity: Low Risk  (4/21/2025)    Food Insecurity     Within the past 12 months, did you worry that your food would run out before you got money to buy more?: No     Within the past 12 months, did the food you bought just not last and you didn t have money to get more?: No   Transportation Needs: Low Risk  (4/21/2025)    Transportation Needs     Within the past 12 months, has lack of transportation kept you from medical appointments, getting your medicines, non-medical meetings or appointments, work, or from getting things that you need?: No   Physical Activity: Not on file   Stress: Not on file   Social Connections: Not on file   Interpersonal Safety: Low Risk  (5/5/2025)    Interpersonal Safety     Do you feel physically and emotionally safe where you currently live?: Yes     Within the past 12 months, have you been hit, slapped, kicked or otherwise physically hurt by someone?: No     Within the past 12 months, have you been humiliated or emotionally abused in other ways by your partner or ex-partner?: No   Housing Stability: Low Risk  (4/21/2025)    Housing Stability     Do you have housing? : Yes     Are you worried about losing your housing?: No       Surgical Hx:   Past Surgical History:   Procedure Laterality Date    AMPUTATE TOE(S) Left 4/8/2025    Procedure: Left foot fifth ray resection, left foot excisional debridement down to and including tendon for site measuring 6 cm x 4 cm x 2 cm;  Surgeon: Denisse Prceiado  VÍCTOR;  Location: [facility] OR    AMPUTATE TOE(S) Left 4/17/2025    Procedure: Repeat incision and debridement left foot wound, left fifth metatarsal bone excision;  Surgeon: Bria Pagan DPM, Podiatry/Foot and Ankle Surgery;  Location: [facility] OR    COLONOSCOPY  8/27/2013    Procedure: COLONOSCOPY;  Colonoscopy ;  Surgeon: Ash Brasher MD;  Location: [facility] GI    HC KNEE SCOPE,REMV LOOSE BODY      right knee, left knee scoped    HERNIORRHAPHY EPIGASTRIC  12/12/2012    Procedure: HERNIORRHAPHY EPIGASTRIC;  Epigastric Hernia repair with Mesh ;  Surgeon: Viv Alcantara DO;  Location: [facility] OR    IRRIGATION AND DEBRIDEMENT FOOT, COMBINED Left 4/11/2025    Procedure: Left foot fifth metatarsal resection, left foot excisional debridement down to and including tendon, site measuring 7 cm x 4 cm x 2 cm;  Surgeon: Denisse Preciado DPM;  Location: [facility] OR       Allergies:  No Known Allergies    Medications:   Current Outpatient Medications   Medication Sig Dispense Refill    amoxicillin-clavulanate (AUGMENTIN) 875-125 MG tablet Take 1 tablet by mouth 2 times daily for 10 days. 20 tablet 0    atorvastatin (LIPITOR) 40 MG tablet Take 1 tablet (40 mg) by mouth daily. 90 tablet 0    blood glucose (NO BRAND SPECIFIED) lancets standard Use to test blood sugar 3 times daily 100 Lancet 0    blood glucose (NO BRAND SPECIFIED) test strip Use to test blood sugar 3 times daily or as directed. 100 strip 0    blood glucose monitoring (NO BRAND SPECIFIED) meter device kit Use to test blood sugar 3 times daily or as directed. 1 kit 0    Continuous Glucose Sensor (DEXCOM G7 SENSOR) MISC 1 Device every 10 days. 3 each 5    hydrochlorothiazide (HYDRODIURIL) 25 MG tablet Take 1 tablet (25 mg) by mouth daily. 90 tablet 0    insulin aspart (NOVOLOG FLEXPEN) 100 UNIT/ML pen Inject 7 Units subcutaneously 3 times daily (with meals). And extra 1 unit every 50 mg/dL. Do not exceed 40 units per day. 30 mL 0    insulin glargine (LANTUS PEN) 100 UNIT/ML pen  Inject 28 Units subcutaneously every morning (before breakfast). Plus 2 units for priming. Increase by 2 units every 4 days if fasting blood sugar over 130 mg/dL. Do not exceed 50 units per day. 60 mL 0    lisinopril (ZESTRIL) 20 MG tablet Take 1 tablet (20 mg) by mouth daily. 90 tablet 0    metoprolol succinate ER (TOPROL XL) 25 MG 24 hr tablet Take 1 tablet (25 mg) by mouth daily. 90 tablet 0    rivaroxaban ANTICOAGULANT (XARELTO) 20 MG TABS tablet Take 1 tablet (20 mg) by mouth daily (with dinner). 90 tablet 0     No current facility-administered medications for this encounter.         Objective:  Vitals:  BP 98/68 (BP Location: Left arm, Patient Position: Sitting, Cuff Size: Adult Regular)   Pulse 75   Temp 97.1  F (36.2  C) (Temporal)     A1C: 12.3     General:  Patient is alert and orientated.  NAD      Dermatologic: Left foot: large dorsal ulcer with exposed tendons. Noted nonviable and necrotic tissue, mostly to the fourth metatarsal site. Periwound with some erythema. Fourth digit with some erythema. No ascending or significant signs of cellulitis.  --> less erythema present. Wound bed still fairly large. Ongoing necrosis to the fourth metatarsal head       .   Negative Pressure Wound Therapy Foot Left;Lateral;Anterior (Active)   Wound Type Surgical 04/19/25 0900   Dressing Pieces Applied (# of Each Type) 1;Black foam;Non-adherent contact layer 04/19/25 0900   Cycle Continuous 04/19/25 0900   Target Pressure (mmHg) 125 04/19/25 0900   Cannister changed? No 04/19/25 0900       Wound (used by OP I only) 04/28/25 1220 foot left diabetic ulcer (Active)   Thickness/Stage full thickness 05/05/25 1315   Base exposed structure;slough;other (see comments);pink 05/05/25 1315   Periwound macerated 05/05/25 1315   Periwound Temperature warm 05/05/25 1315   Periwound Skin Turgor soft 05/05/25 1315   Edges open 05/05/25 1315   Length (cm) 9.2 05/05/25 1315   Width (cm) 8.5 05/05/25 1315   Depth (cm) 1.2 05/05/25 1315    Wound (cm^2) 78.2 cm^2 05/05/25 1315   Wound Volume (cm^3) 93.84 cm^3 05/05/25 1315   Wound healing % 20.85 05/05/25 1315   Drainage Characteristics/Odor serosanguineous 05/05/25 1315   Drainage Amount moderate 05/05/25 1315   Care, Wound debrided 05/05/25 1315          Vascular: DP & PT pulses are intact & regular bilaterally.  No significant edema or varicosities noted.  CFT and skin temperature is normal to both lower extremities.     Neurologic: Lower extremity sensation is intact to light touch.  No evidence of weakness or contracture in the lower extremities.  No evidence of neuropathy.     Musculoskeletal: Patient is ambulatory without assistive device or brace.  No gross ankle deformity noted.  No foot or ankle joint effusion is noted.     Imaging:    IMPRESSION: Interval, now complete amputation of the fifth metatarsal. Postoperative soft tissue gas about the left lateral foot. No acute fracture. Mild scattered degenerative arthritis. Plantar calcaneal spur. Arterial calcifications     Cultures:  1+ Fusobacterium species Abnormal       Pathology (4/8/2025):      Final Diagnosis   Toe, left, fifth ray, resection-  Epidermal ulceration with associated acute osteomyelitis (see description)  Resection margin free of osteomyelitis  No evidence of malignancy      Pathology 4/11  Final Diagnosis   Bone, left foot, fifth metatarsal, biopsy-  Microscopic foci compatible with acute osteomyelitis       Assessment:      Left foot ulcer, s/p fifth metatarsal resection and several debridements, as treatment for gas gangrene   Diabetes Mellitus --> hba1c: 12.3    Plan:    -Discussed all findings with patient. Chart and imaging reviewed.     -Reviewed current course so far. Some areas of improvement to wound bed dorsally, but concern that the fourth MPJ is too exposed and is worsening. Discussed this with patient and recommended/possible surgery. Discussed this could either be a fourth ray resection or a TMA. A TMA would  be preferred as this would close a lot of the dorsal soft tissue defect compared to the fourth ray would leave a large open ulcer dorsally. Patient overall somewhat resistant to further surgery.    -Patient asks about a second opinion with [place]. States his family have followed with podiatry there. Provided and faxed referral.     -Recommended xray to evaluate fourth MPJ bone. Order placed. He agrees to this     -Provided letter for work stating he'll likely be out of work for another 2 months.     -Excisional debridement of ulcer as below.     -Has antibiotics for another week.     -Follow up in one week.     Procedure:  After verbal consent, per protocol lidocaine was applied to the wound. Excisional debridement was performed on ulcer.   #15 blade was used to debride ulcer down to and including subcutaneous tissue, on the left foot. Bleeding controlled with light pressure.  No drainage noted.  Debrided site measured 5 cm x 5 cm x .7 cm > 20sq cm debrided.  Dry dressing applied to foot.  Patient tolerated procedure well.     Denisse Preciado DPM    Greater than 30 minutes were spent today, reviewing previous hospital records, counseling and educating the patient on the ongoing treatment plan and coordinating care.                 Further instructions from your care team         05/05/2025   Justino Giordano   1960    A DME order was not completed because the supplies are ordered by home care or at a care facility  Dressing changes outside of clinic are being performed by Home Care (3 times weekly)  [facility] Phone [phone] Fax [phone]     Plan 05/05/2025   Bone is exposed and not improving  Please call the scheduling  to schedule your left foot xray appointment at [facility]: [phone]   Referral  Referral placed per your request to get an additional opinion at the [facility]   Dr. Josh Gil #[phone] --- Fax#[phone]  We will send you with a work  restriction letter for work today   Complete entire oral antibiotic course  Do not get foot wet; use cast protector or sponge bath  Mobility:  Limit standing and walking; take pressure off of wound as much as possible   When walking or standing, wear your walking boot      Wound dressing: Left foot  -Remove old dressing and ensure all black foam is removed  -Cleanse wound with Vashe moist gauze, leave in place for 10 minutes; remove   -Cleanse periwound skin with wound spray, pat dry and apply No Sting Barrier film.  -Cut Black Foam to fill the depth of wound but please ensure it is not overstuffed.  -Cover wound with VAC dressing tape to seal the foam, cut appropriate size opening for the TRAC pad.  -Connect TRAC pad and connect to pump; NPWT -125 mmHg Continuous, ensure no leaks  Change canister when alarms full or weekly  Change dressing three times a week  Document on the outside of the dressing the number of sponges used and the date of the dressing  If dressing is compromised for greater than 2 hours then please remove entire dressing and change or tuck moist Vashe gauze into wound until new dressing can be applied.  May use ostomy paste for divots and crevices as needed to maintain seal.     Alternative Dressing if Vac has to be removed during weekend or when home care unable to come  Wound dressing: Left foot  - Remove old dressing and ensure all black foam is removed  - Apply thin layer of zinc oxide around wound    - Apply VASHE moistened gauze to wound bed  - Cover with absorbant pad   - Secure with roll gauze and tape (no tape to skin)  Change dressing daily and as needed if dressing becomes soiled or dislodged     Protein:  A diet high in protein is important for wound healing, we recommend getting 90 grams of protein per day unless medically contraindicated. Taking protein shakes or bars are a good way to get extra protein in your diet.        Main Provider: DONNY AlvesPALFONZO. May 5, 2025    Call  us at [phone] if you have any questions about your wounds, if you have redness or swelling around your wound, have a fever of 101 degrees Fahrenheit or greater or if you have any other problems or concerns. We answer the phone Monday through Friday 8 am to 4 pm, please leave a message as we check the voicemail frequently throughout the day. If you have a concern over the weekend, please leave a message and we will return your call Monday. If the need is urgent, go to the ER or urgent care.    If you had a positive experience please indicate that on your patient satisfaction survey form that [facility] will be sending you.    It was a pleasure meeting with you today.  Thank you for allowing me and my team the privilege of caring for you today.  YOU are the reason we are here, and I truly hope we provided you with the excellent service you deserve.  Please let us know if there is anything else we can do for you so that we can be sure you are leaving completely satisfied with your care experience.      If you have any billing related questions please call the [facility] Business office at [phone]. The clinic staff does not handle billing related matters.    If you are scheduled to have a follow up appointment, you will receive a reminder call the day before your visit. On the appointment day please arrive 15 minutes prior to your appointment time. If you are unable to keep that appointment, please call the clinic to cancel or reschedule. If you are more than 10 minutes late or greater for your scheduled appointment time, the clinic policy is that you may be asked to reschedule.

## 2025-05-06 ENCOUNTER — VIRTUAL VISIT (OUTPATIENT)
Dept: EDUCATION SERVICES | Facility: CLINIC | Age: 65
End: 2025-05-06
Payer: MEDICARE

## 2025-05-06 DIAGNOSIS — E11.52 TYPE 2 DIABETES MELLITUS WITH DIABETIC PERIPHERAL ANGIOPATHY AND GANGRENE, WITH LONG-TERM CURRENT USE OF INSULIN (H): ICD-10-CM

## 2025-05-06 DIAGNOSIS — Z79.4 TYPE 2 DIABETES MELLITUS WITH DIABETIC PERIPHERAL ANGIOPATHY AND GANGRENE, WITH LONG-TERM CURRENT USE OF INSULIN (H): ICD-10-CM

## 2025-05-06 DIAGNOSIS — Z53.9 DIAGNOSIS NOT YET DEFINED: Primary | ICD-10-CM

## 2025-05-06 PROCEDURE — G0180 MD CERTIFICATION HHA PATIENT: HCPCS | Performed by: PHYSICIAN ASSISTANT

## 2025-05-06 PROCEDURE — G0108 DIAB MANAGE TRN  PER INDIV: HCPCS | Mod: 93 | Performed by: DIETITIAN, REGISTERED

## 2025-05-06 NOTE — TELEPHONE ENCOUNTER
Provider completed form.  Faxed to [facility].  Placed in completed faxes file @ [facility] desk.      Claudia GRIER, - [facility] 2  Primary Care- [place]  [facility]

## 2025-05-06 NOTE — LETTER
2025         RE: Justino Giordano  [address] [address] [facility] [address]        Dear Colleague,    Thank you for referring your patient, Justino Giordano, to the [facility]. Please see a copy of my visit note below.      Diabetes Self-Management Education & Support    Presents for:      Type of Service: Telephone Visit    Originating Location (Patient Location): Home  Distant Location (Provider Location): Offsite  Mode of Communication:  Telephone    Assessment  DSME visit   DM diagnosis in . Fam hx:sister, grandparents  Most recent A1c was vastly elevated   Currently on: basal insulin and sliding scale meal time insulin. Understands the sliding scale very well   Denies any s/s of hypo and hyperglycemia and reports feeling a lot better overall.    Good support from sister and neighbors in his condo complex    SMBG:  Tests 2x/d, uses his glucose meter. Pharmacy didn't dispense dexcom yet since he still has ~ 2 weeks worth of test strips and lancets, can fill Dexcom once he's done using these.  BG  - :  FB, 137, 133, 133, 124, 132, 112  Before lunch: 139, 141, 120, 129, 94, 122  Before dinner: 90, 103, 144, 116, 116, 145  No lows    Consumes 3 meals/d. Watching his carbs and doing a lot more protein and f/v.   Food recall:  BF: cereal, toast, orange, 4 oz cranberry juice (1 gram sugar)  L: tuna sandwich, veg  D: cereal, apple, jello, popcorn  Snack:fruit, yogurt  Beverages:water, milk, cranberry juice (1 gram sugar)    Education/Discussion: Reviewed diabetes basics, AIC and BS goals, sx and tx of hypo and hyperglycemia, complications of uncontrolled diabetes, general activity and diet guidelines, CHO foods, concept of budgeting and balancing, planning ahead for healthy meals, examples of quick and healthy meals/snacks, plate method for portion control, meter and testing basics: pt expressed understanding.    Encouraged to not wait longer than 4-6 hours to eat meals or  snacks and discussed how eating regularly helps avoid lows and keeps the liver from putting out glucose on its own, making the blood sugar higher.    Discussed the need to eat healthily - mainly vegetables, some meat, limited rice/bread, noodles, exercise, drinking more water, sleep well       Patient's most recent   Lab Results   Component Value Date    A1C 12.3 04/07/2025    A1C 11.7 04/01/2021     is not meeting goal of <8.0    Care Plan and Education Provided:  Healthy Eating: Balanced meals, Consistency in amount and timing of carbohydrate intake, Label reading, Plate planning method, and Portion control  Being Active: Finding a physical activity routine that works for you  Monitoring: Frequency of monitoring, Individual glucose targets, Log and interpret results, and Purpose  Taking Medication: Action of prescribed medication(s), Administering and storing injectable diabetes medications, Proper site selection and rotation for injections, Side effects of prescribed medication(s), and When to take medication(s)  Problem Solving: High glucose - causes, signs/symptoms, treatment and prevention, Low glucose - causes, signs/symptoms, treatment and prevention, and Rule of 15 and carrying a carbohydrate source at all times in case of low glucose  Reducing Risks: Complications of diabetes and A1C - goals, relating to blood glucose levels, how often to check    Patient verbalized understanding of diabetes self-management education concepts discussed, opportunities for ongoing education and support, and recommendations provided today.    Plan    No changes recommended to current therapy.  Pt to continue focusing on taking meds a prescribed, eating healthfully and regular physical activity.  MTM appt:5/12  CDCES:7/3    Topics to cover at upcoming visits: Problem Solving and Reducing Risks    See Care Plan for co-developed, patient-state behavior change goals.    Education Materials Provided:  - My Plate Planner  "      Subjective/Objective  Justino is an 65 year old, presenting for the following diabetes education related to:    Accompanied by: Self  Diabetes education in the past 24mo: No  Diabetes type: Type 2  Date of diagnosis: 2013  Cultural Influences/Ethnic Background:  Not  or     Diabetes Symptoms & Complications:  Diabetes Related Symptoms: None       Patient Problem List and Family Medical History reviewed for relevant medical history, current medical status, and diabetes risk factors.    Vitals:  There were no vitals taken for this visit.  Estimated body mass index is 30.62 kg/m  as calculated from the following:    Height as of 4/21/25: 1.905 m (6' 3\").    Weight as of 4/21/25: 111.1 kg (245 lb).   Last 3 BP:   BP Readings from Last 3 Encounters:   05/05/25 98/68   04/28/25 93/70   04/21/25 109/83       History   Smoking Status     Never   Smokeless Tobacco     Never       Labs:  Lab Results   Component Value Date    A1C 12.3 04/07/2025    A1C 11.7 04/01/2021     Lab Results   Component Value Date     04/19/2025     05/10/2021     Lab Results   Component Value Date     04/07/2025     04/08/2021     HDL Cholesterol   Date Value Ref Range Status   04/08/2021 47 >39 mg/dL Final     Direct Measure HDL   Date Value Ref Range Status   04/07/2025 41 >=40 mg/dL Final     GFR Estimate   Date Value Ref Range Status   04/18/2025 79 >60 mL/min/1.73m2 Final     Comment:     eGFR calculated using 2021 CKD-EPI equation.   05/10/2021 >90 >60 mL/min/[1.73_m2] Final     Comment:     Non  GFR Calc  Starting 12/18/2018, serum creatinine based estimated GFR (eGFR) will be   calculated using the Chronic Kidney Disease Epidemiology Collaboration   (CKD-EPI) equation.       GFR Estimate If Black   Date Value Ref Range Status   05/10/2021 >90 >60 mL/min/[1.73_m2] Final     Comment:      GFR Calc  Starting 12/18/2018, serum creatinine based estimated GFR (eGFR) will " be   calculated using the Chronic Kidney Disease Epidemiology Collaboration   (CKD-EPI) equation.       Lab Results   Component Value Date    CR 1.05 04/18/2025    CR 0.89 05/10/2021     Lab Results   Component Value Date    MICROL 85 04/08/2021    UMALCR 28.30 (H) 04/08/2021    UCRR 300 04/08/2021 5/6/2025   Healthy Eating   Healthy Eating Assessed Today Yes   Who cooks/prepares meals for you? Other;Sister   Who purchases food in  your home? Sister;Other   Meals include Breakfast;Lunch;Dinner   Beverages Water;Juice;Milk   Has patient met with a dietitian in the past? No         5/6/2025   Monitoring   Monitoring Assessed Today Yes     Diabetes Medication(s)       Insulin       insulin aspart (NOVOLOG FLEXPEN) 100 UNIT/ML pen Inject 7 Units subcutaneously 3 times daily (with meals). And extra 1 unit every 50 mg/dL. Do not exceed 40 units per day.     insulin glargine (LANTUS PEN) 100 UNIT/ML pen Inject 28 Units subcutaneously every morning (before breakfast). Plus 2 units for priming. Increase by 2 units every 4 days if fasting blood sugar over 130 mg/dL. Do not exceed 50 units per day.              5/6/2025   Taking Medications   Taking Medication Assessed Today Yes   Current Treatments Diet;Oral Medication (taken by mouth);Insulin Injections          No data to display                     No data to display                ERIC Patel RDN, [facility]  Diabetes Care and Education  [phone] (Triage)   Time Spent: 53 minutes  Encounter Type: Individual    Any diabetes medication dose changes were made via the [facility] Standing Orders under the patient's referring provider.

## 2025-05-06 NOTE — PROGRESS NOTES
Diabetes Self-Management Education & Support    Presents for:      Type of Service: Telephone Visit    Originating Location (Patient Location): Home  Distant Location (Provider Location): Offsite  Mode of Communication:  Telephone    Assessment  DSME visit   DM diagnosis in . Fam hx:sister, grandparents  Most recent A1c was vastly elevated   Currently on: basal insulin and sliding scale meal time insulin. Understands the sliding scale very well   Denies any s/s of hypo and hyperglycemia and reports feeling a lot better overall.    Good support from sister and neighbors in his condo complex    SMBG:  Tests 2x/d, uses his glucose meter. Pharmacy didn't dispense dexcom yet since he still has ~ 2 weeks worth of test strips and lancets, can fill Dexcom once he's done using these.  BG  - :  FB, 137, 133, 133, 124, 132, 112  Before lunch: 139, 141, 120, 129, 94, 122  Before dinner: 90, 103, 144, 116, 116, 145  No lows    Consumes 3 meals/d. Watching his carbs and doing a lot more protein and f/v.   Food recall:  BF: cereal, toast, orange, 4 oz cranberry juice (1 gram sugar)  L: tuna sandwich, veg  D: cereal, apple, jello, popcorn  Snack:fruit, yogurt  Beverages:water, milk, cranberry juice (1 gram sugar)    Education/Discussion: Reviewed diabetes basics, AIC and BS goals, sx and tx of hypo and hyperglycemia, complications of uncontrolled diabetes, general activity and diet guidelines, CHO foods, concept of budgeting and balancing, planning ahead for healthy meals, examples of quick and healthy meals/snacks, plate method for portion control, meter and testing basics: pt expressed understanding.    Encouraged to not wait longer than 4-6 hours to eat meals or snacks and discussed how eating regularly helps avoid lows and keeps the liver from putting out glucose on its own, making the blood sugar higher.    Discussed the need to eat healthily - mainly vegetables, some meat, limited rice/bread, noodles,  exercise, drinking more water, sleep well       Patient's most recent   Lab Results   Component Value Date    A1C 12.3 04/07/2025    A1C 11.7 04/01/2021     is not meeting goal of <8.0    Care Plan and Education Provided:  Healthy Eating: Balanced meals, Consistency in amount and timing of carbohydrate intake, Label reading, Plate planning method, and Portion control  Being Active: Finding a physical activity routine that works for you  Monitoring: Frequency of monitoring, Individual glucose targets, Log and interpret results, and Purpose  Taking Medication: Action of prescribed medication(s), Administering and storing injectable diabetes medications, Proper site selection and rotation for injections, Side effects of prescribed medication(s), and When to take medication(s)  Problem Solving: High glucose - causes, signs/symptoms, treatment and prevention, Low glucose - causes, signs/symptoms, treatment and prevention, and Rule of 15 and carrying a carbohydrate source at all times in case of low glucose  Reducing Risks: Complications of diabetes and A1C - goals, relating to blood glucose levels, how often to check    Patient verbalized understanding of diabetes self-management education concepts discussed, opportunities for ongoing education and support, and recommendations provided today.    Plan    No changes recommended to current therapy.  Pt to continue focusing on taking meds a prescribed, eating healthfully and regular physical activity.  MTM appt:5/12  CDCES:7/3    Topics to cover at upcoming visits: Problem Solving and Reducing Risks    See Care Plan for co-developed, patient-state behavior change goals.    Education Materials Provided:  - My Plate Planner       Subjective/Objective  Justino is an 65 year old, presenting for the following diabetes education related to:    Accompanied by: Self  Diabetes education in the past 24mo: No  Diabetes type: Type 2  Date of diagnosis: 2013  Cultural Influences/Ethnic  "Background:  Not  or     Diabetes Symptoms & Complications:  Diabetes Related Symptoms: None       Patient Problem List and Family Medical History reviewed for relevant medical history, current medical status, and diabetes risk factors.    Vitals:  There were no vitals taken for this visit.  Estimated body mass index is 30.62 kg/m  as calculated from the following:    Height as of 4/21/25: 1.905 m (6' 3\").    Weight as of 4/21/25: 111.1 kg (245 lb).   Last 3 BP:   BP Readings from Last 3 Encounters:   05/05/25 98/68   04/28/25 93/70   04/21/25 109/83       History   Smoking Status    Never   Smokeless Tobacco    Never       Labs:  Lab Results   Component Value Date    A1C 12.3 04/07/2025    A1C 11.7 04/01/2021     Lab Results   Component Value Date     04/19/2025     05/10/2021     Lab Results   Component Value Date     04/07/2025     04/08/2021     HDL Cholesterol   Date Value Ref Range Status   04/08/2021 47 >39 mg/dL Final     Direct Measure HDL   Date Value Ref Range Status   04/07/2025 41 >=40 mg/dL Final     GFR Estimate   Date Value Ref Range Status   04/18/2025 79 >60 mL/min/1.73m2 Final     Comment:     eGFR calculated using 2021 CKD-EPI equation.   05/10/2021 >90 >60 mL/min/[1.73_m2] Final     Comment:     Non  GFR Calc  Starting 12/18/2018, serum creatinine based estimated GFR (eGFR) will be   calculated using the Chronic Kidney Disease Epidemiology Collaboration   (CKD-EPI) equation.       GFR Estimate If Black   Date Value Ref Range Status   05/10/2021 >90 >60 mL/min/[1.73_m2] Final     Comment:      GFR Calc  Starting 12/18/2018, serum creatinine based estimated GFR (eGFR) will be   calculated using the Chronic Kidney Disease Epidemiology Collaboration   (CKD-EPI) equation.       Lab Results   Component Value Date    CR 1.05 04/18/2025    CR 0.89 05/10/2021     Lab Results   Component Value Date    MICROL 85 04/08/2021    UMALCR " 28.30 (H) 04/08/2021    [facility] 300 04/08/2021 5/6/2025   Healthy Eating   Healthy Eating Assessed Today Yes   Who cooks/prepares meals for you? Other;Sister   Who purchases food in  your home? Sister;Other   Meals include Breakfast;Lunch;Dinner   Beverages Water;Juice;Milk   Has patient met with a dietitian in the past? No         5/6/2025   Monitoring   Monitoring Assessed Today Yes     Diabetes Medication(s)       Insulin       insulin aspart (NOVOLOG FLEXPEN) 100 UNIT/ML pen Inject 7 Units subcutaneously 3 times daily (with meals). And extra 1 unit every 50 mg/dL. Do not exceed 40 units per day.     insulin glargine (LANTUS PEN) 100 UNIT/ML pen Inject 28 Units subcutaneously every morning (before breakfast). Plus 2 units for priming. Increase by 2 units every 4 days if fasting blood sugar over 130 mg/dL. Do not exceed 50 units per day.              5/6/2025   Taking Medications   Taking Medication Assessed Today Yes   Current Treatments Diet;Oral Medication (taken by mouth);Insulin Injections          No data to display                     No data to display                ERIC Patel RDN, [facility]  Diabetes Care and Education  [phone] (Triage)   Time Spent: 53 minutes  Encounter Type: Individual    Any diabetes medication dose changes were made via the [facility] Standing Orders under the patient's referring provider.

## 2025-05-07 NOTE — PATIENT INSTRUCTIONS
Dear Justino,     It was pleasure visiting with you on 5/6. Please find attached some helpful resources here. I tried emailing you some patient education materials as well, but kept getting an error message. Please share your current email if you would like those sent out.     Please feel free to call with any questions.    Thanks!   ERIC Patel RDN, [facility]  Diabetes Care and Education  [phone] (Triage)

## 2025-05-08 ENCOUNTER — TELEPHONE (OUTPATIENT)
Dept: FAMILY MEDICINE | Facility: CLINIC | Age: 65
End: 2025-05-08

## 2025-05-08 DIAGNOSIS — E11.9 TYPE 2 DIABETES MELLITUS WITHOUT COMPLICATION, WITH LONG-TERM CURRENT USE OF INSULIN (H): ICD-10-CM

## 2025-05-08 DIAGNOSIS — Z79.4 TYPE 2 DIABETES MELLITUS WITHOUT COMPLICATION, WITH LONG-TERM CURRENT USE OF INSULIN (H): ICD-10-CM

## 2025-05-08 RX ORDER — ACYCLOVIR 400 MG/1
1 TABLET ORAL
Qty: 9 EACH | Refills: 1 | Status: SHIPPED | OUTPATIENT
Start: 2025-05-08

## 2025-05-08 NOTE — TELEPHONE ENCOUNTER
Updated Dexcom G7 sensor Rx for Medicare compliance.     Avni Andino, PharmD  [facility]

## 2025-05-12 ENCOUNTER — OFFICE VISIT (OUTPATIENT)
Dept: PHARMACY | Facility: CLINIC | Age: 65
End: 2025-05-12

## 2025-05-12 VITALS
WEIGHT: 242.7 LBS | OXYGEN SATURATION: 99 % | SYSTOLIC BLOOD PRESSURE: 83 MMHG | BODY MASS INDEX: 30.34 KG/M2 | DIASTOLIC BLOOD PRESSURE: 57 MMHG | HEART RATE: 89 BPM

## 2025-05-12 DIAGNOSIS — I10 ESSENTIAL HYPERTENSION, BENIGN: ICD-10-CM

## 2025-05-12 DIAGNOSIS — Z79.4 TYPE 2 DIABETES MELLITUS WITHOUT COMPLICATION, WITH LONG-TERM CURRENT USE OF INSULIN (H): Primary | ICD-10-CM

## 2025-05-12 DIAGNOSIS — E11.9 TYPE 2 DIABETES MELLITUS WITHOUT COMPLICATION, WITH LONG-TERM CURRENT USE OF INSULIN (H): Primary | ICD-10-CM

## 2025-05-12 PROCEDURE — 99207 PR NO CHARGE LOS: CPT | Performed by: PHARMACIST

## 2025-05-12 RX ORDER — HYDROCHLOROTHIAZIDE 12.5 MG/1
12.5 TABLET ORAL DAILY
Qty: 30 TABLET | Refills: 1 | Status: SHIPPED | OUTPATIENT
Start: 2025-05-12

## 2025-05-12 RX ORDER — INSULIN GLARGINE-YFGN 100 [IU]/ML
32 INJECTION, SOLUTION SUBCUTANEOUS EVERY MORNING
Qty: 60 ML | Refills: 1 | Status: SHIPPED | OUTPATIENT
Start: 2025-05-12

## 2025-05-12 NOTE — PATIENT INSTRUCTIONS
"Recommendations from today's MTM visit:                                                       Schedule diabetes eye exam  Continue your insulin without change. If you start to have blood sugars less than 70 schedule a follow-up sooner otherwise I will follow-up   Bring your Dexcom  to our next visit.  Decrease hydrochlorothiazide to half tablet 12.5 mg once daily.  Call the pharmacy and make sure they have the insulin you need and it is covered and ask about refill reminders.    Follow-up: Return in 4 weeks (on 6/9/2025).    It was great speaking with you today.  I value your experience and would be very thankful for your time in providing feedback in our clinic survey. In the next few days, you may receive an email or text message from [email or web address] with a link to a survey related to your  clinical pharmacist.\"     To schedule another MTM appointment, please call the clinic directly or you may call the MTM scheduling line at [phone] or toll-free at [phone].     My Clinical Pharmacist's contact information:                                                      Please feel free to contact me with any questions or concerns you have.      Promise Doty, PharmD  Medication Therapy Management Pharmacist  [facility] - Monday and Wednesday 7:30 - 4:00    "

## 2025-05-12 NOTE — PROGRESS NOTES
Medication Therapy Management (MTM) Encounter    ASSESSMENT:                            Medication Adherence/Access: No issues identified.    Diabetes  A1c not at goal, newer diagnosis and recently started insulin. Continue on basal and mealtime insulin without change today since blood sugars fasting and postprandial are at goal with no hypoglycemia. Would recommend A1C goal < 7% and aggressive control to assist with wound healing. Insulin is the best option for him right now to get under tight control but could consider other pharmacotherapy options other than insulin in the future. He will start the Dexcom continuous glucose monitor this week and bring his  in to next visit to review data. I did ask him to schedule diabetes eye exam and ordered referral today.     Hypertension and atrial flutter  Blood pressure at goal and lower than at last visit. He is not having symptoms but to be cautious and prevent falls, will decrease hydrochlorothiazide today and recheck next month.     PLAN:                            Schedule diabetes eye exam, referral ordered  Continue your insulin without change. If you start to have blood sugars less than 70 schedule a follow-up sooner otherwise I will follow-up   Bring your Dexcom  to our next visit.  Decrease hydrochlorothiazide to half tablet 12.5 mg once daily.  Call the pharmacy and make sure they have the insulin you need and it is covered and ask about refill reminders.    Follow-up: Return in 4 weeks (on 6/9/2025).    SUBJECTIVE/OBJECTIVE:                          Justino Giordano is a 65 year old male seen for a follow-up visit from 4/21/25.       Reason for visit: follow-up diabetes.    Allergies/ADRs: Reviewed in chart  Past Medical History: Reviewed in chart  Tobacco: He reports that he has never smoked. He has never been exposed to tobacco smoke. He has never used smokeless tobacco.  Alcohol: not currently using      Medication Adherence/Access: no issues  reported. He brought a letter in today from his insurance that stated they will only cover insulin aspart and insulin glargine-YFGN which is generic Semglee. New prescription sent today.    Diabetes   Lantus 32 units daily morning - increased at last MTM visit  Novolog 7 units plus the following correction scale started at last MTM visit - using about 21 units daily    <70 mg/dL:           take no insulin injections  70-99 mg/dL:        minus 2 units   100-150 mg/dL:    no adjustment = 7 units  150-199 mg/dL:    add 1 units (= 8 units)  200-249 mg/dL:    add 2 units  (= 9 units)  250-299 mg/dL:    add 3 units  (= 10 units)  >300 mg/dL:         add 4 units and call your doctor  (= 11 units)    He reports no low blood sugars.     Diet/Exercise:   He had visit with diabetes education and has follow-up in July  Use to only eat 3 meals a day. Doesn't snack between meals.  Trying to eat low carb meals and small portions. He eats sugar free treats if he does have them. He eats lots of vegetables. Activity is limited due to his foot wound healing. He is not supposed to be on his foot for long.       Blood sugar monitoring: 3 time(s) daily before meals; He was started on Dexcom G7 and it will be getting delivered tomorrow.   Ranges: (patient reported)   Before breakfast: 110's-130's  Before lunch: about the same  Before dinner: about the same    Only had one day where blood sugars were over 150    Eye exam in the last 12 months? No  Foot exam: due He has wound care coming out three times a week.     Hypertension and atrial flutter  Hydrochlorothiazide 25 mg daily   Lisinopril 20 mg daily   Metoprolol ER 25 mg daily   Xarelto 20 mg in the evening    Patient reports no current medication side effects. He doesn't have dizziness sitting or when standing. He does feel a little tired and concerned that his blood pressure his low today as it has not been this low.  Denies any symptoms palpitation/flutter.   [id number] Score = 3                Today's Vitals: BP (!) 83/57   Pulse 89   Wt 242 lb 11.2 oz (110.1 kg)   SpO2 99%   BMI 30.34 kg/m    ----------------    I spent 45 minutes with this patient today. All changes were made via collaborative practice agreement with YONAS Callejas.    A summary of these recommendations was given to the patient.    Promise Doty, PharmD  Medication Therapy Management Pharmacist     Medication Therapy Recommendations  Essential hypertension, benign   1 Current Medication: hydrochlorothiazide (HYDRODIURIL) 25 MG tablet (Discontinued)   Current Medication Sig: Take 1 tablet (25 mg) by mouth daily.   Rationale: Dose too high - Dosage too high - Safety   Recommendation: Decrease Dose   Status: Accepted per CPA   Identified Date: 5/12/2025 Completed Date: 5/12/2025

## 2025-05-12 NOTE — Clinical Note
Blood sugars look good. I did decrease his hydrochlorothiazide with his blood pressure being on the lower end. He was not having symptoms but want avoid a fall. I will be following up in 1 month to review his dexcom data and recheck blood pressure.   Promise

## 2025-05-13 ENCOUNTER — ANCILLARY PROCEDURE (OUTPATIENT)
Dept: GENERAL RADIOLOGY | Facility: CLINIC | Age: 65
End: 2025-05-13
Attending: PODIATRIST
Payer: MEDICARE

## 2025-05-13 ENCOUNTER — PATIENT OUTREACH (OUTPATIENT)
Dept: CARE COORDINATION | Facility: CLINIC | Age: 65
End: 2025-05-13

## 2025-05-13 DIAGNOSIS — R22.42 LOCALIZED SWELLING OF LEFT FOOT: ICD-10-CM

## 2025-05-13 PROCEDURE — 73630 X-RAY EXAM OF FOOT: CPT | Mod: TC | Performed by: RADIOLOGY

## 2025-05-14 ENCOUNTER — TELEPHONE (OUTPATIENT)
Dept: FAMILY MEDICINE | Facility: CLINIC | Age: 65
End: 2025-05-14
Payer: MEDICARE

## 2025-05-14 DIAGNOSIS — I48.92 ATRIAL FLUTTER, PAROXYSMAL (H): Primary | ICD-10-CM

## 2025-05-14 NOTE — TELEPHONE ENCOUNTER
Pt calls.    He is on xarelto. He is not sure how long that is going to be for.  He said the xarelto is going to cost him $220 a month.  Advised sometimes these are long term things, but we can certainly ask.      He is wondering the cost of eliquis.      He said he only got a 30 day supply of the xarelto.  Advised 90 were sent in.  Advised not sure if his insurance will only allow 30 days.  Advised to check with the pharmacy on that.      It sounds like his mom was on coumadin and he does not want to go on that.      He said he has an appt with Cardiology on Alondra 10.     Will forward to Adrian Ellison.  Please advise - how long does he have to be on a blood thinner?  Could he be on eliquis?  His sister is on eliquis and that is cheaper for her.  Advised everyone's insurance is very different.  Advised not sure, if Adrian will want Cardiology or MTM to weigh in on this, but we can let him know.

## 2025-05-14 NOTE — TELEPHONE ENCOUNTER
Yes, this is a good question for both MTM (can help with cost savings options possibly) but also importantly for cardiology.  I will forward to Promise and it looks like he see's cardiology in June.    Adrian

## 2025-05-15 ENCOUNTER — PATIENT OUTREACH (OUTPATIENT)
Dept: CARE COORDINATION | Facility: CLINIC | Age: 65
End: 2025-05-15
Payer: MEDICARE

## 2025-05-15 NOTE — TELEPHONE ENCOUNTER
Covering for Promise Doty, PharmD who is out of the office today.  It appears he's on Xarelto for atrial flutter, so would anticipate this being long term unless cardiology directs otherwise.  From what I can tell in EPIC - it appears Eliquis is about the same price as Xarelto (plus needs to be taken twice daily) under his insurance.  He could be needing to meet a deductible before lower copays start - the pharmacy should be able to help guide him on that.  If needed, can refer to patient assistance as well ([phone]).  Brissa Chin, VaniaD, [facility]  Medication Therapy Management Provider  [phone]

## 2025-05-15 NOTE — TELEPHONE ENCOUNTER
Called patient, left voicemail, and asked patient to call back. Attempt # 1.     Estela Jyoa RN 5/15/2025  [facility]

## 2025-05-15 NOTE — TELEPHONE ENCOUNTER
Noted by  RN's. Will await response from [facility].    Mitzy Roberson RN   [facility]

## 2025-05-15 NOTE — TELEPHONE ENCOUNTER
Received call back from patient who reports he has 5 days left of xarelto.    RN relayed message from Adrian Ellison PA-C below.     Patient again discussed wanting to possibly try eliquis if that would be more affordable. RN again reiterated that it is up to insurance what medications are covered and eveyone's insurance plan is different. Patient stated he talked to insurance and mentioned a payment plan, patient did not fully understand what the payment plan entailed.     RN routing to [facility]/covering team with MTM to weigh in.         *On a side note- pt reports he was delivered his neighbor's medical supplies for catheter (unclear which shipping company, or what company the supplies are from). Patient lives in a senior living apartment. Documenting for tracking purposes if happens again.    No further action required regarding this.      Katharine ADKINS RN  [facility]

## 2025-05-15 NOTE — TELEPHONE ENCOUNTER
Called pt and relayed message below. Pt understands and states he has already spoke with the patient assistance program. He will pay in increments instead of the lump sum.     Pt also had some billing questions, directed pt to contact the billing department.    Mitzy Roberson RN   [facility]

## 2025-05-19 ENCOUNTER — HOSPITAL ENCOUNTER (OUTPATIENT)
Dept: WOUND CARE | Facility: CLINIC | Age: 65
Discharge: HOME | End: 2025-05-19
Attending: PODIATRIST | Admitting: PODIATRIST
Payer: MEDICARE

## 2025-05-19 VITALS — TEMPERATURE: 97.1 F | HEART RATE: 68 BPM | SYSTOLIC BLOOD PRESSURE: 132 MMHG | DIASTOLIC BLOOD PRESSURE: 61 MMHG

## 2025-05-19 DIAGNOSIS — L97.525: Primary | ICD-10-CM

## 2025-05-19 DIAGNOSIS — E11.621: Primary | ICD-10-CM

## 2025-05-19 PROCEDURE — 11043 DBRDMT MUSC&/FSCA 1ST 20/<: CPT | Performed by: PODIATRIST

## 2025-05-19 NOTE — PROGRESS NOTES
[facility] [place] Progress Note    Subject: Patient was seen at wound center for follow up for his left foot. 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 Has now been discharged with a wound vac and is home. Denies pain. States feels well. Is following up with several doctors now, his PCP, a cardiologist, a diabetes educator and a pharmacist. Wonders if he should be on antibiotics. Denies N/F/V/C/D.      5/5: Follows up for his left foot. States feels well and overall is doing well. State blood sugar and pressure are much improved. Has issues with wound vac over the weekend, believes it's related to walking and going the extra day without home health. Otherwise no complaints. Does ask about a work note and a referral for another opinion to [place].     5/19: Follows up for his left foot. States things are going better with home health and the vac isn't leaking as much. States feels well overall and that blood sugar is now much better controlled. Still hasn't gotten in with [place], hoping to find out about an appt soon. Denies N/F/V/C/D.     PMH:   Past Medical History:   Diagnosis Date    Diabetes (H)     Elevated blood pressure reading without diagnosis of hypertension     Epigastric hernia     Other and unspecified hyperlipidemia     Type 2 diabetes mellitus without complications (H) 10/21/2015       Social Hx:   Social History     Socioeconomic History    Marital status: Single     Spouse name: Marlene    Number of children: 0    Years of education: Not on file    Highest education level: Not on file   Occupational History     Comment: [employer] for [employer]   Tobacco Use    Smoking status: Never     Passive exposure: Never    Smokeless tobacco: Never   Vaping Use    Vaping status: Never Used   Substance and Sexual Activity    Alcohol use: No    Drug use: No    Sexual activity: Not Currently     Partners: Female    Other Topics Concern     Service Not Asked    Blood Transfusions Not Asked    Caffeine Concern Not Asked    Occupational Exposure Not Asked    Hobby Hazards Not Asked    Sleep Concern Not Asked    Stress Concern Not Asked    Weight Concern Not Asked    Special Diet Not Asked    Back Care Not Asked    Exercise Not Asked    Bike Helmet Not Asked    Seat Belt Yes    Self-Exams Not Asked    Parent/sibling w/ CABG, MI or angioplasty before 65F 55M? Not Asked   Social History Narrative    Not on file     Social Drivers of Health     Financial Resource Strain: Low Risk  (4/21/2025)    Financial Resource Strain     Within the past 12 months, have you or your family members you live with been unable to get utilities (heat, electricity) when it was really needed?: No   Food Insecurity: Low Risk  (4/21/2025)    Food Insecurity     Within the past 12 months, did you worry that your food would run out before you got money to buy more?: No     Within the past 12 months, did the food you bought just not last and you didn t have money to get more?: No   Transportation Needs: Low Risk  (4/21/2025)    Transportation Needs     Within the past 12 months, has lack of transportation kept you from medical appointments, getting your medicines, non-medical meetings or appointments, work, or from getting things that you need?: No   Physical Activity: Not on file   Stress: Not on file   Social Connections: Not on file   Interpersonal Safety: Low Risk  (5/5/2025)    Interpersonal Safety     Do you feel physically and emotionally safe where you currently live?: Yes     Within the past 12 months, have you been hit, slapped, kicked or otherwise physically hurt by someone?: No     Within the past 12 months, have you been humiliated or emotionally abused in other ways by your partner or ex-partner?: No   Housing Stability: Low Risk  (4/21/2025)    Housing Stability     Do you have housing? : Yes     Are you worried about losing your  housing?: No       Surgical Hx:   Past Surgical History:   Procedure Laterality Date    AMPUTATE TOE(S) Left 4/8/2025    Procedure: Left foot fifth ray resection, left foot excisional debridement down to and including tendon for site measuring 6 cm x 4 cm x 2 cm;  Surgeon: Denisse Preciado DPM;  Location: [facility] OR    AMPUTATE TOE(S) Left 4/17/2025    Procedure: Repeat incision and debridement left foot wound, left fifth metatarsal bone excision;  Surgeon: Bria Pagan DPM, Podiatry/Foot and Ankle Surgery;  Location: [facility] OR    COLONOSCOPY  8/27/2013    Procedure: COLONOSCOPY;  Colonoscopy ;  Surgeon: Ash Brasher MD;  Location:  GI    HC KNEE SCOPE,REMV LOOSE BODY      right knee, left knee scoped    HERNIORRHAPHY EPIGASTRIC  12/12/2012    Procedure: HERNIORRHAPHY EPIGASTRIC;  Epigastric Hernia repair with Mesh ;  Surgeon: Viv Alcantara DO;  Location: [facility] OR    IRRIGATION AND DEBRIDEMENT FOOT, COMBINED Left 4/11/2025    Procedure: Left foot fifth metatarsal resection, left foot excisional debridement down to and including tendon, site measuring 7 cm x 4 cm x 2 cm;  Surgeon: Denisse Preciado DPM;  Location: [facility] OR       Allergies:  No Known Allergies    Medications:   Current Outpatient Medications   Medication Sig Dispense Refill    atorvastatin (LIPITOR) 40 MG tablet Take 1 tablet (40 mg) by mouth daily. 90 tablet 0    blood glucose (NO BRAND SPECIFIED) lancets standard Use to test blood sugar 3 times daily 100 Lancet 0    blood glucose (NO BRAND SPECIFIED) test strip Use to test blood sugar 3 times daily or as directed. 100 strip 0    blood glucose monitoring (NO BRAND SPECIFIED) meter device kit Use to test blood sugar 3 times daily or as directed. 1 kit 0    Continuous Glucose  (DEXCOM G7 ) ADALBERTO Use to read blood sugars as per 's instructions. (Patient not taking: Reported on 5/12/2025) 1 each 0    Continuous Glucose Sensor (DEXCOM G7 SENSOR) MISC 1 Device every 10  days. (Patient not taking: Reported on 5/12/2025) 9 each 1    hydrochlorothiazide 12.5 MG tablet Take 1 tablet (12.5 mg) by mouth daily. 30 tablet 1    insulin aspart (NOVOLOG FLEXPEN) 100 UNIT/ML pen Inject 7 Units subcutaneously 3 times daily (with meals). And extra 1 unit every 50 mg/dL. Do not exceed 40 units per day. 30 mL 0    Insulin Glargine-yfgn 100 UNIT/ML SOPN Inject 32 Units subcutaneously every morning. Plus correction and use 2 units for priming. Do not exceed 50 units per day. 60 mL 1    lisinopril (ZESTRIL) 20 MG tablet Take 1 tablet (20 mg) by mouth daily. 90 tablet 0    metoprolol succinate ER (TOPROL XL) 25 MG 24 hr tablet Take 1 tablet (25 mg) by mouth daily. 90 tablet 0    rivaroxaban ANTICOAGULANT (XARELTO) 20 MG TABS tablet Take 1 tablet (20 mg) by mouth daily (with dinner). 90 tablet 0     No current facility-administered medications for this encounter.         Objective:  Vitals:  /61 (BP Location: Right arm, Patient Position: Chair, Cuff Size: Adult Regular)   Pulse 68   Temp 97.1  F (36.2  C) (Temporal)     A1C: 12.3     General:  Patient is alert and orientated.  NAD      Dermatologic: Left foot: large dorsal ulcer with exposed tendons. Some of the wound bed has filled in and is granular. Fourth metatarsal remains exposed. No cellulitis or purulence.   .   Negative Pressure Wound Therapy Foot Left;Lateral;Anterior (Active)   Wound Type Surgical 04/19/25 0900   Dressing Pieces Applied (# of Each Type) 1;Black foam;Non-adherent contact layer 04/19/25 0900   Cycle Continuous 04/19/25 0900   Target Pressure (mmHg) 125 04/19/25 0900   [place] changed? No 04/19/25 0900       Wound (used by OP [facility] only) 04/28/25 1220 foot left diabetic ulcer (Active)   Thickness/Stage full thickness 05/19/25 1203   Base exposed structure;slough;other (see comments);granulating;yellow 05/19/25 1203   Periwound pink;macerated 05/19/25 1203   Periwound Temperature warm 05/19/25 1203   Periwound Skin  Turgor soft 05/19/25 1203   Edges open 05/19/25 1203   Length (cm) 9.9 05/19/25 1203   Width (cm) 8.4 05/19/25 1203   Depth (cm) 0.7 05/19/25 1203   Wound (cm^2) 83.16 cm^2 05/19/25 1203   Wound Volume (cm^3) 58.21 cm^3 05/19/25 1203   Wound healing % 15.83 05/19/25 1203   Undermining [Depth (cm)/Location] 5-6 oclock/ 1.4 cm 05/19/25 1203   Drainage Characteristics/Odor serosanguineous 05/19/25 1203   Drainage Amount moderate 05/19/25 1203   Care, Wound debrided 05/19/25 1203          Vascular: DP & PT pulses are intact & regular bilaterally.  No significant edema or varicosities noted.  CFT and skin temperature is normal to both lower extremities.     Neurologic: Lower extremity sensation is intact to light touch.  No evidence of weakness or contracture in the lower extremities.  No evidence of neuropathy.     Musculoskeletal: Patient is ambulatory without assistive device or brace.  No gross ankle deformity noted.  No foot or ankle joint effusion is noted.     Imaging:    IMPRESSION: Interval, now complete amputation of the fifth metatarsal. Postoperative soft tissue gas about the left lateral foot. No acute fracture. Mild scattered degenerative arthritis. Plantar calcaneal spur. Arterial calcifications      Cultures:  1+ Fusobacterium species Abnormal       Pathology (4/8/2025):      Final Diagnosis   Toe, left, fifth ray, resection-  Epidermal ulceration with associated acute osteomyelitis (see description)  Resection margin free of osteomyelitis  No evidence of malignancy      Pathology 4/11  Final Diagnosis   Bone, left foot, fifth metatarsal, biopsy-  Microscopic foci compatible with acute osteomyelitis     Imaging:   IMPRESSION: No acute fracture or osteomyelitis. Amputation of the fifth ray at the level of the TMT joint.     I personally reviewed the images and agree with the reports as stated.      Assessment:   Left foot ulcer, s/p fifth metatarsal resection and several debridements, as treatment for gas  gangrene   Diabetes Mellitus --> hba1c: 12.3     Plan:    -Discussed all findings with patient. Chart and imaging reviewed.      -Reviewed xrays with patient: no obvious osteomyelitis.     -Wound bed remains large, although some areas have improved and depth is overall less. Lateral fourth metatarsal head remains concerning for infection due to ongoing exposure. Discussed this with patient and possibly need for surgery. He's hoping still to avoid.     -Excisional debridement as below, down to and including tendon.     -Continue close monitoring. Cont wound vac. Discussed with patient signs of infection and what to watch for. None significant time.     -Follow up in approx 3 months for re evaluation. Ideally will have [place] appt by then.      Procedure:  After verbal consent, per protocol lidocaine was applied to the wound. Excisional debridement was performed on ulcer.   #15 blade was used to debride ulcer down to and including tendon and muscle belly, on the left foot. Bleeding controlled with light pressure.  No drainage noted.  Debrided site measured 4 cm x 4 cm x .7 cm > 20sq cm debrided.  Dry dressing applied to foot.  Patient tolerated procedure well.     Denisse Preciado DPM                Further instructions from your care team         05/19/2025   Justino Giordano   1960    A DME order was not completed because the supplies are ordered by home care or at a care facility  Dressing changes outside of clinic are being performed by Home Care (3 times weekly)  [facility] Phone [phone] Fax [phone]      Plan 05/19/2025   Continue NPWT   Return here in two weeks on June 2  Xray did not show bone infection; provider still has concern about 4th toe due to exposed bone   Referral sent; 5/19/25 pending review now; [place] will contact him later this week   Referral placed per your request to get an additional opinion at the [facility]   Dr. Josh Gil #[phone] --- Fax#[phone]  Do  not get foot wet; use cast protector or sponge bath  Good glucose control   Increased protein intake advised for healing support  Mobility:  Limit standing and walking; take pressure off of wound as much as possible   When walking or standing, wear your walking boot; minimize walking standing to essential only    Resumed NPWT today with nurse home care visit at 2 pm; today we placed lightly moistened VASHE gauze and ABD pad for dressing until you are seen by home care at 2 pm;   Wound dressing: Left foot  -Remove old dressing and ensure all black foam is removed  -Cleanse wound with Vashe moist gauze, leave in place for 10 minutes; remove   -Cleanse periwound skin with wound spray, pat dry and apply No Sting Barrier film.  -apply Adaptic contact layer to exposed structures prior to black foam  -Cut Black Foam to fill the depth of wound but please ensure it is not overstuffed.  -Cover wound with VAC dressing tape to seal the foam, cut appropriate size opening for the TRAC pad.  -Connect TRAC pad and connect to pump; NPWT -125 mmHg Continuous, ensure no leaks  Change canister when alarms full or weekly  Change dressing three times a week  Document on the outside of the dressing the number of sponges used and the date of the dressing  If dressing is compromised for greater than 2 hours then please remove entire dressing and change or tuck moist Vashe gauze into wound until new dressing can be applied.  May use ostomy paste for divots and crevices as needed to maintain seal.      Alternative Dressing below if Vac has to be removed during weekend or when home care unable to come:  Wound dressing: Left foot  - Remove old dressing and ensure all black foam is removed  - Apply thin layer of zinc oxide around wound    - Apply VASHE lightly moistened gauze to wound bed  - Cover with absorbant pad   - Secure with roll gauze and tape (no tape to skin)  Change dressing daily and as needed if dressing becomes soiled or  dislodged     Protein:  A diet high in protein is important for wound healing, we recommend getting 90 grams of protein per day unless medically contraindicated. Taking protein shakes or bars are a good way to get extra protein in your diet.      Elevation:  It is recommended that you elevate your legs above the level of your heart for 30 minutes: approximately 2-3 times each day. Be sure to support your knee when you elevate your leg to not cause knee pain.  Ways to do this:   - Lay on the couch or your bed and prop your legs up on pillows   - Recline back as far as you can go in your recliner and prop your legs on pillows.   Doing these things will help reduce the edema in your legs.     You do not need to change the dressing on the days you are being seen at the wound clinic     Main Provider: Denisse Preciado D.P.M. May 19, 2025    Call us at [phone] if you have any questions about your wounds, if you have redness or swelling around your wound, have a fever of 101 degrees Fahrenheit or greater or if you have any other problems or concerns. We answer the phone Monday through Friday 8 am to 4 pm, please leave a message as we check the voicemail frequently throughout the day. If you have a concern over the weekend, please leave a message and we will return your call Monday. If the need is urgent, go to the ER or urgent care.    If you had a positive experience please indicate that on your patient satisfaction survey form that [facility] will be sending you.    It was a pleasure meeting with you today.  Thank you for allowing me and my team the privilege of caring for you today.  YOU are the reason we are here, and I truly hope we provided you with the excellent service you deserve.  Please let us know if there is anything else we can do for you so that we can be sure you are leaving completely satisfied with your care experience.      If you have any billing related questions please call the [facility]  Business office at [phone]. The clinic staff does not handle billing related matters.    If you are scheduled to have a follow up appointment, you will receive a reminder call the day before your visit. On the appointment day please arrive 15 minutes prior to your appointment time. If you are unable to keep that appointment, please call the clinic to cancel or reschedule. If you are more than 10 minutes late or greater for your scheduled appointment time, the clinic policy is that you may be asked to reschedule.

## 2025-05-19 NOTE — DISCHARGE INSTRUCTIONS
05/19/2025   Justino Ireneellen   1960    A DME order was not completed because the supplies are ordered by home care or at a care facility  Dressing changes outside of clinic are being performed by Home Care (3 times weekly)  [facility] Phone [phone] Fax [phone]      Plan 05/19/2025   Continue NPWT   Return here in two weeks on June 2  Xray did not show bone infection; provider still has concern about 4th toe due to exposed bone   Referral sent; 5/19/25 pending review now; [place] will contact him later this week   Referral placed per your request to get an additional opinion at the [facility]   Dr. Josh Gli #[phone] --- Fax#[phone]  Do not get foot wet; use cast protector or sponge bath  Good glucose control   Increased protein intake advised for healing support  Mobility:  Limit standing and walking; take pressure off of wound as much as possible   When walking or standing, wear your walking boot; minimize walking standing to essential only    Resumed NPWT today with nurse home care visit at 2 pm; today we placed lightly moistened VASHE gauze and ABD pad for dressing until you are seen by home care at 2 pm;   Wound dressing: Left foot  -Remove old dressing and ensure all black foam is removed  -Cleanse wound with Vashe moist gauze, leave in place for 10 minutes; remove   -Cleanse periwound skin with wound spray, pat dry and apply No Sting Barrier film.  -apply Adaptic contact layer to exposed structures prior to black foam  -Cut Black Foam to fill the depth of wound but please ensure it is not overstuffed.  -Cover wound with VAC dressing tape to seal the foam, cut appropriate size opening for the TRAC pad.  -Connect TRAC pad and connect to pump; NPWT -125 mmHg Continuous, ensure no leaks  Change canister when alarms full or weekly  Change dressing three times a week  Document on the outside of the dressing the number of sponges used and the date of the dressing  If dressing is  compromised for greater than 2 hours then please remove entire dressing and change or tuck moist Vashe gauze into wound until new dressing can be applied.  May use ostomy paste for divots and crevices as needed to maintain seal.      Alternative Dressing below if Vac has to be removed during weekend or when home care unable to come:  Wound dressing: Left foot  - Remove old dressing and ensure all black foam is removed  - Apply thin layer of zinc oxide around wound    - Apply VASHE lightly moistened gauze to wound bed  - Cover with absorbant pad   - Secure with roll gauze and tape (no tape to skin)  Change dressing daily and as needed if dressing becomes soiled or dislodged     Protein:  A diet high in protein is important for wound healing, we recommend getting 90 grams of protein per day unless medically contraindicated. Taking protein shakes or bars are a good way to get extra protein in your diet.      Elevation:  It is recommended that you elevate your legs above the level of your heart for 30 minutes: approximately 2-3 times each day. Be sure to support your knee when you elevate your leg to not cause knee pain.  Ways to do this:   - Lay on the couch or your bed and prop your legs up on pillows   - Recline back as far as you can go in your recliner and prop your legs on pillows.   Doing these things will help reduce the edema in your legs.     You do not need to change the dressing on the days you are being seen at the wound clinic     Main Provider: Denisse Preciado D.P.M. May 19, 2025    Call us at [phone] if you have any questions about your wounds, if you have redness or swelling around your wound, have a fever of 101 degrees Fahrenheit or greater or if you have any other problems or concerns. We answer the phone Monday through Friday 8 am to 4 pm, please leave a message as we check the voicemail frequently throughout the day. If you have a concern over the weekend, please leave a message and we will  return your call Monday. If the need is urgent, go to the ER or urgent care.    If you had a positive experience please indicate that on your patient satisfaction survey form that [facility] will be sending you.    It was a pleasure meeting with you today.  Thank you for allowing me and my team the privilege of caring for you today.  YOU are the reason we are here, and I truly hope we provided you with the excellent service you deserve.  Please let us know if there is anything else we can do for you so that we can be sure you are leaving completely satisfied with your care experience.      If you have any billing related questions please call the [facility] Business office at [phone]. The clinic staff does not handle billing related matters.    If you are scheduled to have a follow up appointment, you will receive a reminder call the day before your visit. On the appointment day please arrive 15 minutes prior to your appointment time. If you are unable to keep that appointment, please call the clinic to cancel or reschedule. If you are more than 10 minutes late or greater for your scheduled appointment time, the clinic policy is that you may be asked to reschedule.

## 2025-05-21 NOTE — ADDENDUM NOTE
Encounter addended by: Denisse Preciado DPM on: 5/21/2025 2:34 PM   Actions taken: Clinical Note Signed

## 2025-05-22 ENCOUNTER — TELEPHONE (OUTPATIENT)
Dept: WOUND CARE | Facility: CLINIC | Age: 65
End: 2025-05-22
Payer: MEDICARE

## 2025-05-22 NOTE — TELEPHONE ENCOUNTER
Patient called to follow up on work letter that was supposed to go to [facility].  They say they have not received it.  Please fax to

## 2025-05-22 NOTE — TELEPHONE ENCOUNTER
Refaxed letter to [place] and notified Patient that it was sent.   No further questions or concerns.

## 2025-05-24 PROCEDURE — 93248 EXT ECG>7D<15D REV&INTERPJ: CPT | Performed by: INTERNAL MEDICINE

## 2025-05-26 ENCOUNTER — RESULTS FOLLOW-UP (OUTPATIENT)
Dept: FAMILY MEDICINE | Facility: CLINIC | Age: 65
End: 2025-05-26

## 2025-05-26 LAB — CV ZIO PRELIM RESULTS: NORMAL

## 2025-06-02 ENCOUNTER — HOSPITAL ENCOUNTER (OUTPATIENT)
Dept: WOUND CARE | Facility: CLINIC | Age: 65
Discharge: HOME | End: 2025-06-02
Attending: PODIATRIST | Admitting: PODIATRIST
Payer: MEDICARE

## 2025-06-02 VITALS — TEMPERATURE: 96.8 F | DIASTOLIC BLOOD PRESSURE: 62 MMHG | SYSTOLIC BLOOD PRESSURE: 111 MMHG | HEART RATE: 87 BPM

## 2025-06-02 DIAGNOSIS — L97.525: Primary | ICD-10-CM

## 2025-06-02 DIAGNOSIS — E11.621: Primary | ICD-10-CM

## 2025-06-02 PROCEDURE — 11042 DBRDMT SUBQ TIS 1ST 20SQCM/<: CPT | Performed by: PODIATRIST

## 2025-06-02 PROCEDURE — 11042 DBRDMT SUBQ TIS 1ST 20SQCM/<: CPT | Mod: LT | Performed by: PODIATRIST

## 2025-06-02 NOTE — PROGRESS NOTES
[facility] Progress Note    Subject: Patient was seen at wound center for follow up for his left foot. 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 Has now been discharged with a wound vac and is home. Denies pain. States feels well. Is following up with several doctors now, his PCP, a cardiologist, a diabetes educator and a pharmacist. Wonders if he should be on antibiotics. Denies N/F/V/C/D.      5/5: Follows up for his left foot. States feels well and overall is doing well. State blood sugar and pressure are much improved. Has issues with wound vac over the weekend, believes it's related to walking and going the extra day without home health. Otherwise no complaints. Does ask about a work note and a referral for another opinion to [facility].      5/19: Follows up for his left foot. States things are going better with home health and the vac isn't leaking as much. States feels well overall and that blood sugar is now much better controlled. Still hasn't gotten in with [facility], hoping to find out about an appt soon. Denies N/F/V/C/D.     6/2: Follows up for his left foot. Denies pain. Has been continuing the wound vac. States notable drainage in cannister, otherwise feels well. Has been using CAM boot and scooter.     PMH:   Past Medical History:   Diagnosis Date    Diabetes (H)     Elevated blood pressure reading without diagnosis of hypertension     Epigastric hernia     Other and unspecified hyperlipidemia     Type 2 diabetes mellitus without complications (H) 10/21/2015       Social Hx:   Social History     Socioeconomic History    Marital status: Single     Spouse name: Marlene    Number of children: 0    Years of education: Not on file    Highest education level: Not on file   Occupational History     Comment: wing and rec for [employer]   Tobacco Use    Smoking status: Never     Passive exposure: Never     Smokeless tobacco: Never   Vaping Use    Vaping status: Never Used   Substance and Sexual Activity    Alcohol use: No    Drug use: No    Sexual activity: Not Currently     Partners: Female   Other Topics Concern     Service Not Asked    Blood Transfusions Not Asked    Caffeine Concern Not Asked    Occupational Exposure Not Asked    Hobby Hazards Not Asked    Sleep Concern Not Asked    Stress Concern Not Asked    Weight Concern Not Asked    Special Diet Not Asked    Back Care Not Asked    Exercise Not Asked    Bike Helmet Not Asked    Seat Belt Yes    Self-Exams Not Asked    Parent/sibling w/ CABG, MI or angioplasty before 65F 55M? Not Asked   Social History Narrative    Not on file     Social Drivers of Health     Financial Resource Strain: Low Risk  (4/21/2025)    Financial Resource Strain     Within the past 12 months, have you or your family members you live with been unable to get utilities (heat, electricity) when it was really needed?: No   Food Insecurity: Low Risk  (4/21/2025)    Food Insecurity     Within the past 12 months, did you worry that your food would run out before you got money to buy more?: No     Within the past 12 months, did the food you bought just not last and you didn t have money to get more?: No   Transportation Needs: Low Risk  (4/21/2025)    Transportation Needs     Within the past 12 months, has lack of transportation kept you from medical appointments, getting your medicines, non-medical meetings or appointments, work, or from getting things that you need?: No   Physical Activity: Not on file   Stress: Not on file   Social Connections: Not on file   Interpersonal Safety: Low Risk  (6/2/2025)    Interpersonal Safety     Do you feel physically and emotionally safe where you currently live?: Yes     Within the past 12 months, have you been hit, slapped, kicked or otherwise physically hurt by someone?: No     Within the past 12 months, have you been humiliated or emotionally  abused in other ways by your partner or ex-partner?: No   Housing Stability: Low Risk  (4/21/2025)    Housing Stability     Do you have housing? : Yes     Are you worried about losing your housing?: No       Surgical Hx:   Past Surgical History:   Procedure Laterality Date    AMPUTATE TOE(S) Left 4/8/2025    Procedure: Left foot fifth ray resection, left foot excisional debridement down to and including tendon for site measuring 6 cm x 4 cm x 2 cm;  Surgeon: Denisse Preciado DPM;  Location: [facility] OR    AMPUTATE TOE(S) Left 4/17/2025    Procedure: Repeat incision and debridement left foot wound, left fifth metatarsal bone excision;  Surgeon: Bria Pagan DPM, Podiatry/Foot and Ankle Surgery;  Location: [facility] OR    COLONOSCOPY  8/27/2013    Procedure: COLONOSCOPY;  Colonoscopy ;  Surgeon: Ash Brasher MD;  Location:  GI    HC KNEE SCOPE,REMV LOOSE BODY      right knee, left knee scoped    HERNIORRHAPHY EPIGASTRIC  12/12/2012    Procedure: HERNIORRHAPHY EPIGASTRIC;  Epigastric Hernia repair with Mesh ;  Surgeon: Viv Alcantara DO;  Location:  OR    IRRIGATION AND DEBRIDEMENT FOOT, COMBINED Left 4/11/2025    Procedure: Left foot fifth metatarsal resection, left foot excisional debridement down to and including tendon, site measuring 7 cm x 4 cm x 2 cm;  Surgeon: Denisse Preciado DPM;  Location: [facility] OR       Allergies:  No Known Allergies    Medications:   Current Outpatient Medications   Medication Sig Dispense Refill    atorvastatin (LIPITOR) 40 MG tablet Take 1 tablet (40 mg) by mouth daily. 90 tablet 0    blood glucose (NO BRAND SPECIFIED) lancets standard Use to test blood sugar 3 times daily 100 Lancet 0    blood glucose (NO BRAND SPECIFIED) test strip Use to test blood sugar 3 times daily or as directed. 100 strip 0    blood glucose monitoring (NO BRAND SPECIFIED) meter device kit Use to test blood sugar 3 times daily or as directed. 1 kit 0    Continuous Glucose  (DEXCOM G7 ) ADALBERTO  Use to read blood sugars as per 's instructions. (Patient not taking: Reported on 5/12/2025) 1 each 0    Continuous Glucose Sensor (DEXCOM G7 SENSOR) MISC 1 Device every 10 days. (Patient not taking: Reported on 5/12/2025) 9 each 1    hydrochlorothiazide 12.5 MG tablet Take 1 tablet (12.5 mg) by mouth daily. 30 tablet 1    insulin aspart (NOVOLOG FLEXPEN) 100 UNIT/ML pen Inject 7 Units subcutaneously 3 times daily (with meals). And extra 1 unit every 50 mg/dL. Do not exceed 40 units per day. 30 mL 0    Insulin Glargine-yfgn 100 UNIT/ML SOPN Inject 32 Units subcutaneously every morning. Plus correction and use 2 units for priming. Do not exceed 50 units per day. 60 mL 1    lisinopril (ZESTRIL) 20 MG tablet Take 1 tablet (20 mg) by mouth daily. 90 tablet 0    metoprolol succinate ER (TOPROL XL) 25 MG 24 hr tablet Take 1 tablet (25 mg) by mouth daily. 90 tablet 0    rivaroxaban ANTICOAGULANT (XARELTO) 20 MG TABS tablet Take 1 tablet (20 mg) by mouth daily (with dinner). 90 tablet 0     No current facility-administered medications for this encounter.         Objective:  Vitals:  /62 (BP Location: Left arm, Patient Position: Sitting, Cuff Size: Adult Regular)   Pulse 87   Temp 96.8  F (36  C) (Temporal)     A1C: 12.3     General:  Patient is alert and orientated.  NAD      Dermatologic: Left foot: large dorsal ulcer with exposed tendons. Some of the wound bed has filled in and is granular. Fourth metatarsal remains exposed. No cellulitis or purulence. --> improved. Less tendon exposure. Less erythema to periwound. Still exposed fourth metatarsal.   .   Negative Pressure Wound Therapy Foot Left;Lateral;Anterior (Active)       Wound (used by OP [facility] only) 04/28/25 1220 foot left diabetic ulcer (Active)   Thickness/Stage full thickness 06/02/25 1227   Base exposed structure;slough;other (see comments);granulating;yellow 06/02/25 1227   Periwound pink;macerated 06/02/25 1227   Periwound Temperature warm  06/02/25 1227   Periwound Skin Turgor soft 06/02/25 1227   Edges open 06/02/25 1227   Length (cm) 9.9 05/19/25 1203   Width (cm) 8.4 05/19/25 1203   Depth (cm) 0.7 05/19/25 1203   Wound (cm^2) 83.16 cm^2 05/19/25 1203   Wound Volume (cm^3) 58.21 cm^3 05/19/25 1203   Wound healing % 15.83 05/19/25 1203   Undermining [Depth (cm)/Location] 5-6 oclock/ 1.4 cm 05/19/25 1203   Drainage Characteristics/Odor serosanguineous 06/02/25 1227   Drainage Amount moderate 06/02/25 1227   Care, Wound debrided 05/19/25 1203          Vascular: DP & PT pulses are intact & regular bilaterally.  No significant edema or varicosities noted.  CFT and skin temperature is normal to both lower extremities.     Neurologic: Lower extremity sensation is intact to light touch.  No evidence of weakness or contracture in the lower extremities.  No evidence of neuropathy.     Musculoskeletal: Patient is ambulatory without assistive device or brace.  No gross ankle deformity noted.  No foot or ankle joint effusion is noted.     Imaging:    IMPRESSION: Interval, now complete amputation of the fifth metatarsal. Postoperative soft tissue gas about the left lateral foot. No acute fracture. Mild scattered degenerative arthritis. Plantar calcaneal spur. Arterial calcifications      Cultures:  1+ Fusobacterium species Abnormal      Pathology (4/8/2025):      Final Diagnosis   Toe, left, fifth ray, resection-  Epidermal ulceration with associated acute osteomyelitis (see description)  Resection margin free of osteomyelitis  No evidence of malignancy      Pathology 4/11  Final Diagnosis   Bone, left foot, fifth metatarsal, biopsy-  Microscopic foci compatible with acute osteomyelitis      Imaging:   IMPRESSION: No acute fracture or osteomyelitis. Amputation of the fifth ray at the level of the TMT joint.     I personally reviewed the images and agree with the reports as stated.       Assessment:   Left foot ulcer, s/p fifth metatarsal resection and several  debridements, as treatment for gas gangrene   Diabetes Mellitus --> hba1c: 12.3     Plan:    -Discussed all findings with patient. Chart and imaging reviewed.     -Left foot evaluated: overall improved. Much less tendon exposure. Wound bed granular and filling in. Fourth metatarsal still exposed, but does have some areas of tissue coverage. Overall site improved and without signs of infection.     -Discussed with patient that he may not end up needing surgery, but will continue monitoring the fourth metatarsal for signs of infection, given site has been exposed for a long time.     -Excisional debridement as below, majority of which subcu tissue.     -Follow up in 3 - 4 weeks. Will likely stop the wound vac at that time.       Procedure:  After verbal consent, per protocol lidocaine was applied to the wound. Excisional debridement was performed on ulcer.   #15 blade was used to debride ulcer down to and including subcutaneous tissue, on the left foot. Bleeding controlled with light pressure.  No drainage noted.  Debrided site measured 5 cm x 4 cm x 1 cm < 20sq cm debrided.  Dry dressing applied to foot.  Patient tolerated procedure well.    Denisse Preciado DPM            Further instructions from your care team         06/02/2025   Justino Giordano   1960    A DME order was not completed because the supplies are ordered by home care or at a care facility  Dressing changes outside of clinic are being performed by Home Care (3 times weekly)  [facility] Phone [phone] Fax [phone]     Plan 06/02/2025   Continue NPWT   Return here in two weeks on June 2  Xray did not show bone infection; provider still has concern about 4th toe due to exposed bone   Referral sent; 5/19/25 pending review now; [place] will contact him later this week   Referral placed per your request to get an additional opinion at the [facility]   Dr. Josh Gil #[phone] --- Fax#[phone]  Do not get foot wet; use cast  protector or sponge bath  Good glucose control   Increased protein intake advised for healing support  Mobility:  Limit standing and walking; take pressure off of wound as much as possible   When walking or standing, wear your walking boot; minimize walking standing to essential only     Resumed NPWT today with nurse home care visit at 2 pm; today we placed lightly moistened VASHE gauze and ABD pad for dressing until you are seen by home care at 2 pm;   Wound dressing: Left foot  -Remove old dressing and ensure all black foam is removed  -Cleanse wound with Vashe moist gauze, leave in place for 10 minutes; remove   -Cleanse periwound skin with wound spray, pat dry and apply No Sting Barrier film.  -apply Adaptic contact layer to exposed structures prior to black foam  -Cut Black Foam to fill the depth of wound but please ensure it is not overstuffed.  -Cover wound with VAC dressing tape to seal the foam, cut appropriate size opening for the TRAC pad.  -Connect TRAC pad and connect to pump; NPWT -125 mmHg Continuous, ensure no leaks  Change canister when alarms full or weekly  Change dressing three times a week  Document on the outside of the dressing the number of sponges used and the date of the dressing  If dressing is compromised for greater than 2 hours then please remove entire dressing and change or tuck moist Vashe gauze into wound until new dressing can be applied.  May use ostomy paste for divots and crevices as needed to maintain seal.      Alternative Dressing below if Vac has to be removed during weekend or when home care unable to come:  Wound dressing: Left foot  - Remove old dressing and ensure all black foam is removed  - Apply thin layer of zinc oxide around wound    - Apply VASHE lightly moistened gauze to wound bed  - Cover with absorbant pad   - Secure with roll gauze and tape (no tape to skin)  Change dressing daily and as needed if dressing becomes soiled or dislodged     Protein:  A diet high  in protein is important for wound healing, we recommend getting 90 grams of protein per day unless medically contraindicated. Taking protein shakes or bars are a good way to get extra protein in your diet.       Elevation:  It is recommended that you elevate your legs above the level of your heart for 30 minutes: approximately 2-3 times each day. Be sure to support your knee when you elevate your leg to not cause knee pain.  Ways to do this:   - Lay on the couch or your bed and prop your legs up on pillows   - Recline back as far as you can go in your recliner and prop your legs on pillows.   Doing these things will help reduce the edema in your legs.      You do not need to change the dressing on the days you are being seen at the wound clinic     Main Provider: Denisse Preciado D.P.M. June 2, 2025    Call us at [phone] if you have any questions about your wounds, if you have redness or swelling around your wound, have a fever of 101 degrees Fahrenheit or greater or if you have any other problems or concerns. We answer the phone Monday through Friday 8 am to 4 pm, please leave a message as we check the voicemail frequently throughout the day. If you have a concern over the weekend, please leave a message and we will return your call Monday. If the need is urgent, go to the ER or urgent care.    If you had a positive experience please indicate that on your patient satisfaction survey form that [facility] will be sending you.    It was a pleasure meeting with you today.  Thank you for allowing me and my team the privilege of caring for you today.  YOU are the reason we are here, and I truly hope we provided you with the excellent service you deserve.  Please let us know if there is anything else we can do for you so that we can be sure you are leaving completely satisfied with your care experience.      If you have any billing related questions please call the [facility] Business office at [phone]. The  clinic staff does not handle billing related matters.    If you are scheduled to have a follow up appointment, you will receive a reminder call the day before your visit. On the appointment day please arrive 15 minutes prior to your appointment time. If you are unable to keep that appointment, please call the clinic to cancel or reschedule. If you are more than 10 minutes late or greater for your scheduled appointment time, the clinic policy is that you may be asked to reschedule.

## 2025-06-02 NOTE — DISCHARGE INSTRUCTIONS
06/02/2025   Justino Giordano   1960    A DME order was not completed because the supplies are ordered by home care or at a care facility  Dressing changes outside of clinic are being performed by Home Care (3 times weekly)  [facility] Phone [phone] Fax [phone]     Plan 06/02/2025   We anticipate a few more weeks and then we will discontinue the vac  Do not get foot wet; use cast protector or sponge bath  Good glucose control     Resume NPWT today with home care visit; today we placed lightly moistened VASHE gauze and ABD pad for dressing until you are seen by home care at 2 pm;   Wound dressing: Left foot  -Remove old dressing and ensure all black foam is removed  -Cleanse wound with Vashe moist gauze, leave in place for 10 minutes; remove   -Cleanse periwound skin with wound spray, pat dry and apply No Sting Barrier film.  -apply Adaptic contact layer to exposed structures prior to black foam  -Cut Black Foam to fill the depth of wound but please ensure it is not overstuffed.  -Cover wound with VAC dressing tape to seal the foam, cut appropriate size opening for the TRAC pad.  -Connect TRAC pad and connect to pump; NPWT -125 mmHg Continuous, ensure no leaks  Change canister when alarms full or weekly  Change dressing three times a week  Document on the outside of the dressing the number of sponges used and the date of the dressing  If dressing is compromised for greater than 2 hours then please remove entire dressing and change or tuck moist Vashe gauze into wound until new dressing can be applied.  May use ostomy paste for divots and crevices as needed to maintain seal.      Alternative Dressing below if Vac has to be removed during weekend or when home care unable to come:  Wound dressing: Left foot  - Remove old dressing and ensure all black foam is removed  - Apply thin layer of zinc oxide around wound    - Apply VASHE lightly moistened gauze to wound bed  - Cover with absorbant pad   -  Secure with roll gauze and tape (no tape to skin)  Change dressing daily and as needed if dressing becomes soiled or dislodged     Protein:  A diet high in protein is important for wound healing, we recommend getting 90 grams of protein per day unless medically contraindicated. Taking protein shakes or bars are a good way to get extra protein in your diet.       Elevation:  It is recommended that you elevate your legs above the level of your heart for 30 minutes: approximately 2-3 times each day. Be sure to support your knee when you elevate your leg to not cause knee pain.  Ways to do this:   - Lay on the couch or your bed and prop your legs up on pillows   - Recline back as far as you can go in your recliner and prop your legs on pillows.   Doing these things will help reduce the edema in your legs.     Mobility:  Limit standing and walking; take pressure off of wound as much as possible   When walking or standing, wear your walking boot; minimize walking standing to essential only     Main Provider: DAPHNIE Alves.P.M. June 2, 2025    Call us at [phone] if you have any questions about your wounds, if you have redness or swelling around your wound, have a fever of 101 degrees Fahrenheit or greater or if you have any other problems or concerns. We answer the phone Monday through Friday 8 am to 4 pm, please leave a message as we check the voicemail frequently throughout the day. If you have a concern over the weekend, please leave a message and we will return your call Monday. If the need is urgent, go to the ER or urgent care.    If you had a positive experience please indicate that on your patient satisfaction survey form that [facility] will be sending you.    It was a pleasure meeting with you today.  Thank you for allowing me and my team the privilege of caring for you today.  YOU are the reason we are here, and I truly hope we provided you with the excellent service you deserve.  Please let us  know if there is anything else we can do for you so that we can be sure you are leaving completely satisfied with your care experience.      If you have any billing related questions please call the [facility] Business office at [phone]. The clinic staff does not handle billing related matters.    If you are scheduled to have a follow up appointment, you will receive a reminder call the day before your visit. On the appointment day please arrive 15 minutes prior to your appointment time. If you are unable to keep that appointment, please call the clinic to cancel or reschedule. If you are more than 10 minutes late or greater for your scheduled appointment time, the clinic policy is that you may be asked to reschedule.

## 2025-06-03 NOTE — ADDENDUM NOTE
Encounter addended by: Meri Velazquez RN on: 6/3/2025 8:58 AM   Actions taken: Edited Discharge Instructions

## 2025-06-04 DIAGNOSIS — Z79.4 TYPE 2 DIABETES MELLITUS WITH DIABETIC PERIPHERAL ANGIOPATHY AND GANGRENE, WITH LONG-TERM CURRENT USE OF INSULIN (H): Primary | ICD-10-CM

## 2025-06-04 DIAGNOSIS — E11.52 TYPE 2 DIABETES MELLITUS WITH DIABETIC PERIPHERAL ANGIOPATHY AND GANGRENE, WITH LONG-TERM CURRENT USE OF INSULIN (H): Primary | ICD-10-CM

## 2025-06-04 RX ORDER — PEN NEEDLE, DIABETIC 30 GX3/16"
1 NEEDLE, DISPOSABLE MISCELLANEOUS 4 TIMES DAILY
COMMUNITY
End: 2025-06-04

## 2025-06-04 NOTE — TELEPHONE ENCOUNTER
Called pt. Pt is still needing these needles. Confirmed sizing with pt.     Routing to Adrian to review.     Mitzy Roberson RN   [facility]

## 2025-06-04 NOTE — TELEPHONE ENCOUNTER
Clinic RN: Please investigate patient's chart or contact patient if the information cannot be found because the medication is listed as historical or discontinued. Confirm patient is taking this medication. Document findings and route refill encounter to provider for approval or denial.    Thank you  RICKEY Romero

## 2025-06-05 ENCOUNTER — ALLIED HEALTH/NURSE VISIT (OUTPATIENT)
Dept: FAMILY MEDICINE | Facility: CLINIC | Age: 65
End: 2025-06-05
Payer: MEDICARE

## 2025-06-05 DIAGNOSIS — Z23 NEED FOR VACCINATION: Primary | ICD-10-CM

## 2025-06-05 RX ORDER — PEN NEEDLE, DIABETIC 30 GX3/16"
1 NEEDLE, DISPOSABLE MISCELLANEOUS 4 TIMES DAILY
Qty: 100 EACH | Refills: 1 | Status: SHIPPED | OUTPATIENT
Start: 2025-06-05

## 2025-06-09 ENCOUNTER — OFFICE VISIT (OUTPATIENT)
Dept: PHARMACY | Facility: CLINIC | Age: 65
End: 2025-06-09

## 2025-06-09 ENCOUNTER — RESULTS FOLLOW-UP (OUTPATIENT)
Dept: PHARMACY | Facility: CLINIC | Age: 65
End: 2025-06-09

## 2025-06-09 ENCOUNTER — LAB (OUTPATIENT)
Dept: LAB | Facility: CLINIC | Age: 65
End: 2025-06-09
Payer: MEDICARE

## 2025-06-09 VITALS
DIASTOLIC BLOOD PRESSURE: 50 MMHG | BODY MASS INDEX: 30.15 KG/M2 | HEART RATE: 63 BPM | WEIGHT: 241.2 LBS | SYSTOLIC BLOOD PRESSURE: 81 MMHG | OXYGEN SATURATION: 100 %

## 2025-06-09 DIAGNOSIS — E11.52 TYPE 2 DIABETES MELLITUS WITH DIABETIC PERIPHERAL ANGIOPATHY AND GANGRENE, WITH LONG-TERM CURRENT USE OF INSULIN (H): Primary | ICD-10-CM

## 2025-06-09 DIAGNOSIS — I10 ESSENTIAL HYPERTENSION, BENIGN: ICD-10-CM

## 2025-06-09 DIAGNOSIS — Z79.4 TYPE 2 DIABETES MELLITUS WITH DIABETIC PERIPHERAL ANGIOPATHY AND GANGRENE, WITH LONG-TERM CURRENT USE OF INSULIN (H): ICD-10-CM

## 2025-06-09 DIAGNOSIS — E11.52 TYPE 2 DIABETES MELLITUS WITH DIABETIC PERIPHERAL ANGIOPATHY AND GANGRENE, WITH LONG-TERM CURRENT USE OF INSULIN (H): ICD-10-CM

## 2025-06-09 DIAGNOSIS — I48.92 ATRIAL FLUTTER, UNSPECIFIED TYPE (H): ICD-10-CM

## 2025-06-09 DIAGNOSIS — Z79.4 TYPE 2 DIABETES MELLITUS WITH DIABETIC PERIPHERAL ANGIOPATHY AND GANGRENE, WITH LONG-TERM CURRENT USE OF INSULIN (H): Primary | ICD-10-CM

## 2025-06-09 LAB
ANION GAP SERPL CALCULATED.3IONS-SCNC: 14 MMOL/L (ref 7–15)
BUN SERPL-MCNC: 16.6 MG/DL (ref 8–23)
CALCIUM SERPL-MCNC: 9.6 MG/DL (ref 8.8–10.4)
CHLORIDE SERPL-SCNC: 98 MMOL/L (ref 98–107)
CREAT SERPL-MCNC: 1.11 MG/DL (ref 0.67–1.17)
EGFRCR SERPLBLD CKD-EPI 2021: 74 ML/MIN/1.73M2
EST. AVERAGE GLUCOSE BLD GHB EST-MCNC: 151 MG/DL
GLUCOSE SERPL-MCNC: 160 MG/DL (ref 70–99)
HBA1C MFR BLD: 6.9 % (ref 0–5.6)
HCO3 SERPL-SCNC: 22 MMOL/L (ref 22–29)
POTASSIUM SERPL-SCNC: 3.7 MMOL/L (ref 3.4–5.3)
SODIUM SERPL-SCNC: 134 MMOL/L (ref 135–145)

## 2025-06-09 PROCEDURE — 36415 COLL VENOUS BLD VENIPUNCTURE: CPT

## 2025-06-09 PROCEDURE — 80048 BASIC METABOLIC PNL TOTAL CA: CPT

## 2025-06-09 PROCEDURE — 83036 HEMOGLOBIN GLYCOSYLATED A1C: CPT

## 2025-06-09 PROCEDURE — 99207 PR NO CHARGE LOS: CPT | Performed by: PHARMACIST

## 2025-06-09 NOTE — PATIENT INSTRUCTIONS
"Recommendations from today's MTM visit:                                                       Schedule a diabetes eye exam. You don't have to wait until your annual.   Continue insulin with no change.  Stop at lab and have your kidney function and urine albumin checked.  Talk to the cardiology provider about your light headedness and see if she would like to reduce any of your blood pressure medicines.   Check blood pressure at home today and if you have any concerns about low readings or increase in lightheadedness call and talk to triage.     Follow-up: Return in 7 weeks (on 7/30/2025).    It was great speaking with you today.  I value your experience and would be very thankful for your time in providing feedback in our clinic survey. In the next few days, you may receive an email or text message from [employer] with a link to a survey related to your  clinical pharmacist.\"     To schedule another MTM appointment, please call the clinic directly or you may call the MTM scheduling line at [phone] or toll-free at [phone].     My Clinical Pharmacist's contact information:                                                      Please feel free to contact me with any questions or concerns you have.      Promise Doty, PharmD  Medication Therapy Management Pharmacist  [facility] - Monday and Wednesday 7:30 - 4:00    "

## 2025-06-09 NOTE — PROGRESS NOTES
Medication Therapy Management (MTM) Encounter    ASSESSMENT:                            Medication Adherence/Access: No issues identified.    Diabetes  Patient is meeting A1c goal of < 7%.  Patient is meeting goal of > 70% time in target with continuous glucose monitoring.   Patient would benefit from updated labs.   Continue on basal and mealtime insulin without change today since blood sugars fasting and postprandial are at goal with no hypoglycemia. Would recommend A1C goal < 7% and aggressive control to assist with wound healing. Insulin is the best option for him right now to get under tight control but could consider other pharmacotherapy options other than insulin in the future.     Hypertension and atrial flutter  Blood pressure at goal and lower today with minimal symptoms and last couple of blood pressure higher but still at goal.Home blood pressures are at goal and not low. I called him after our visit and home blood pressure was up and normal and he was feeling better after some water.  He will be seeing cardiology tomorrow and if blood pressure remains low in the mornings would recommend adjusting medications. Should consider stopping hydrochlorothiazide to help with sodium which remains slightly low. I asked him to reach out if he starts to have symptoms of hypotension or concerned about home readings.     PLAN:                            Schedule a diabetes eye exam. You don't have to wait until your annual.   Continue insulin with no change.  Stop at lab and have your kidney function and urine albumin checked.  Talk to the cardiology provider about your light headedness and see if she would like to reduce any of your blood pressure medicines.   Check blood pressure at home today and if you have any concerns about low readings or increase in lightheadedness call and talk to triage.     Follow-up: Return in 7 weeks (on 7/30/2025).    SUBJECTIVE/OBJECTIVE:                          Justino Giordano is a 65  year old male seen for a follow-up visit from 5/12/25.       Reason for visit: follow-up diabetes.    Allergies/ADRs: Reviewed in chart  Past Medical History: Reviewed in chart  Tobacco: He reports that he has never smoked. He has never been exposed to tobacco smoke. He has never used smokeless tobacco.  Alcohol: not currently using    Medication Adherence/Access: no issues reported.    Diabetes   Lantus 32 units daily morning  Novolog 7 units plus the following correction scale - using about 21 units daily    <70 mg/dL:           take no insulin injections  70-99 mg/dL:        minus 2 units   100-150 mg/dL:    no adjustment = 7 units  150-199 mg/dL:    add 1 units (= 8 units)  200-249 mg/dL:    add 2 units  (= 9 units)  250-299 mg/dL:    add 3 units  (= 10 units)  >300 mg/dL:         add 4 units and call your doctor  (= 11 units)    He reports no low blood sugars.     Diet/Exercise:   He had visit with diabetes education and has follow-up in July  No change since last visit - copied forward. Use to only eat 3 meals a day. Doesn't snack between meals.  Trying to eat low carb meals and small portions. He eats sugar free treats if he does have them. He eats lots of vegetables. Activity is limited due to his foot wound healing. He is not supposed to be on his foot for long.       Blood sugar monitoring: Continuous Glucose Monitor see AGP below      Eye exam in the last 12 months? No  Foot exam: due    Hypertension and atrial flutter  Hydrochlorothiazide 12.5 mg daily - decreased at last visit  Lisinopril 20 mg daily   Metoprolol ER 25 mg daily   Xarelto 20 mg in the evening    Patient reports maybe being a little light headed/foggy but not concerning and better than before reducing hydrochlorothiazide at last visit. Reports home blood pressure readings 112-120/60-70's.  I did call him back after our visit and his home reading checked with automatic cuff was 112/72 and he was feeling fine. He drank some water and said  he felt better.  Denies any symptoms palpitation/flutter.     [id number] Score = 3               Today's Vitals: BP (!) 81/50   Pulse 63   Wt 241 lb 3.2 oz (109.4 kg)   SpO2 100%   BMI 30.15 kg/m    ----------------      I spent 30 minutes with this patient today. All changes were made via collaborative practice agreement with Adrian Ellison PA-C.     A summary of these recommendations was given to the patient.    Promise Doty, PharmD  Medication Therapy Management Pharmacist     Medication Therapy Recommendations  No medication therapy recommendations to display

## 2025-06-10 ENCOUNTER — OFFICE VISIT (OUTPATIENT)
Dept: CARDIOLOGY | Facility: CLINIC | Age: 65
End: 2025-06-10
Attending: PHYSICIAN ASSISTANT
Payer: MEDICARE

## 2025-06-10 VITALS
DIASTOLIC BLOOD PRESSURE: 70 MMHG | WEIGHT: 241 LBS | HEIGHT: 75 IN | HEART RATE: 68 BPM | SYSTOLIC BLOOD PRESSURE: 94 MMHG | BODY MASS INDEX: 29.97 KG/M2

## 2025-06-10 DIAGNOSIS — I48.92 ATRIAL FLUTTER, PAROXYSMAL (H): ICD-10-CM

## 2025-06-10 DIAGNOSIS — E11.52 TYPE 2 DIABETES MELLITUS WITH DIABETIC PERIPHERAL ANGIOPATHY AND GANGRENE, WITH LONG-TERM CURRENT USE OF INSULIN (H): ICD-10-CM

## 2025-06-10 DIAGNOSIS — Z79.4 TYPE 2 DIABETES MELLITUS WITH DIABETIC PERIPHERAL ANGIOPATHY AND GANGRENE, WITH LONG-TERM CURRENT USE OF INSULIN (H): ICD-10-CM

## 2025-06-10 NOTE — LETTER
6/10/2025    Adrian Ellison PA-C  [id number]  [facility] [address]    RE: Justino Giordano       Dear Colleague,     I had the pleasure of seeing Justino Giordano in the [facility].  HPI and Plan:   Justino Giordano is a 65 year old male who presents with history of diabetes type 2, hypertension, hyperlipidemia and recent multiple surgeries with I&D and left fifth metatarsal bone excision for gas gangrene of the foot in April.  He was noted to be in atrial fibrillation at that time, he underwent an echocardiogram which showed low normal LV systolic function with an EF estimated at 50 to 55% no significant valve disease.  He was started on metoprolol and Xarelto for CVA prophylaxis after his surgery.  He did undergo a Zio patch monitor about a month later, which showed 77% burden of paroxysmal atrial fibrillation.  He has been working on diet and lifestyle changes as well as medication to lower his blood sugars quite impressively from a hemoglobin A1c in April 2012 0.3 down to 6.9 in June.  He was started on atorvastatin 40 mg in April and has a follow-up visit with PMD in July for repeat cholesterol profile.  He is feeling very well, energy levels have improved.  He denies feeling palpitations, chest pain or shortness of breath.  He does occasionally get lightheaded and has noted his blood pressures have been low.  In May and yesterday blood pressures were in the 80s today in office was 94/70.  A basic metabolic panel drawn yesterday shows improvement in sodium levels but still slightly low at 134.  Creatinine 1.11 and potassium 3.7.  He is maintained on low-dose hydrochlorothiazide since his hospitalization.    On exam cardiovascular tones are irregularly irregular consistent with persistent atrial fibrillation    Summary    1.  Persistent atrial xrebchjzacbc-[id number] score is 5 indicating high risk for cerebral embolic events related to atrial fibrillation.  He is on Xarelto and  tolerating this without bleeding complication.  He is also on metoprolol for rate control with adequate control.  We discussed rate versus rhythm control today.  Given that he is asymptomatic and continually improving including energy levels, after discussion we opted to pursue rate control with his current medications.    2.  Hyperlipidemia-on atorvastatin, started in April and has follow-up with PMD in July for repeat cholesterol.  Recommend LDL less than 70 given high risk features    3.  Hypertension-he has been actually hypotensive with the addition of hydrochlorothiazide and also slightly hyponatremic.  Recommend stopping hydrochlorothiazide at this time, he will continue with both metoprolol and lisinopril    Please feel free to contact me with any questions given regards to his care       Today's clinic visit entailed:  Review of external notes as documented elsewhere in note  Review of the result(s) of each unique test - echo, ECG, ziopatch  Prescription drug management    Provider  Link to [facility] Help Grid     The level of medical decision making during this visit was of moderate complexity.    No orders of the defined types were placed in this encounter.    No orders of the defined types were placed in this encounter.    Medications Discontinued During This Encounter   Medication Reason     hydrochlorothiazide 12.5 MG tablet          Encounter Diagnoses   Name Primary?     Type 2 diabetes mellitus with diabetic peripheral angiopathy and gangrene, with long-term current use of insulin (H)      Atrial flutter, paroxysmal (H)        CURRENT MEDICATIONS:  Current Outpatient Medications   Medication Sig Dispense Refill     atorvastatin (LIPITOR) 40 MG tablet Take 1 tablet (40 mg) by mouth daily. 90 tablet 0     blood glucose (NO BRAND SPECIFIED) lancets standard Use to test blood sugar 3 times daily 100 Lancet 0     blood glucose (NO BRAND SPECIFIED) test strip Use to test blood sugar 3 times daily or as  directed. 100 strip 0     blood glucose monitoring (NO BRAND SPECIFIED) meter device kit Use to test blood sugar 3 times daily or as directed. 1 kit 0     Continuous Glucose  (DEXCOM G7 ) ADALBERTO Use to read blood sugars as per 's instructions. 1 each 0     Continuous Glucose Sensor (DEXCOM G7 SENSOR) MISC 1 Device every 10 days. 9 each 1     insulin aspart (NOVOLOG FLEXPEN) 100 UNIT/ML pen Inject 7 Units subcutaneously 3 times daily (with meals). And extra 1 unit every 50 mg/dL. Do not exceed 40 units per day. 30 mL 0     Insulin Glargine-yfgn 100 UNIT/ML SOPN Inject 32 Units subcutaneously every morning. Plus correction and use 2 units for priming. Do not exceed 50 units per day. 60 mL 1     Insulin Pen Needle (PEN NEEDLES) 31G X 5 MM MISC 1 Pen Needle 4 times daily. 100 each 1     lisinopril (ZESTRIL) 20 MG tablet Take 1 tablet (20 mg) by mouth daily. 90 tablet 0     metoprolol succinate ER (TOPROL XL) 25 MG 24 hr tablet Take 1 tablet (25 mg) by mouth daily. 90 tablet 0     rivaroxaban ANTICOAGULANT (XARELTO) 20 MG TABS tablet Take 1 tablet (20 mg) by mouth daily (with dinner). 90 tablet 0       ALLERGIES   No Known Allergies    PAST MEDICAL HISTORY:  Past Medical History:   Diagnosis Date     Diabetes (H)      Elevated blood pressure reading without diagnosis of hypertension      Epigastric hernia      Other and unspecified hyperlipidemia      Type 2 diabetes mellitus without complications (H) 10/21/2015       PAST SURGICAL HISTORY:  Past Surgical History:   Procedure Laterality Date     AMPUTATE TOE(S) Left 4/8/2025    Procedure: Left foot fifth ray resection, left foot excisional debridement down to and including tendon for site measuring 6 cm x 4 cm x 2 cm;  Surgeon: Denisse Preciado DPM;  Location: [facility] OR     AMPUTATE TOE(S) Left 4/17/2025    Procedure: Repeat incision and debridement left foot wound, left fifth metatarsal bone excision;  Surgeon: Bria Pagan DPM, Podiatry/Foot  and Ankle Surgery;  Location: [facility] OR     COLONOSCOPY  8/27/2013    Procedure: COLONOSCOPY;  Colonoscopy ;  Surgeon: Ash Brasher MD;  Location: [facility] GI     HC KNEE SCOPE,REMV LOOSE BODY      right knee, left knee scoped     HERNIORRHAPHY EPIGASTRIC  12/12/2012    Procedure: HERNIORRHAPHY EPIGASTRIC;  Epigastric Hernia repair with Mesh ;  Surgeon: Viv Alcantara DO;  Location: [facility] OR     IRRIGATION AND DEBRIDEMENT FOOT, COMBINED Left 4/11/2025    Procedure: Left foot fifth metatarsal resection, left foot excisional debridement down to and including tendon, site measuring 7 cm x 4 cm x 2 cm;  Surgeon: Denisse Preciado DPM;  Location: [facility] OR       FAMILY HISTORY:  Family History   Problem Relation Age of Onset     Heart Disease Father         heart attack age 59     C.A.D. Father      Heart Disease Mother         heart attack age 57     C.A.D. Mother      Diabetes Sister        SOCIAL HISTORY:  Social History     Socioeconomic History     Marital status: Single     Spouse name: Marlene     Number of children: 0     Years of education: None     Highest education level: None   Occupational History     Comment: [employer] for [employer]   Tobacco Use     Smoking status: Never     Passive exposure: Never     Smokeless tobacco: Never   Vaping Use     Vaping status: Never Used   Substance and Sexual Activity     Alcohol use: No     Drug use: No     Sexual activity: Not Currently     Partners: Female   Other Topics Concern     Seat Belt Yes     Social Drivers of Health     Financial Resource Strain: Low Risk  (4/21/2025)    Financial Resource Strain      Within the past 12 months, have you or your family members you live with been unable to get utilities (heat, electricity) when it was really needed?: No   Food Insecurity: No Food Insecurity (6/7/2025)    Received from [facility]    Hunger Vital Sign      Worried About Running Out of Food in the Last Year: Never true      Ran Out of Food in the Last Year: Never  "true   Transportation Needs: No Transportation Needs (6/7/2025)    Received from [facility]    PRAPARE - Transportation      Lack of Transportation (Medical): No      Lack of Transportation (Non-Medical): No   Physical Activity: Unknown (6/7/2025)    Received from [facility]    Exercise Vital Sign      Days of Exercise per Week: 0 days      Minutes of Exercise per Session: Patient declined   Interpersonal Safety: Low Risk  (6/2/2025)    Interpersonal Safety      Do you feel physically and emotionally safe where you currently live?: Yes      Within the past 12 months, have you been hit, slapped, kicked or otherwise physically hurt by someone?: No      Within the past 12 months, have you been humiliated or emotionally abused in other ways by your partner or ex-partner?: No   Housing Stability: Low Risk  (6/7/2025)    Received from [facility]    Housing Stability      What is your living situation today?: I have a steady place to live       Review of Systems:  Skin:  not assessed     Eyes:  not assessed    ENT:  not assessed    Respiratory:  Negative    Cardiovascular:    Positive for, lightheadedness, fatigue  Gastroenterology: not assessed    Genitourinary:  not assessed    Musculoskeletal:  not assessed    Neurologic:  not assessed    Psychiatric:  not assessed    Heme/Lymph/Imm:  not assessed    Endocrine:  not assessed      Physical Exam:  Vitals: BP 94/70 (BP Location: Right arm, Patient Position: Sitting, Cuff Size: Adult Large)   Pulse 68   Ht 1.905 m (6' 3\")   Wt 109.3 kg (241 lb)   BMI 30.12 kg/m      Constitutional:  cooperative, in no acute distress        Skin:  warm and dry to the touch          Head:  normocephalic        Eyes:  pupils equal and round        Lymph:      ENT:  no pallor or cyanosis        Neck:  no carotid bruit        Respiratory:  clear to auscultation         Cardiac:   irregularly irregular rhythm                                                       GI:  abdomen soft    " "    Extremities and Muscular Skeletal:                 Neurological:  no gross motor deficits        Psych:  Alert and Oriented x 3        Recent Lab Results:  LIPID RESULTS:  Lab Results   Component Value Date    CHOL 207 (H) 04/07/2025    CHOL 313 (H) 04/08/2021    HDL 41 04/07/2025    HDL 47 04/08/2021     (H) 04/07/2025     (H) 04/08/2021    TRIG 102 04/07/2025    TRIG 247 (H) 04/08/2021    CHOLHDLRATIO 3.8 06/06/2014       LIVER ENZYME RESULTS:  Lab Results   Component Value Date    AST 35 03/10/2014    ALT 25 03/10/2014       CBC RESULTS:  Lab Results   Component Value Date    WBC 10.2 04/18/2025    RBC 4.11 (L) 04/18/2025    HGB 12.4 (L) 04/18/2025    HGB 15.1 01/18/2008    HCT 34.9 (L) 04/18/2025    MCV 85 04/18/2025    MCH 30.2 04/18/2025    MCHC 35.5 04/18/2025    RDW 11.7 04/18/2025     (H) 04/18/2025       BMP RESULTS:  Lab Results   Component Value Date     (L) 06/09/2025     04/08/2021    POTASSIUM 3.7 06/09/2025    POTASSIUM 3.8 04/08/2021    CHLORIDE 98 06/09/2025    CHLORIDE 105 04/08/2021    CO2 22 06/09/2025    CO2 24 04/08/2021    ANIONGAP 14 06/09/2025    ANIONGAP 6 04/08/2021     (H) 06/09/2025     (H) 04/19/2025     (H) 05/10/2021    BUN 16.6 06/09/2025    BUN 13 04/08/2021    CR 1.11 06/09/2025    CR 0.89 05/10/2021    GFRESTIMATED 74 06/09/2025    GFRESTIMATED >90 05/10/2021    GFRESTBLACK >90 05/10/2021    VERNON 9.6 06/09/2025    VERNON 8.6 04/08/2021        A1C RESULTS:  Lab Results   Component Value Date    A1C 6.9 (H) 06/09/2025    A1C 11.7 (A) 04/01/2021       INR RESULTS:  No results found for: \"INR\"        CC  Adrian Ellison PA-C  [id number] [place] [address]                  Thank you for allowing me to participate in the care of your patient.      Sincerely,     Kita Fung, [facility]  cc:   Adrian Ellison PA-C  [id number] [place] [address]

## 2025-06-10 NOTE — PROGRESS NOTES
HPI and Plan:   Justino Giordano is a 65 year old male who presents with history of diabetes type 2, hypertension, hyperlipidemia and recent multiple surgeries with I&D and left fifth metatarsal bone excision for gas gangrene of the foot in April.  He was noted to be in atrial fibrillation at that time, he underwent an echocardiogram which showed low normal LV systolic function with an EF estimated at 50 to 55% no significant valve disease.  He was started on metoprolol and Xarelto for CVA prophylaxis after his surgery.  He did undergo a Zio patch monitor about a month later, which showed 77% burden of paroxysmal atrial fibrillation.  He has been working on diet and lifestyle changes as well as medication to lower his blood sugars quite impressively from a hemoglobin A1c in April 2012 0.3 down to 6.9 in June.  He was started on atorvastatin 40 mg in April and has a follow-up visit with PMD in July for repeat cholesterol profile.  He is feeling very well, energy levels have improved.  He denies feeling palpitations, chest pain or shortness of breath.  He does occasionally get lightheaded and has noted his blood pressures have been low.  In May and yesterday blood pressures were in the 80s today in office was 94/70.  A basic metabolic panel drawn yesterday shows improvement in sodium levels but still slightly low at 134.  Creatinine 1.11 and potassium 3.7.  He is maintained on low-dose hydrochlorothiazide since his hospitalization.    On exam cardiovascular tones are irregularly irregular consistent with persistent atrial fibrillation    Summary    1.  Persistent atrial [id number] score is 5 indicating high risk for cerebral embolic events related to atrial fibrillation.  He is on Xarelto and tolerating this without bleeding complication.  He is also on metoprolol for rate control with adequate control.  We discussed rate versus rhythm control today.  Given that he is asymptomatic and continually  improving including energy levels, after discussion we opted to pursue rate control with his current medications.    2.  Hyperlipidemia-on atorvastatin, started in April and has follow-up with PMD in July for repeat cholesterol.  Recommend LDL less than 70 given high risk features    3.  Hypertension-he has been actually hypotensive with the addition of hydrochlorothiazide and also slightly hyponatremic.  Recommend stopping hydrochlorothiazide at this time, he will continue with both metoprolol and lisinopril    Please feel free to contact me with any questions given regards to his care       Today's clinic visit entailed:  Review of external notes as documented elsewhere in note  Review of the result(s) of each unique test - echo, ECG, ziopatch  Prescription drug management    Provider  Link to [facility] Help Grid     The level of medical decision making during this visit was of moderate complexity.    No orders of the defined types were placed in this encounter.    No orders of the defined types were placed in this encounter.    Medications Discontinued During This Encounter   Medication Reason    hydrochlorothiazide 12.5 MG tablet          Encounter Diagnoses   Name Primary?    Type 2 diabetes mellitus with diabetic peripheral angiopathy and gangrene, with long-term current use of insulin (H)     Atrial flutter, paroxysmal (H)        CURRENT MEDICATIONS:  Current Outpatient Medications   Medication Sig Dispense Refill    atorvastatin (LIPITOR) 40 MG tablet Take 1 tablet (40 mg) by mouth daily. 90 tablet 0    blood glucose (NO BRAND SPECIFIED) lancets standard Use to test blood sugar 3 times daily 100 Lancet 0    blood glucose (NO BRAND SPECIFIED) test strip Use to test blood sugar 3 times daily or as directed. 100 strip 0    blood glucose monitoring (NO BRAND SPECIFIED) meter device kit Use to test blood sugar 3 times daily or as directed. 1 kit 0    Continuous Glucose  (DEXCOM G7 ) ADALBERTO Use to read  blood sugars as per 's instructions. 1 each 0    Continuous Glucose Sensor (DEXCOM G7 SENSOR) MISC 1 Device every 10 days. 9 each 1    insulin aspart (NOVOLOG FLEXPEN) 100 UNIT/ML pen Inject 7 Units subcutaneously 3 times daily (with meals). And extra 1 unit every 50 mg/dL. Do not exceed 40 units per day. 30 mL 0    Insulin Glargine-yfgn 100 UNIT/ML SOPN Inject 32 Units subcutaneously every morning. Plus correction and use 2 units for priming. Do not exceed 50 units per day. 60 mL 1    Insulin Pen Needle (PEN NEEDLES) 31G X 5 MM MISC 1 Pen Needle 4 times daily. 100 each 1    lisinopril (ZESTRIL) 20 MG tablet Take 1 tablet (20 mg) by mouth daily. 90 tablet 0    metoprolol succinate ER (TOPROL XL) 25 MG 24 hr tablet Take 1 tablet (25 mg) by mouth daily. 90 tablet 0    rivaroxaban ANTICOAGULANT (XARELTO) 20 MG TABS tablet Take 1 tablet (20 mg) by mouth daily (with dinner). 90 tablet 0       ALLERGIES   No Known Allergies    PAST MEDICAL HISTORY:  Past Medical History:   Diagnosis Date    Diabetes (H)     Elevated blood pressure reading without diagnosis of hypertension     Epigastric hernia     Other and unspecified hyperlipidemia     Type 2 diabetes mellitus without complications (H) 10/21/2015       PAST SURGICAL HISTORY:  Past Surgical History:   Procedure Laterality Date    AMPUTATE TOE(S) Left 4/8/2025    Procedure: Left foot fifth ray resection, left foot excisional debridement down to and including tendon for site measuring 6 cm x 4 cm x 2 cm;  Surgeon: Denisse Preciado DPM;  Location:  OR    AMPUTATE TOE(S) Left 4/17/2025    Procedure: Repeat incision and debridement left foot wound, left fifth metatarsal bone excision;  Surgeon: Bria Pagan DPM, Podiatry/Foot and Ankle Surgery;  Location:  OR    COLONOSCOPY  8/27/2013    Procedure: COLONOSCOPY;  Colonoscopy ;  Surgeon: Ash Brasher MD;  Location:  GI    HC KNEE SCOPE,REMV LOOSE BODY      right knee, left knee scoped     HERNIORRHAPHY EPIGASTRIC  12/12/2012    Procedure: HERNIORRHAPHY EPIGASTRIC;  Epigastric Hernia repair with Mesh ;  Surgeon: Viv Alcantara DO;  Location: [facility] OR    IRRIGATION AND DEBRIDEMENT FOOT, COMBINED Left 4/11/2025    Procedure: Left foot fifth metatarsal resection, left foot excisional debridement down to and including tendon, site measuring 7 cm x 4 cm x 2 cm;  Surgeon: Denisse Preciado DPM;  Location: [facility] OR       FAMILY HISTORY:  Family History   Problem Relation Age of Onset    Heart Disease Father         heart attack age 59    C.A.D. Father     Heart Disease Mother         heart attack age 57    C.A.D. 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 [employer]   Tobacco Use    Smoking status: Never     Passive exposure: Never    Smokeless tobacco: Never   Vaping Use    Vaping status: Never Used   Substance and Sexual Activity    Alcohol use: No    Drug use: No    Sexual activity: Not Currently     Partners: Female   Other Topics Concern    Seat Belt Yes     Social Drivers of Health     Financial Resource Strain: Low Risk  (4/21/2025)    Financial Resource Strain     Within the past 12 months, have you or your family members you live with been unable to get utilities (heat, electricity) when it was really needed?: No   Food Insecurity: No Food Insecurity (6/7/2025)    Received from [facility]    Hunger Vital Sign     Worried About Running Out of Food in the Last Year: Never true     Ran Out of Food in the Last Year: Never true   Transportation Needs: No Transportation Needs (6/7/2025)    Received from [facility]    PRAPARE - Transportation     Lack of Transportation (Medical): No     Lack of Transportation (Non-Medical): No   Physical Activity: Unknown (6/7/2025)  "   Received from [facility]    Exercise Vital Sign     Days of Exercise per Week: 0 days     Minutes of Exercise per Session: Patient declined   Interpersonal Safety: Low Risk  (6/2/2025)    Interpersonal Safety     Do you feel physically and emotionally safe where you currently live?: Yes     Within the past 12 months, have you been hit, slapped, kicked or otherwise physically hurt by someone?: No     Within the past 12 months, have you been humiliated or emotionally abused in other ways by your partner or ex-partner?: No   Housing Stability: Low Risk  (6/7/2025)    Received from [facility]    Housing Stability     What is your living situation today?: I have a steady place to live       Review of Systems:  Skin:  not assessed     Eyes:  not assessed    ENT:  not assessed    Respiratory:  Negative    Cardiovascular:    Positive for, lightheadedness, fatigue  Gastroenterology: not assessed    Genitourinary:  not assessed    Musculoskeletal:  not assessed    Neurologic:  not assessed    Psychiatric:  not assessed    Heme/Lymph/Imm:  not assessed    Endocrine:  not assessed      Physical Exam:  Vitals: BP 94/70 (BP Location: Right arm, Patient Position: Sitting, Cuff Size: Adult Large)   Pulse 68   Ht 1.905 m (6' 3\")   Wt 109.3 kg (241 lb)   BMI 30.12 kg/m      Constitutional:  cooperative, in no acute distress        Skin:  warm and dry to the touch          Head:  normocephalic        Eyes:  pupils equal and round        Lymph:      ENT:  no pallor or cyanosis        Neck:  no carotid bruit        Respiratory:  clear to auscultation         Cardiac:   irregularly irregular rhythm                                                       GI:  abdomen soft        Extremities and Muscular Skeletal:                 Neurological:  no gross motor deficits        Psych:  Alert and Oriented x 3        Recent Lab Results:  LIPID RESULTS:  Lab Results   Component Value Date    CHOL 207 (H) 04/07/2025    CHOL 313 (H) " "04/08/2021    HDL 41 04/07/2025    HDL 47 04/08/2021     (H) 04/07/2025     (H) 04/08/2021    TRIG 102 04/07/2025    TRIG 247 (H) 04/08/2021    CHOLHDLRATIO 3.8 06/06/2014       LIVER ENZYME RESULTS:  Lab Results   Component Value Date    AST 35 03/10/2014    ALT 25 03/10/2014       CBC RESULTS:  Lab Results   Component Value Date    WBC 10.2 04/18/2025    RBC 4.11 (L) 04/18/2025    HGB 12.4 (L) 04/18/2025    HGB 15.1 01/18/2008    HCT 34.9 (L) 04/18/2025    MCV 85 04/18/2025    MCH 30.2 04/18/2025    MCHC 35.5 04/18/2025    RDW 11.7 04/18/2025     (H) 04/18/2025       BMP RESULTS:  Lab Results   Component Value Date     (L) 06/09/2025     04/08/2021    POTASSIUM 3.7 06/09/2025    POTASSIUM 3.8 04/08/2021    CHLORIDE 98 06/09/2025    CHLORIDE 105 04/08/2021    CO2 22 06/09/2025    CO2 24 04/08/2021    ANIONGAP 14 06/09/2025    ANIONGAP 6 04/08/2021     (H) 06/09/2025     (H) 04/19/2025     (H) 05/10/2021    BUN 16.6 06/09/2025    BUN 13 04/08/2021    CR 1.11 06/09/2025    CR 0.89 05/10/2021    GFRESTIMATED 74 06/09/2025    GFRESTIMATED >90 05/10/2021    GFRESTBLACK >90 05/10/2021    VERNON 9.6 06/09/2025    VERNON 8.6 04/08/2021        A1C RESULTS:  Lab Results   Component Value Date    A1C 6.9 (H) 06/09/2025    A1C 11.7 (A) 04/01/2021       INR RESULTS:  No results found for: \"INR\"        CC  Adrian Ellison PA-C  [id number] [id number]                "

## 2025-06-16 ENCOUNTER — TELEPHONE (OUTPATIENT)
Dept: FAMILY MEDICINE | Facility: CLINIC | Age: 65
End: 2025-06-16
Payer: MEDICARE

## 2025-06-16 NOTE — TELEPHONE ENCOUNTER
Missed visits in provider basket for signature.      Claudia GRIER, - [facility]  [facility] [facility]

## 2025-06-16 NOTE — TELEPHONE ENCOUNTER
Forms/Letter Request    Type of form/letter: OTHER:    [facility]  Summary of Missed Visits    Do we have the form/letter: Yes:     Who is the form from? Home care    Where did/will the form come from? form was faxed in    When is form/letter needed by:     How would you like the form/letter returned: Fax : [phone]    Jennifer Gimenez Patient Representative

## 2025-06-17 NOTE — TELEPHONE ENCOUNTER
Provider signed form.  - faxed to abstracting and [facility]  - placed in completed faxes file @ [facility].      Claudia GRIER, - [facility] 2  Primary Care- [place] [facility]

## 2025-06-23 ENCOUNTER — TELEPHONE (OUTPATIENT)
Dept: FAMILY MEDICINE | Facility: CLINIC | Age: 65
End: 2025-06-23
Payer: MEDICARE

## 2025-06-23 DIAGNOSIS — Z79.4 TYPE 2 DIABETES MELLITUS WITH DIABETIC PERIPHERAL ANGIOPATHY AND GANGRENE, WITH LONG-TERM CURRENT USE OF INSULIN (H): Primary | ICD-10-CM

## 2025-06-23 DIAGNOSIS — E11.52 TYPE 2 DIABETES MELLITUS WITH DIABETIC PERIPHERAL ANGIOPATHY AND GANGRENE, WITH LONG-TERM CURRENT USE OF INSULIN (H): Primary | ICD-10-CM

## 2025-06-23 NOTE — TELEPHONE ENCOUNTER
Alcohol prep pads are not on current med list. But wondering if we could get a script for them.     Was last prescribed by the hospitalist.    Chantal Ambrosio, [facility]  [address] [place] [address] [phone];w

## 2025-06-24 RX ORDER — BLOOD PRESSURE TEST KIT
1 KIT MISCELLANEOUS DAILY PRN
Qty: 100 EACH | Refills: 3 | Status: SHIPPED | OUTPATIENT
Start: 2025-06-24

## 2025-06-30 ENCOUNTER — TELEPHONE (OUTPATIENT)
Dept: FAMILY MEDICINE | Facility: CLINIC | Age: 65
End: 2025-06-30
Payer: MEDICARE

## 2025-06-30 NOTE — TELEPHONE ENCOUNTER
Form placed in Provider basket for review and signature.    [facility]   C and POC  Tracking [id number]   Fax : [phone]     Cate Sampson

## 2025-06-30 NOTE — TELEPHONE ENCOUNTER
Forms/Letter Request    Type of form/letter: OTHER:    [facility]   HHC and POC  Tracking [id number]    Do we have the form/letter: Yes:     Who is the form from? Home care    Where did/will the form come from? form was faxed in    When is form/letter needed by:     How would you like the form/letter returned: Fax : [phone]    Jennifer Gimenez Patient Representative

## 2025-07-07 DIAGNOSIS — Z53.9 DIAGNOSIS NOT YET DEFINED: Primary | ICD-10-CM

## 2025-07-07 PROCEDURE — G0179 MD RECERTIFICATION HHA PT: HCPCS | Performed by: PHYSICIAN ASSISTANT

## 2025-07-07 NOTE — TELEPHONE ENCOUNTER
Form signed by Provider and faxed to:    [facility]   C and POC  Tracking [id number]   Fax : [phone]      Cate Sampson

## 2025-07-09 ENCOUNTER — VIRTUAL VISIT (OUTPATIENT)
Dept: EDUCATION SERVICES | Facility: CLINIC | Age: 65
End: 2025-07-09
Payer: MEDICARE

## 2025-07-09 DIAGNOSIS — Z79.4 TYPE 2 DIABETES MELLITUS WITH DIABETIC PERIPHERAL ANGIOPATHY AND GANGRENE, WITH LONG-TERM CURRENT USE OF INSULIN (H): Primary | ICD-10-CM

## 2025-07-09 DIAGNOSIS — E11.52 TYPE 2 DIABETES MELLITUS WITH DIABETIC PERIPHERAL ANGIOPATHY AND GANGRENE, WITH LONG-TERM CURRENT USE OF INSULIN (H): Primary | ICD-10-CM

## 2025-07-09 PROCEDURE — G0108 DIAB MANAGE TRN  PER INDIV: HCPCS | Mod: 93 | Performed by: REGISTERED NURSE

## 2025-07-09 NOTE — PROGRESS NOTES
Diabetes Self-Management Education & Support  Type of Service: Telephone Visit    Originating Location (Patient Location): Home  Distant Location (Provider Location): Offsite  Mode of Communication:  Telephone    Telephone Visit Start Time: 9:30 AM  Telephone Visit End Time (telephone visit stop time): 10:32 AM    How would patient like to obtain AVS? Mail a copy      Assessment  2-month follow-up for diabetes education counseling.  Last A1c was at goal. Miguel is using the Dexcom G7 CGM and a .  He does not have a computer at home to download the data.  Patient was unable to do time in range details since we were on a telephone visit.  Recall his fasting blood sugar was morning was 120.  No episodes of hypoglycemia.  He starts feeling low with blood sugars of 100.  Patient is very disciplined about his diet.  He continues to lose weight. Patient reported his highest weight was 334 pounds in [id number].  Praised him on his efforts.  Completed diabetes education topics regarding preventing diabetes complications and preventative health care.    Patient's most recent   Lab Results   Component Value Date    A1C 6.9 06/09/2025    A1C 11.7 04/01/2021     is meeting goal of <7.0    Diabetes knowledge and skills assessment:   Patient is knowledgeable in diabetes management concepts related to: Healthy Eating, Being Active, Monitoring, Taking Medication, Problem Solving, Reducing Risks, and Healthy Coping    Based on learning assessment above, most appropriate setting for further diabetes education would be: Individual setting.    Care Plan and Education Provided:  Healthy Eating: Balanced meals, Portion control, and Weight Management  Being Active: Finding a physical activity routine that works for you  Taking Medication: Proper site selection and rotation for injections, Side effects of prescribed medication(s), and When to take medication(s)  Problem Solving: High glucose - causes, signs/symptoms, treatment and  prevention, Low glucose - causes, signs/symptoms, treatment and prevention, and Rule of 15 and carrying a carbohydrate source at all times in case of low glucose  Reducing Risks: Complications of diabetes, Dental care, Eye care, Foot care, Goal for A1c, how it relates to glucose and how often to check, and A1C - goals, relating to blood glucose levels, how often to check    Patient verbalized understanding of diabetes self-management education concepts discussed, opportunities for ongoing education and support, and recommendations provided today.    Plan  Continue to use the Dexcom G7 to monitor blood sugars.  Continue Glargine (Lantus) 32 units in the morning.  If you get low blood sugars overnight (less than 70) decrease Glargine (Lantus) to 30 units in the morning.  Continue Aspart (NovoLog) according to the sliding scale as previous.  Treat low blood sugar (less 70) with 1/2 cup juice or 3 glucose tablets or 1 oz raisins. Wait 15 minutes and view blood sugar.   Always carry a form of sugar while you are outside of the house.  Schedule a routine eye exam and dental visit when able.  Continue to drink a lot of water to stay hydrated, protect your kidneys, keep you full and flush out the extra sugar.  Follow-up as needed.    Topics to cover at upcoming visits: None.  Education complete.    See Care Plan for co-developed, patient-state behavior change goals.    Education Materials Provided on AVS:  Goals for diabetes care/preventing diabetes complications  Hypoglycemia symptoms and treatment    Subjective/Objective  Justino is an 65 year old, presenting for the following diabetes education related to: Follow-up  Accompanied by: Self  Diabetes education in the past 24mo: Yes  Focus of Visit: Reducing Risks, Problem Solving, CGM  Type of CGM visit: Personal CGM Follow-up (Dexcom G7 using a . No computer to download data at home.)  Diabetes type: Type 2  Date of diagnosis: 2013  Disease course:  "Improving  Diabetes management related comments/concerns: one  Difficulty affording diabetes medication?: No  Difficulty affording diabetes testing supplies?: No  Other concerns: None  Cultural Influences/Ethnic Background:  Not  or     Diabetes Symptoms & Complications:  Diabetes Related Symptoms: None  Weight trend: Decreasing  Symptom course: Improving  Disease course: Improving  Complications assessed today?: No    Patient Problem List and Family Medical History reviewed for relevant medical history, current medical status, and diabetes risk factors.    Vitals:  There were no vitals taken for this visit.  Estimated body mass index is 30.12 kg/m  as calculated from the following:    Height as of 6/10/25: 1.905 m (6' 3\").    Weight as of 6/10/25: 109.3 kg (241 lb).     Wt Readings from Last 3 Encounters:   06/10/25 109.3 kg (241 lb)   06/09/25 109.4 kg (241 lb 3.2 oz)   05/12/25 110.1 kg (242 lb 11.2 oz)      Last 3 BP:   BP Readings from Last 3 Encounters:   06/10/25 94/70   06/09/25 (!) 81/50   06/02/25 111/62       History   Smoking Status    Never   Smokeless Tobacco    Never       Labs:  Lab Results   Component Value Date    A1C 6.9 06/09/2025    A1C 11.7 04/01/2021     Lab Results   Component Value Date     06/09/2025     04/19/2025     05/10/2021     Lab Results   Component Value Date     04/07/2025     04/08/2021     HDL Cholesterol   Date Value Ref Range Status   04/08/2021 47 >39 mg/dL Final     Direct Measure HDL   Date Value Ref Range Status   04/07/2025 41 >=40 mg/dL Final     GFR Estimate   Date Value Ref Range Status   06/09/2025 74 >60 mL/min/1.73m2 Final     Comment:     eGFR calculated using 2021 CKD-EPI equation.   05/10/2021 >90 >60 mL/min/[1.73_m2] Final     Comment:     Non  GFR Calc  Starting 12/18/2018, serum creatinine based estimated GFR (eGFR) will be   calculated using the Chronic Kidney Disease Epidemiology Collaboration "   (CKD-EPI) equation.       GFR Estimate If Black   Date Value Ref Range Status   05/10/2021 >90 >60 mL/min/[1.73_m2] Final     Comment:      GFR Calc  Starting 12/18/2018, serum creatinine based estimated GFR (eGFR) will be   calculated using the Chronic Kidney Disease Epidemiology Collaboration   (CKD-EPI) equation.       Lab Results   Component Value Date    CR 1.11 06/09/2025    CR 0.89 05/10/2021     Lab Results   Component Value Date    MICROL 85 04/08/2021    UMALCR 28.30 (H) 04/08/2021    UCRR 300 04/08/2021 7/9/2025   Healthy Eating   Healthy Eating Assessed Today Yes   Meal planning/habits Low carb;Avoiding sweets;Smaller portions;Heart healthy;Low salt   Who cooks/prepares meals for you? Other;Sister   Who purchases food in  your home? Sister;Other       neighbors cook and bake and share meals   Meals include Breakfast;Lunch;Dinner;Afternoon Snack;Evening Snack   Breakfast 8:00 am 1 small bowl of cereal (corn flakes or oatmeal or rice krispies or cheerios) and fruit or scrambled eggs with wheat toast, apple or cheeries or protein drink. low carb juice.   Lunch 1:00 pm 1/2-whole sandwich with turkey or roast beef, cheese or walleye, sugar-free jello   Dinner 6-7:00 pm fish or chicken, vegetables, rice or corn-on-cob, or couscous or potatoes, carrots or small amount of pasta (raises blood sugar).   Snacks limited. Sometimes 2 handful of popcorn, fruit or carrot sticks and dip or 6 tortilla chips with salsa and guacamole or protein bar (2 g sugar)   Other Drinks a lot of water   Beverages Water;Juice;Milk;Other (see Comments)   Please elaborate: Gatorade Zero, Nathalie low carb juice-1 g carbohydrate. Mostly drinks water.   Has patient met with a dietitian in the past? Yes         7/9/2025   Being Active   Being Active Assessed Today Yes       uses a walker.   Exercise: Yes       some walking in hallway or visiting people in the building   How intense was your typical exercise?   Light (like stretching or slow walking)   Barrier to exercise Physical limitation       toe ulcer-MD advised to limit activity. Wearing a boot         7/9/2025   Monitoring   Monitoring Assessed Today Yes   Blood Glucose Meter CGM   Blood glucose trend Decreasing     Diabetes Medication(s)       Insulin       insulin aspart (NOVOLOG FLEXPEN) 100 UNIT/ML pen Inject 7 Units subcutaneously 3 times daily (with meals). And extra 1 unit every 50 mg/dL. Do not exceed 40 units per day.     Insulin Glargine-yfgn 100 UNIT/ML SOPN Inject 32 Units subcutaneously every morning. Plus correction and use 2 units for priming. Do not exceed 50 units per day.              7/9/2025   Taking Medications   Taking Medication Assessed Today Yes   Current Treatments Diet;Oral Medication (taken by mouth);Insulin Injections   Dose schedule Pre-breakfast;Pre-lunch;Pre-dinner   Given by Patient   Injection/Infusion sites Abdomen   Problems taking diabetes medications regularly? No   Diabetes medication side effects? No         7/9/2025   Problem Solving   Problem Solving Assessed Today Yes   Is the patient at risk for hypoglycemia? Yes   Hypoglycemia Frequency Rarely   Hypoglycemia Treatment Juice   Is the patient at risk for DKA? No   Hypoglycemia Sleepiness       symptoms of low blood sugar at            7/9/2025   Reducing Risks   Reducing Risks Assessed Today Yes   Diabetes Risks Age over 45 years;Family History;Sedentary Lifestyle;Hyperlipidemia   CAD Risks Diabetes Mellitus;Dyslipidemia;Hypertension;Male sex;Sedentary lifestyle;Obesity   Has dilated eye exam at least once a year? No   Sees dentist every 6 months? No   Feet checked by healthcare provider in the last year? Yes         7/9/2025   Healthy Coping: Diabetes Distress Assessment   Healthy Coping Assessed Today No   Informal Support system: Other;Neighbors       sister, neighbors in the condo complex         Annemarie Lanier RD, LD, CDCES   Certified Diabetes Care and Education  Specialist  [facility] Tuesdays and Thursdays  [facility] Wednesdays-virtual visits only    Time Spent: 60 minutes  Encounter Type: Individual    Any diabetes medication dose changes were made via the [facility] Standing Orders under the patient's referring provider.    This note has been dictated using voice recognition software. Any grammatical or context distortions are unintentional and inherent to the software.    6

## 2025-07-09 NOTE — LETTER
7/9/2025         RE: Justino Giordano  [address]  [facility] [address]        Dear Colleague,    Thank you for referring your patient, Justino Giordano, to the [facility]. Please see a copy of my visit note below.    Diabetes Self-Management Education & Support  Type of Service: Telephone Visit    Originating Location (Patient Location): Home  Distant Location (Provider Location): Offsite  Mode of Communication:  Telephone    Telephone Visit Start Time: 9:30 AM  Telephone Visit End Time (telephone visit stop time): 10:32 AM    How would patient like to obtain AVS? Mail a copy      Assessment  2-month follow-up for diabetes education counseling.  Last A1c was at goal. Miguel is using the Dexcom G7 CGM and a .  He does not have a computer at home to download the data.  Patient was unable to do time in range details since we were on a telephone visit.  Recall his fasting blood sugar was morning was 120.  No episodes of hypoglycemia.  He starts feeling low with blood sugars of 100.  Patient is very disciplined about his diet.  He continues to lose weight. Patient reported his highest weight was 334 pounds in [id number].  Praised him on his efforts.  Completed diabetes education topics regarding preventing diabetes complications and preventative health care.    Patient's most recent   Lab Results   Component Value Date    A1C 6.9 06/09/2025    A1C 11.7 04/01/2021     is meeting goal of <7.0    Diabetes knowledge and skills assessment:   Patient is knowledgeable in diabetes management concepts related to: Healthy Eating, Being Active, Monitoring, Taking Medication, Problem Solving, Reducing Risks, and Healthy Coping    Based on learning assessment above, most appropriate setting for further diabetes education would be: Individual setting.    Care Plan and Education Provided:  Healthy Eating: Balanced meals, Portion control, and Weight Management  Being Active: Finding a physical  activity routine that works for you  Taking Medication: Proper site selection and rotation for injections, Side effects of prescribed medication(s), and When to take medication(s)  Problem Solving: High glucose - causes, signs/symptoms, treatment and prevention, Low glucose - causes, signs/symptoms, treatment and prevention, and Rule of 15 and carrying a carbohydrate source at all times in case of low glucose  Reducing Risks: Complications of diabetes, Dental care, Eye care, Foot care, Goal for A1c, how it relates to glucose and how often to check, and A1C - goals, relating to blood glucose levels, how often to check    Patient verbalized understanding of diabetes self-management education concepts discussed, opportunities for ongoing education and support, and recommendations provided today.    Plan  Continue to use the Dexcom G7 to monitor blood sugars.  Continue Glargine (Lantus) 32 units in the morning.  If you get low blood sugars overnight (less than 70) decrease Glargine (Lantus) to 30 units in the morning.  Continue Aspart (NovoLog) according to the sliding scale as previous.  Treat low blood sugar (less 70) with 1/2 cup juice or 3 glucose tablets or 1 oz raisins. Wait 15 minutes and view blood sugar.   Always carry a form of sugar while you are outside of the house.  Schedule a routine eye exam and dental visit when able.  Continue to drink a lot of water to stay hydrated, protect your kidneys, keep you full and flush out the extra sugar.  Follow-up as needed.    Topics to cover at upcoming visits: None.  Education complete.    See Care Plan for co-developed, patient-state behavior change goals.    Education Materials Provided on AVS:  Goals for diabetes care/preventing diabetes complications  Hypoglycemia symptoms and treatment    Subjective/Objective  uJstino is an 65 year old, presenting for the following diabetes education related to: Follow-up  Accompanied by: Self  Diabetes education in the past 24mo:  "Yes  Focus of Visit: Reducing Risks, Problem Solving, CGM  Type of CGM visit: Personal CGM Follow-up (Dexcom G7 using a . No computer to download data at home.)  Diabetes type: Type 2  Date of diagnosis: 2013  Disease course: Improving  Diabetes management related comments/concerns: one  Difficulty affording diabetes medication?: No  Difficulty affording diabetes testing supplies?: No  Other concerns: None  Cultural Influences/Ethnic Background:  Not  or     Diabetes Symptoms & Complications:  Diabetes Related Symptoms: None  Weight trend: Decreasing  Symptom course: Improving  Disease course: Improving  Complications assessed today?: No    Patient Problem List and Family Medical History reviewed for relevant medical history, current medical status, and diabetes risk factors.    Vitals:  There were no vitals taken for this visit.  Estimated body mass index is 30.12 kg/m  as calculated from the following:    Height as of 6/10/25: 1.905 m (6' 3\").    Weight as of 6/10/25: 109.3 kg (241 lb).     Wt Readings from Last 3 Encounters:   06/10/25 109.3 kg (241 lb)   06/09/25 109.4 kg (241 lb 3.2 oz)   05/12/25 110.1 kg (242 lb 11.2 oz)      Last 3 BP:   BP Readings from Last 3 Encounters:   06/10/25 94/70   06/09/25 (!) 81/50   06/02/25 111/62       History   Smoking Status     Never   Smokeless Tobacco     Never       Labs:  Lab Results   Component Value Date    A1C 6.9 06/09/2025    A1C 11.7 04/01/2021     Lab Results   Component Value Date     06/09/2025     04/19/2025     05/10/2021     Lab Results   Component Value Date     04/07/2025     04/08/2021     HDL Cholesterol   Date Value Ref Range Status   04/08/2021 47 >39 mg/dL Final     Direct Measure HDL   Date Value Ref Range Status   04/07/2025 41 >=40 mg/dL Final     GFR Estimate   Date Value Ref Range Status   06/09/2025 74 >60 mL/min/1.73m2 Final     Comment:     eGFR calculated using 2021 CKD-EPI equation. "   05/10/2021 >90 >60 mL/min/[1.73_m2] Final     Comment:     Non  GFR Calc  Starting 12/18/2018, serum creatinine based estimated GFR (eGFR) will be   calculated using the Chronic Kidney Disease Epidemiology Collaboration   (CKD-EPI) equation.       GFR Estimate If Black   Date Value Ref Range Status   05/10/2021 >90 >60 mL/min/[1.73_m2] Final     Comment:      GFR Calc  Starting 12/18/2018, serum creatinine based estimated GFR (eGFR) will be   calculated using the Chronic Kidney Disease Epidemiology Collaboration   (CKD-EPI) equation.       Lab Results   Component Value Date    CR 1.11 06/09/2025    CR 0.89 05/10/2021     Lab Results   Component Value Date    MICROL 85 04/08/2021    UMALCR 28.30 (H) 04/08/2021    UCRR 300 04/08/2021 7/9/2025   Healthy Eating   Healthy Eating Assessed Today Yes   Meal planning/habits Low carb;Avoiding sweets;Smaller portions;Heart healthy;Low salt   Who cooks/prepares meals for you? Other;Sister   Who purchases food in  your home? Sister;Other       neighbors cook and bake and share meals   Meals include Breakfast;Lunch;Dinner;Afternoon Snack;Evening Snack   Breakfast 8:00 am 1 small bowl of cereal (corn flakes or oatmeal or rice krispies or cheerios) and fruit or scrambled eggs with wheat toast, apple or cheeries or protein drink. low carb juice.   Lunch 1:00 pm 1/2-whole sandwich with turkey or roast beef, cheese or walleye, sugar-free jello   Dinner 6-7:00 pm fish or chicken, vegetables, rice or corn-on-cob, or couscous or potatoes, carrots or small amount of pasta (raises blood sugar).   Snacks limited. Sometimes 2 handful of popcorn, fruit or carrot sticks and dip or 6 tortilla chips with salsa and guacamole or protein bar (2 g sugar)   Other Drinks a lot of water   Beverages Water;Juice;Milk;Other (see Comments)   Please elaborate: Gatorade Zero, Terry low carb juice-1 g carbohydrate. Mostly drinks water.   Has patient met with a  dietitian in the past? Yes         7/9/2025   Being Active   Being Active Assessed Today Yes       uses a walker.   Exercise: Yes       some walking in hallway or visiting people in the building   How intense was your typical exercise?  Light (like stretching or slow walking)   Barrier to exercise Physical limitation       toe ulcer-MD advised to limit activity. Wearing a boot         7/9/2025   Monitoring   Monitoring Assessed Today Yes   Blood Glucose Meter CGM   Blood glucose trend Decreasing     Diabetes Medication(s)       Insulin       insulin aspart (NOVOLOG FLEXPEN) 100 UNIT/ML pen Inject 7 Units subcutaneously 3 times daily (with meals). And extra 1 unit every 50 mg/dL. Do not exceed 40 units per day.     Insulin Glargine-yfgn 100 UNIT/ML SOPN Inject 32 Units subcutaneously every morning. Plus correction and use 2 units for priming. Do not exceed 50 units per day.              7/9/2025   Taking Medications   Taking Medication Assessed Today Yes   Current Treatments Diet;Oral Medication (taken by mouth);Insulin Injections   Dose schedule Pre-breakfast;Pre-lunch;Pre-dinner   Given by Patient   Injection/Infusion sites Abdomen   Problems taking diabetes medications regularly? No   Diabetes medication side effects? No         7/9/2025   Problem Solving   Problem Solving Assessed Today Yes   Is the patient at risk for hypoglycemia? Yes   Hypoglycemia Frequency Rarely   Hypoglycemia Treatment Juice   Is the patient at risk for DKA? No   Hypoglycemia Sleepiness       symptoms of low blood sugar at            7/9/2025   Reducing Risks   Reducing Risks Assessed Today Yes   Diabetes Risks Age over 45 years;Family History;Sedentary Lifestyle;Hyperlipidemia   CAD Risks Diabetes Mellitus;Dyslipidemia;Hypertension;Male sex;Sedentary lifestyle;Obesity   Has dilated eye exam at least once a year? No   Sees dentist every 6 months? No   Feet checked by healthcare provider in the last year? Yes         7/9/2025    Healthy Coping: Diabetes Distress Assessment   Healthy Coping Assessed Today No   Informal Support system: Other;Neighbors       sister, neighbors in the condo complex         Annemarie Lanier RD, LD, [facility]   Certified Diabetes Care and   [facility] Tuesdays and Thursdays  [facility] Wednesdays-virtual visits only    Time Spent: 60 minutes  Encounter Type: Individual    Any diabetes medication dose changes were made via the [facility] Standing Orders under the patient's referring provider.    This note has been dictated using voice recognition software. Any grammatical or context distortions are unintentional and inherent to the software.

## 2025-07-09 NOTE — PATIENT INSTRUCTIONS
Continue to use the Dexcom G7 to monitor blood sugars.  Continue Glargine (Lantus) 32 units in the morning.  If you get low blood sugars overnight (less than 70) decrease Glargine (Lantus) to 30 units in the morning.  Continue Aspart (NovoLog) according to the sliding scale as previous.  Treat low blood sugar (less 70) with 1/2 cup juice or 3 glucose tablets or 1 oz raisins. Wait 15 minutes and view blood sugar.   Always carry a form of sugar while you are outside of the house.  Schedule a routine eye exam and dental visit when able.  Continue to drink a lot of water to stay hydrated, protect your kidneys, keep you full and flush out the extra sugar.  Follow-up as needed.    Blood sugar goals:  Before meals   2 hours after meals: less 180  Bedtime:     A1c: less than 7.0% (estimated average blood sugar of 154 mg/dl)    Keep up the excellent work!    [facility] for the [facility]:  For Your Diabetes Education and Nutrition Appointments Call:  [phone]   For Diabetes Education or Nutrition Related Questions:   Send Only [place] Message   If you need a medication refill please contact your pharmacy. Please allow 3 business days for your refills to be completed.  I truly appreciate your feedback on our appointment together. You will either receive an email, text message, and/or phone call survey about today's appointment. Please complete this survey as soon as you are able so I can continue to improve upon my practice. Thank you!

## 2025-07-30 ENCOUNTER — LAB (OUTPATIENT)
Dept: LAB | Facility: CLINIC | Age: 65
End: 2025-07-30
Payer: MEDICARE

## 2025-07-30 ENCOUNTER — OFFICE VISIT (OUTPATIENT)
Dept: PHARMACY | Facility: CLINIC | Age: 65
End: 2025-07-30
Payer: MEDICARE

## 2025-07-30 VITALS
WEIGHT: 232 LBS | HEART RATE: 75 BPM | BODY MASS INDEX: 29 KG/M2 | SYSTOLIC BLOOD PRESSURE: 95 MMHG | DIASTOLIC BLOOD PRESSURE: 65 MMHG | OXYGEN SATURATION: 99 %

## 2025-07-30 DIAGNOSIS — Z79.4 TYPE 2 DIABETES MELLITUS WITH DIABETIC PERIPHERAL ANGIOPATHY AND GANGRENE, WITH LONG-TERM CURRENT USE OF INSULIN (H): ICD-10-CM

## 2025-07-30 DIAGNOSIS — I10 ESSENTIAL HYPERTENSION, BENIGN: Primary | ICD-10-CM

## 2025-07-30 DIAGNOSIS — I48.92 ATRIAL FLUTTER, UNSPECIFIED TYPE (H): ICD-10-CM

## 2025-07-30 DIAGNOSIS — E78.5 HYPERLIPIDEMIA LDL GOAL <100: ICD-10-CM

## 2025-07-30 DIAGNOSIS — E11.52 TYPE 2 DIABETES MELLITUS WITH DIABETIC PERIPHERAL ANGIOPATHY AND GANGRENE, WITH LONG-TERM CURRENT USE OF INSULIN (H): ICD-10-CM

## 2025-07-30 LAB
CHOLEST SERPL-MCNC: 159 MG/DL
FASTING STATUS PATIENT QL REPORTED: YES
HDLC SERPL-MCNC: 43 MG/DL
LDLC SERPL CALC-MCNC: 89 MG/DL
NONHDLC SERPL-MCNC: 116 MG/DL
TRIGL SERPL-MCNC: 136 MG/DL

## 2025-07-30 PROCEDURE — 36415 COLL VENOUS BLD VENIPUNCTURE: CPT

## 2025-07-30 PROCEDURE — 80061 LIPID PANEL: CPT

## 2025-07-30 RX ORDER — PEN NEEDLE, DIABETIC 31 GX3/16"
NEEDLE, DISPOSABLE MISCELLANEOUS
Refills: 1 | OUTPATIENT
Start: 2025-07-30

## 2025-07-30 RX ORDER — LISINOPRIL 10 MG/1
10 TABLET ORAL DAILY
Qty: 90 TABLET | Refills: 1 | Status: SHIPPED | OUTPATIENT
Start: 2025-07-30

## 2025-07-30 NOTE — PROGRESS NOTES
Medication Therapy Management (MTM) Encounter    ASSESSMENT:                            Medication Adherence/Access: No issues identified.    Diabetes  Patient is meeting A1c goal of < 7%.  Patient is meeting fasting goal of  per patient report.  Patient would benefit from no changes today.  Continue on basal and mealtime insulin without change today since blood sugars fasting and postprandial are at goal with no hypoglycemia. Would recommend A1C goal < 7% and aggressive control to assist with wound healing. Insulin is the best option for him right now to get under tight control but could consider other pharmacotherapy options other than insulin in the future.     Hypertension and atrial flutter  Blood pressure at goal and continues to be on the low end with some symptoms. Discussed with primary care provider and she agrees with lowering lisinopril dose today. I asked him to reach out if he starts to have symptoms of hypotension or concerned about home readings.     Hyperlipidemia   Repeat fasting lipid panel today. Goal to lower LDL < 70 and consider 50% reduction due to ASCVD risk > 20% (baseline  4/7/2025).    PLAN:                            You can ask your surgeon about rechecking hemoglobin with healing wound.  Schedule a diabetes eye exam. If you want to schedule within [facility] call [phone].   Decrease lisinopril to 10 mg once daily. I will send a new prescription for the 10 mg tablets. You can cut your 20 mg in half and take half daily.    Follow-up: Return in about 4 weeks (around 8/27/2025).    SUBJECTIVE/OBJECTIVE:                          Justino Giordano is a 65 year old male seen for a follow-up visit from 6/9/25.       Reason for visit: follow-up diabetes and blood pressure.    Allergies/ADRs: Reviewed in chart  Past Medical History: Reviewed in chart  Tobacco: He reports that he has never smoked. He has never been exposed to tobacco smoke. He has never used smokeless  tobacco.  Alcohol: not currently using      Medication Adherence/Access: no issues reported.    Diabetes   Lantus 32 units daily morning  Novolog 7 units plus the following correction scale - using about 20-21 units daily    <70 mg/dL:           take no insulin injections  70-99 mg/dL:        minus 2 units   100-150 mg/dL:    no adjustment = 7 units  150-199 mg/dL:    add 1 units (= 8 units)  200-249 mg/dL:    add 2 units  (= 9 units)  250-299 mg/dL:    add 3 units  (= 10 units)  >300 mg/dL:         add 4 units and call your doctor  (= 11 units)    He reports no low blood sugars.     Diet/Exercise:   He had visit with diabetes education and has follow-up in July  No change since last visit - copied forward. Use to only eat 3 meals a day. Doesn't snack between meals.  Trying to eat low carb meals and small portions. He eats sugar free treats if he does have them. He eats lots of vegetables. Activity is limited due to his foot wound healing. He is not supposed to be on his foot for long.    Blood sugar monitoring: Continuous Glucose Monitor see AGP below    He forgot his dexom reader - reports fasting readings are 110. He has not had any sugars less than 70. He may go into the 90's overnight.     From last visit         Eye exam in the last 12 months? No  Foot exam: due    Hypertension   and atrial flutter  Lisinopril 20 mg daily   Metoprolol ER 25 mg daily   Xarelto 20 mg in the evening    Medication History: Hydrochlorothiazide stopped due to low blood pressure.    Patient reports he continues to feel little light headed/foggy and tired.  He stopped his blood pressure medicines a couple days and felt much better. Reports home blood pressure readings 110'/60-70's.  Denies any symptoms palpitation/flutter. Reports no shortness of breath.    [id number] Score = 3               Hyperlipidemia   Atorvastatin 40 mg daily - started April 2025    Patient reports no significant myalgias or other side effects. He does report  he did have one cracker this morning so not completely fasting.       Today's Vitals: BP 95/65   Pulse 75   Wt 232 lb (105.2 kg)   SpO2 99%   BMI 29.00 kg/m    ----------------      I spent 15 minutes with this patient today. All changes were made via collaborative practice agreement with Adrian Ellison PA-C.     A summary of these recommendations was given to the patient.    Promise Doty, PharmD  Medication Therapy Management Pharmacist     Medication Therapy Recommendations  Essential hypertension, benign   1 Current Medication: lisinopril (ZESTRIL) 20 MG tablet (Discontinued)   Current Medication Sig: Take 1 tablet (20 mg) by mouth daily.   Rationale: Dose too high - Dosage too high - Safety   Recommendation: Decrease Dose   Status: Accepted per Provider   Identified Date: 7/30/2025 Completed Date: 7/30/2025

## 2025-07-30 NOTE — PATIENT INSTRUCTIONS
"Recommendations from today's MTM visit:                                                       You can ask your surgeon about rechecking hemoglobin with healing wound.  Schedule a diabetes eye exam. If you want to schedule within [facility] call [phone].   Decrease lisinopril to 10 mg once daily. I will send a new prescription for the 10 mg tablets. You can cut your 20 mg in half and take half daily.    Follow-up: Return in about 4 weeks (around 8/27/2025).    It was great speaking with you today.  I value your experience and would be very thankful for your time in providing feedback in our clinic survey. In the next few days, you may receive an email or text message from [employer] with a link to a survey related to your  clinical pharmacist.\"     To schedule another MTM appointment, please call the clinic directly or you may call the MTM scheduling line at [phone] or toll-free at [phone].     My Clinical Pharmacist's contact information:                                                      Please feel free to contact me with any questions or concerns you have.      Promise Doty, PharmD  Medication Therapy Management Pharmacist  [facility] - Monday and Wednesday 7:30 - 4:00    "

## 2025-08-05 DIAGNOSIS — Z79.4 TYPE 2 DIABETES MELLITUS WITHOUT COMPLICATION, WITH LONG-TERM CURRENT USE OF INSULIN (H): ICD-10-CM

## 2025-08-05 DIAGNOSIS — E11.9 TYPE 2 DIABETES MELLITUS WITHOUT COMPLICATION, WITH LONG-TERM CURRENT USE OF INSULIN (H): ICD-10-CM

## 2025-08-05 RX ORDER — INSULIN ASPART 100 [IU]/ML
INJECTION, SOLUTION INTRAVENOUS; SUBCUTANEOUS
Qty: 30 ML | Refills: 0 | Status: SHIPPED | OUTPATIENT
Start: 2025-08-05

## 2025-08-11 ENCOUNTER — OFFICE VISIT (OUTPATIENT)
Dept: WOUND CARE | Facility: CLINIC | Age: 65
End: 2025-08-11
Attending: PODIATRIST
Payer: MEDICARE

## 2025-08-11 VITALS — SYSTOLIC BLOOD PRESSURE: 124 MMHG | DIASTOLIC BLOOD PRESSURE: 72 MMHG | TEMPERATURE: 97.8 F | HEART RATE: 72 BPM

## 2025-08-11 DIAGNOSIS — E11.621: Primary | ICD-10-CM

## 2025-08-11 DIAGNOSIS — L97.525: Primary | ICD-10-CM

## 2025-08-11 PROCEDURE — 11042 DBRDMT SUBQ TIS 1ST 20SQCM/<: CPT | Performed by: PODIATRIST

## 2025-08-11 PROCEDURE — G0463 HOSPITAL OUTPT CLINIC VISIT: HCPCS | Performed by: PODIATRIST

## 2025-08-11 ASSESSMENT — PAIN SCALES - GENERAL: PAINLEVEL_OUTOF10: NO PAIN (0)

## 2025-08-13 ENCOUNTER — TELEPHONE (OUTPATIENT)
Dept: FAMILY MEDICINE | Facility: CLINIC | Age: 65
End: 2025-08-13
Payer: MEDICARE

## 2025-08-19 ENCOUNTER — TELEPHONE (OUTPATIENT)
Dept: PODIATRY | Facility: CLINIC | Age: 65
End: 2025-08-19
Payer: MEDICARE

## 2025-08-25 ENCOUNTER — TELEPHONE (OUTPATIENT)
Dept: FAMILY MEDICINE | Facility: CLINIC | Age: 65
End: 2025-08-25
Payer: MEDICARE

## 2025-08-29 ENCOUNTER — TELEPHONE (OUTPATIENT)
Dept: FAMILY MEDICINE | Facility: CLINIC | Age: 65
End: 2025-08-29
Payer: MEDICARE

## 2025-09-02 DIAGNOSIS — Z53.9 DIAGNOSIS NOT YET DEFINED: Primary | ICD-10-CM

## 2025-09-03 ENCOUNTER — OFFICE VISIT (OUTPATIENT)
Dept: PHARMACY | Facility: CLINIC | Age: 65
End: 2025-09-03
Payer: MEDICARE

## 2025-09-03 VITALS
SYSTOLIC BLOOD PRESSURE: 103 MMHG | OXYGEN SATURATION: 99 % | BODY MASS INDEX: 28.87 KG/M2 | DIASTOLIC BLOOD PRESSURE: 68 MMHG | WEIGHT: 231 LBS | HEART RATE: 67 BPM

## 2025-09-03 DIAGNOSIS — E11.52 TYPE 2 DIABETES MELLITUS WITH DIABETIC PERIPHERAL ANGIOPATHY AND GANGRENE, WITH LONG-TERM CURRENT USE OF INSULIN (H): ICD-10-CM

## 2025-09-03 DIAGNOSIS — I48.92 ATRIAL FLUTTER, UNSPECIFIED TYPE (H): ICD-10-CM

## 2025-09-03 DIAGNOSIS — Z79.4 TYPE 2 DIABETES MELLITUS WITH DIABETIC PERIPHERAL ANGIOPATHY AND GANGRENE, WITH LONG-TERM CURRENT USE OF INSULIN (H): ICD-10-CM

## 2025-09-03 DIAGNOSIS — E78.5 HYPERLIPIDEMIA LDL GOAL <100: Primary | ICD-10-CM

## 2025-09-03 DIAGNOSIS — I10 BENIGN ESSENTIAL HYPERTENSION: ICD-10-CM

## 2025-09-03 PROCEDURE — 99207 PR NO CHARGE LOS: CPT | Performed by: PHARMACIST

## 2025-09-03 RX ORDER — ATORVASTATIN CALCIUM 80 MG/1
80 TABLET, FILM COATED ORAL DAILY
Qty: 90 TABLET | Refills: 0 | Status: SHIPPED | OUTPATIENT
Start: 2025-09-03

## 2025-09-03 RX ORDER — PEN NEEDLE, DIABETIC 31 GX3/16"
NEEDLE, DISPOSABLE MISCELLANEOUS
Qty: 400 EACH | Refills: 3 | Status: SHIPPED | OUTPATIENT
Start: 2025-09-03

## 2025-09-03 RX ORDER — BLOOD PRESSURE TEST KIT
4 KIT MISCELLANEOUS DAILY
Qty: 400 EACH | Refills: 3 | Status: SHIPPED | OUTPATIENT
Start: 2025-09-03

## (undated) DEVICE — SOLUTION IRRIGATION 0.9% NACL 1000ML R5200-01

## (undated) DEVICE — BAG CLEAR TRASH 1.3M 39X33" P4040C

## (undated) DEVICE — LINEN ORTHO ACL PACK 5447

## (undated) DEVICE — DRSG GAUZE 4X4" TRAY

## (undated) DEVICE — BLADE SAW SAGITTAL 25.5X9.5X.4MM FINE LINVATEC 5023-138

## (undated) DEVICE — GLOVE BIOGEL PI SZ 6.5 40865

## (undated) DEVICE — NDL 18GA 1.5" 305196

## (undated) DEVICE — SU VICRYL+ 3-0 PS2 27IN UND VCP427H

## (undated) DEVICE — CAST PADDING 4" STERILE 9044S

## (undated) DEVICE — BNDG ELASTIC 4"X5YDS UNSTERILE 6611-40

## (undated) DEVICE — SU PROLENE 3-0 PS-2 18" 8687H

## (undated) DEVICE — SU ETHILON 2-0 PS 18" 585H

## (undated) DEVICE — ESU GROUND PAD ADULT W/CORD E7507

## (undated) DEVICE — LINEN HALF SHEET 5512

## (undated) DEVICE — GLOVE PROTEXIS BLUE W/NEU-THERA 6.5  2D73EB65

## (undated) DEVICE — SYR 10ML FINGER CONTROL W/O NDL 309695

## (undated) DEVICE — PACK LOWER EXTREMITY RIDGES SOP32FAR14

## (undated) DEVICE — DRSG KERLIX 4 1/2"X4YDS ROLL 6730

## (undated) DEVICE — BLADE KNIFE SURG 15 371115

## (undated) DEVICE — DRSG GAUZE 4X4" 8044

## (undated) DEVICE — PREP POVIDONE IODINE SOLUTION 10% 4OZ BOTTLE 29906-004

## (undated) DEVICE — DRSG ABDOMINAL 07 1/2X8" 7197D

## (undated) DEVICE — TRAY PREP DRY SKIN SCRUB 067

## (undated) DEVICE — GLOVE BIOGEL PI MICRO SZ 6.5 48565

## (undated) DEVICE — NDL 25GA 1.5" 305127

## (undated) DEVICE — LINEN FULL SHEET 5511

## (undated) DEVICE — ESU ELEC BLADE 2.75" COATED/INSULATED E1455

## (undated) DEVICE — LINEN TOWEL PACK X10 5473

## (undated) DEVICE — PREP POVIDONE-IODINE 7.5% SCRUB 4OZ BOTTLE MDS093945

## (undated) DEVICE — GLOVE BIOGEL PI MICRO INDICATOR UNDERGLOVE SZ 7.0 48970

## (undated) DEVICE — GLOVE PROTEXIS W/NEU-THERA 6.5  2D73TE65

## (undated) RX ORDER — LIDOCAINE HYDROCHLORIDE 10 MG/ML
INJECTION, SOLUTION EPIDURAL; INFILTRATION; INTRACAUDAL; PERINEURAL
Status: DISPENSED
Start: 2025-04-11

## (undated) RX ORDER — PROPOFOL 10 MG/ML
INJECTION, EMULSION INTRAVENOUS
Status: DISPENSED
Start: 2025-04-17

## (undated) RX ORDER — PROPOFOL 10 MG/ML
INJECTION, EMULSION INTRAVENOUS
Status: DISPENSED
Start: 2025-04-11

## (undated) RX ORDER — BUPIVACAINE HYDROCHLORIDE 5 MG/ML
INJECTION, SOLUTION EPIDURAL; INTRACAUDAL; PERINEURAL
Status: DISPENSED
Start: 2025-04-17

## (undated) RX ORDER — LIDOCAINE HYDROCHLORIDE 10 MG/ML
INJECTION, SOLUTION EPIDURAL; INFILTRATION; INTRACAUDAL; PERINEURAL
Status: DISPENSED
Start: 2025-04-08

## (undated) RX ORDER — DEXAMETHASONE SODIUM PHOSPHATE 4 MG/ML
INJECTION, SOLUTION INTRA-ARTICULAR; INTRALESIONAL; INTRAMUSCULAR; INTRAVENOUS; SOFT TISSUE
Status: DISPENSED
Start: 2025-04-17

## (undated) RX ORDER — ONDANSETRON 2 MG/ML
INJECTION INTRAMUSCULAR; INTRAVENOUS
Status: DISPENSED
Start: 2025-04-17

## (undated) RX ORDER — FENTANYL CITRATE-0.9 % NACL/PF 10 MCG/ML
PLASTIC BAG, INJECTION (ML) INTRAVENOUS
Status: DISPENSED
Start: 2025-04-08

## (undated) RX ORDER — GLYCOPYRROLATE 0.2 MG/ML
INJECTION, SOLUTION INTRAMUSCULAR; INTRAVENOUS
Status: DISPENSED
Start: 2025-04-11

## (undated) RX ORDER — BUPIVACAINE HYDROCHLORIDE 5 MG/ML
INJECTION, SOLUTION EPIDURAL; INTRACAUDAL; PERINEURAL
Status: DISPENSED
Start: 2025-04-08

## (undated) RX ORDER — FENTANYL CITRATE-0.9 % NACL/PF 10 MCG/ML
PLASTIC BAG, INJECTION (ML) INTRAVENOUS
Status: DISPENSED
Start: 2025-04-11

## (undated) RX ORDER — GLYCOPYRROLATE 0.2 MG/ML
INJECTION, SOLUTION INTRAMUSCULAR; INTRAVENOUS
Status: DISPENSED
Start: 2025-04-17

## (undated) RX ORDER — ONDANSETRON 2 MG/ML
INJECTION INTRAMUSCULAR; INTRAVENOUS
Status: DISPENSED
Start: 2025-04-11

## (undated) RX ORDER — LIDOCAINE HYDROCHLORIDE 10 MG/ML
INJECTION, SOLUTION EPIDURAL; INFILTRATION; INTRACAUDAL; PERINEURAL
Status: DISPENSED
Start: 2025-04-17

## (undated) RX ORDER — BUPIVACAINE HYDROCHLORIDE 5 MG/ML
INJECTION, SOLUTION EPIDURAL; INTRACAUDAL; PERINEURAL
Status: DISPENSED
Start: 2025-04-11

## (undated) RX ORDER — ONDANSETRON 2 MG/ML
INJECTION INTRAMUSCULAR; INTRAVENOUS
Status: DISPENSED
Start: 2025-04-08

## (undated) RX ORDER — PROPOFOL 10 MG/ML
INJECTION, EMULSION INTRAVENOUS
Status: DISPENSED
Start: 2025-04-08